# Patient Record
Sex: MALE | Race: WHITE | Employment: OTHER | URBAN - METROPOLITAN AREA
[De-identification: names, ages, dates, MRNs, and addresses within clinical notes are randomized per-mention and may not be internally consistent; named-entity substitution may affect disease eponyms.]

---

## 2017-02-21 ENCOUNTER — ONCOLOGY VISIT (OUTPATIENT)
Dept: ONCOLOGY | Facility: CLINIC | Age: 72
End: 2017-02-21
Attending: INTERNAL MEDICINE
Payer: COMMERCIAL

## 2017-02-21 VITALS
TEMPERATURE: 98.6 F | HEART RATE: 79 BPM | SYSTOLIC BLOOD PRESSURE: 128 MMHG | BODY MASS INDEX: 24.75 KG/M2 | DIASTOLIC BLOOD PRESSURE: 67 MMHG | WEIGHT: 167.6 LBS

## 2017-02-21 DIAGNOSIS — R63.5 ABNORMAL WEIGHT GAIN: ICD-10-CM

## 2017-02-21 DIAGNOSIS — C34.11 MALIGNANT NEOPLASM OF UPPER LOBE OF RIGHT LUNG (H): Primary | ICD-10-CM

## 2017-02-21 DIAGNOSIS — D61.818 PANCYTOPENIA (H): ICD-10-CM

## 2017-02-21 DIAGNOSIS — C34.11 MALIGNANT NEOPLASM OF UPPER LOBE OF RIGHT LUNG (H): ICD-10-CM

## 2017-02-21 DIAGNOSIS — C34.90 MALIGNANT NEOPLASM OF LUNG, UNSPECIFIED LATERALITY, UNSPECIFIED PART OF LUNG (H): ICD-10-CM

## 2017-02-21 LAB
ALBUMIN SERPL-MCNC: 3.6 G/DL (ref 3.4–5)
ALP SERPL-CCNC: 57 U/L (ref 40–150)
ALT SERPL W P-5'-P-CCNC: 15 U/L (ref 0–70)
ANION GAP SERPL CALCULATED.3IONS-SCNC: 8 MMOL/L (ref 3–14)
AST SERPL W P-5'-P-CCNC: 8 U/L (ref 0–45)
BASOPHILS # BLD AUTO: 0 10E9/L (ref 0–0.2)
BASOPHILS NFR BLD AUTO: 0.5 %
BILIRUB SERPL-MCNC: 0.5 MG/DL (ref 0.2–1.3)
BUN SERPL-MCNC: 22 MG/DL (ref 7–30)
CALCIUM SERPL-MCNC: 8.6 MG/DL (ref 8.5–10.1)
CHLORIDE SERPL-SCNC: 108 MMOL/L (ref 94–109)
CO2 SERPL-SCNC: 25 MMOL/L (ref 20–32)
CREAT SERPL-MCNC: 1.16 MG/DL (ref 0.66–1.25)
DIFFERENTIAL METHOD BLD: ABNORMAL
EOSINOPHIL # BLD AUTO: 0.1 10E9/L (ref 0–0.7)
EOSINOPHIL NFR BLD AUTO: 2.4 %
ERYTHROCYTE [DISTWIDTH] IN BLOOD BY AUTOMATED COUNT: 16.1 % (ref 10–15)
GFR SERPL CREATININE-BSD FRML MDRD: 62 ML/MIN/1.7M2
GLUCOSE SERPL-MCNC: 102 MG/DL (ref 70–99)
HCT VFR BLD AUTO: 32.9 % (ref 40–53)
HGB BLD-MCNC: 10.8 G/DL (ref 13.3–17.7)
IMM GRANULOCYTES # BLD: 0.1 10E9/L (ref 0–0.4)
IMM GRANULOCYTES NFR BLD: 1.9 %
LYMPHOCYTES # BLD AUTO: 1.5 10E9/L (ref 0.8–5.3)
LYMPHOCYTES NFR BLD AUTO: 35.5 %
MCH RBC QN AUTO: 34.4 PG (ref 26.5–33)
MCHC RBC AUTO-ENTMCNC: 32.8 G/DL (ref 31.5–36.5)
MCV RBC AUTO: 105 FL (ref 78–100)
MONOCYTES # BLD AUTO: 0.4 10E9/L (ref 0–1.3)
MONOCYTES NFR BLD AUTO: 8.7 %
NEUTROPHILS # BLD AUTO: 2.2 10E9/L (ref 1.6–8.3)
NEUTROPHILS NFR BLD AUTO: 51 %
NRBC # BLD AUTO: 0 10*3/UL
NRBC BLD AUTO-RTO: 0 /100
PLATELET # BLD AUTO: 125 10E9/L (ref 150–450)
POTASSIUM SERPL-SCNC: 4.4 MMOL/L (ref 3.4–5.3)
PROT SERPL-MCNC: 6.7 G/DL (ref 6.8–8.8)
RBC # BLD AUTO: 3.14 10E12/L (ref 4.4–5.9)
SODIUM SERPL-SCNC: 141 MMOL/L (ref 133–144)
TSH SERPL DL<=0.005 MIU/L-ACNC: 3.54 MU/L (ref 0.4–4)
WBC # BLD AUTO: 4.3 10E9/L (ref 4–11)

## 2017-02-21 PROCEDURE — 80053 COMPREHEN METABOLIC PANEL: CPT | Performed by: INTERNAL MEDICINE

## 2017-02-21 PROCEDURE — 85025 COMPLETE CBC W/AUTO DIFF WBC: CPT | Performed by: INTERNAL MEDICINE

## 2017-02-21 PROCEDURE — 99214 OFFICE O/P EST MOD 30 MIN: CPT | Mod: ZP | Performed by: INTERNAL MEDICINE

## 2017-02-21 PROCEDURE — 99212 OFFICE O/P EST SF 10 MIN: CPT | Mod: ZF

## 2017-02-21 PROCEDURE — 84443 ASSAY THYROID STIM HORMONE: CPT | Performed by: INTERNAL MEDICINE

## 2017-02-21 PROCEDURE — 36415 COLL VENOUS BLD VENIPUNCTURE: CPT | Performed by: INTERNAL MEDICINE

## 2017-02-21 RX ORDER — ALBUTEROL SULFATE 90 UG/1
AEROSOL, METERED RESPIRATORY (INHALATION)
Refills: 3 | COMMUNITY
Start: 2017-01-24

## 2017-02-21 RX ORDER — MOMETASONE FUROATE AND FORMOTEROL FUMARATE DIHYDRATE 100; 5 UG/1; UG/1
AEROSOL RESPIRATORY (INHALATION)
Refills: 3 | COMMUNITY
Start: 2017-01-24

## 2017-02-21 ASSESSMENT — PAIN SCALES - GENERAL: PAINLEVEL: MILD PAIN (2)

## 2017-02-21 NOTE — MR AVS SNAPSHOT
After Visit Summary   2/21/2017    Stew Crooks    MRN: [id number]           Patient Information     Date Of Birth          1945        Visit Information        Provider Department      2/21/2017 3:15 PM Henry Graham DO; MULTILINGUAL WORD [facility]        Today's Diagnoses     Malignant neoplasm of upper lobe of right lung (H)    -  1    Pancytopenia (H)        Abnormal weight gain           Follow-ups after your visit        Follow-up notes from your care team     Return in about 6 months (around 8/21/2017).      Your next 10 appointments already scheduled     Aug 22, 2017  3:30 PM CDT   [facility] Lab Draw with  [facility] LAB DRAW   [facility] Lab Draw ([facility])    [address] [place] [id number] [phone]            Aug 22, 2017  4:00 PM CDT   (Arrive by 3:45 PM)   Return Visit with Henry Graham DO   [facility] ([facility])    [address] [place] [id number] [phone]              Who to contact     If you have questions or need follow up information about today's clinic visit or your schedule please contact [facility] directly at [phone].  Normal or non-critical lab and imaging results will be communicated to you by MyChart, letter or phone within 4 business days after the clinic has received the results. If you do not hear from us within 7 days, please contact the clinic through MyChart or phone. If you have a critical or abnormal lab result, we will notify you by phone as soon as possible.  Submit refill requests through [employer] or call your pharmacy and they will forward the refill request to us. Please allow 3 business days for your refill to be completed.          Additional Information About Your Visit        [employer] Information     [employer] gives you secure access to your electronic health  record. If you see a primary care provider, you can also send messages to your care team and make appointments. If you have questions, please call your primary care clinic.  If you do not have a primary care provider, please call [phone] and they will assist you.        Care EveryWhere ID     This is your Care EveryWhere ID. This could be used by other organizations to access your [facility] medical records  [id number]        Your Vitals Were     Pulse Temperature BMI (Body Mass Index)             79 98.6  F (37  C) (Oral) 24.75 kg/m2          Blood Pressure from Last 3 Encounters:   02/21/17 128/67   08/23/16 120/70   05/31/16 138/74    Weight from Last 3 Encounters:   02/21/17 76 kg (167 lb 9.6 oz)   08/23/16 72.9 kg (160 lb 11.2 oz)   05/31/16 71.1 kg (156 lb 11.2 oz)               Primary Care Provider Office Phone # Fax #    Chavezcel Mckay Flores [phone]       [address] [facility] [address]  [facility] [address]        Thank you!     Thank you for choosing [facility]  for your care. Our goal is always to provide you with excellent care. Hearing back from our patients is one way we can continue to improve our services. Please take a few minutes to complete the written survey that you may receive in the mail after your visit with us. Thank you!             Your Updated Medication List - Protect others around you: Learn how to safely use, store and throw away your medicines at www.disposemymeds.org.          This list is accurate as of: 2/21/17 11:59 PM.  Always use your most recent med list.                   Brand Name Dispense Instructions for use    albuterol 108 (90 BASE) MCG/ACT Inhaler   Generic drug:  albuterol      INHALE 2 PUFFS BY MOUTH EVERY 4 HOURS AS NEEDED       DULERA 100-5 MCG/ACT oral inhaler   Generic drug:  mometasone-formoterol      INHALE 2 PUFFS BY MOUTH TWICE DAILY       TYLENOL PO      Take 500 mg by mouth

## 2017-02-21 NOTE — LETTER
2/21/2017       RE: Stew Crooks  [address]  [facility] [address]     Dear Colleague,    Thank you for referring your patient, Stew Crooks, to the [facility]. Please see a copy of my visit note below.    REASON FOR VISIT: f/u Lung ca    CANCER STAGE: Stage IIIB      HISTORY OF PRESENT ILLNESS:  71-year-old non-English speaking [place]ian Male  -In [facility] was diagnosed with herpes zoster in 10/2013. Ever since that point he has continued to have a burning sensation and pain in his R arm which never improved. In [facility] he underwent MRI for further evaluation of a thoracic origin and was diagnosed with a R pancoast tumor. He then underwent a CT CAP which found a 5-cm mass in the right apex invading the T2-T3 vertebral bodies and probably ribs 2 and 3. This mass is abutting subclavian artery with no david invasion. He was told not much can be done for him so the patient and his wife traveled to [place] for further care.   - 3/31/2014 EUS, EBUS with FNA revealed adeno-squamous carcinoma. The 4R node was positive but not 4L or 7.   - EGFR mutations were negative  -MRI brain was negative  - 04/07/14 PET showed in addition to main mass, a hypermetabolic precarinal nodes which likely describes stations 4L and 4R, both enlarged with hypermetabolism with the addition of a nodular thickening along the anterior pleura measuring 1.3 x 0.8 cm with a max SUV of 7.1. There is no evidence of disease outside the chest.   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  - 6/2014 completed chemoradiation with cisplatin/Etoposide (6000 cGy in 30 fractions).  - 6/24/14 RIJ DVT associated with his port.   - Restaging scans showed a good response to treatment    - In Dec 2015 was noted to have new pancytopenia. Lab workup negative (hep B, C, HIV, CMV, TSH, Iron studies, ferritin, folic acid, Vitamin B12, retic, and blood smear). He had a bone marrow biopsy in Jan  2016. 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 Given 5 days of IVIG 2/8 to 2/12.        Interim history: Mr. Crooks is here with his daughter and professional [place]  for six month f/u. He denies any changes in his health over that period. No f/c, cough, or cp/sob.     His daughter notes a 10-lb weight gain and is concerned that he is eating too much sugar. He is not very active though reports he can walk over a mile without difficulty.     This morning he noted some lower back pain. A few years ago he maybe had some pain in that area after bending over to lift a heavy box but this pain is new. No LE symptoms or urinary/bowel incontinence.     He passed the citizenship test and will take the oath of citizenship in the next 1-2 months. Has not been back to [facility] recently, his wife is there so he looks forward to visiting her soon. He is waiting until he becomes a citizen.     Review Of Systems  10-point review of systems were negative except as noted in HPI.    EXAM:  Blood pressure 128/67, pulse 79, temperature 98.6  F (37  C), temperature source Oral, weight 76 kg (167 lb 9.6 oz).  GEN: alert and oriented, nad  HEENT: perrla, sclera anicteric, oral mucosa moist without thrush  NECK: supple, no palpable LAD  HT: reg rate and rhythm, no murmurs  LUNGS: clear to auscultation bilaterally  ABD: soft, nt, nd, +bs   EXT: no clubbing, cyanosis, or edema  NEURO: CN 2-12 grossly intact, MS 5/5 b/l  SKIN: no rashes or lesions    Labs:  CBC RESULTS:   Recent Labs   Lab Test  02/21/17   1725   WBC  4.3   RBC  3.14*   HGB  10.8*   HCT  32.9*   MCV  105*   MCH  34.4*   MCHC  32.8   RDW  16.1*   PLT  125*     Cr 1.4    CT CAP:  2/21/17  IMPRESSION:   In this patient with history of lung cancer, status post  chemoradiation therapy:  1. No significant change in size of right apical lung mass,  associated  erosive changes of the adjacent vertebral bodies, compared to  8/19/2016, although this appears significantly smaller than 2014 and  2015, representing treatment response.   2. Fibrotic changes in the upper right lung, suggestive of  radiation-induced changes. Stable circumferential soft tissue  thickening surrounding low trachea.  3. 5 mm subpleural right apical nodule, unchanged since at least 2014.  4. Patchy hyperdensities involving T5 and T7, slightly less dense  compared to prior exam. Attention on follow-up. No new osseous lesion  to suggest metastasis.     Assessment/Plan  Stage III-B (T4N3M0) pancoast adeno-squamous carcinoma of the R lung.  Scan today shows stable right apical primary lesion and a small RLL nodule (5mm), also stable. He has no signs or symptoms of progressive disease. He is now over 2.5 years out from treatment with chemorads. He has stable residual disease and no indication for treatment. Continue with q6 month clinic visits, move to yearly surveillance imaging.   He is not sure if [place] will be in [place] in [place] (may be in [facility]), but if he is here encouraged him to follow-up with us. His daughter asked for today's scan on a disc and they will go down to radiology to get this, in case he sees a physician in [facility].     Pancytopenia - counts today are improved compared to six months ago. The BM bx in Jan 2016 was suspicious for MDS, especially given his h/o chemoradiation two years earlier. He also had a positive parvovirus PCR and was given IVIG in Feb 2016. He remains mildly macrocytic with previously normal RBC folate, B12, and copper levels. Repeat CBC in six months.       RTC in 6 months or when he is back from [facility] to see Dr. Graham with labs      Seen and discussed with staff Dr. Santos Shaikh MD  Heme/Onc Fellow  Pager: [phone]    I saw and examined the patient with Dr. Shaikh and agree with his note.  Stew is doing well overall.  He has no evidence of  recurrent lung cancer. We discussed several issues that are not likely related to lung cancer and though we did discuss checking a TSH for weight gain, I did refer him to primary care for the other complaints.  We will see him back in approx. 6 months.     Henry Graham   of Medicine  Division of Hematology, Oncology, and Transplantation

## 2017-02-21 NOTE — PROGRESS NOTES
REASON FOR VISIT: f/u Lung ca    CANCER STAGE: Stage IIIB      HISTORY OF PRESENT ILLNESS:  71-year-old non-English speaking Thai Male  -In [facility] was diagnosed with herpes zoster in 10/2013. Ever since that point he has continued to have a burning sensation and pain in his R arm which never improved. In [facility] he underwent MRI for further evaluation of a thoracic origin and was diagnosed with a R pancoast tumor. He then underwent a CT CAP which found a 5-cm mass in the right apex invading the T2-T3 vertebral bodies and probably ribs 2 and 3. This mass is abutting subclavian artery with no david invasion. He was told not much can be done for him so the patient and his wife traveled to [place] for further care.   - 3/31/2014 EUS, EBUS with FNA revealed adeno-squamous carcinoma. The 4R node was positive but not 4L or 7.   - EGFR mutations were negative  -MRI brain was negative  - 04/07/14 PET showed in addition to main mass, a hypermetabolic precarinal nodes which likely describes stations 4L and 4R, both enlarged with hypermetabolism with the addition of a nodular thickening along the anterior pleura measuring 1.3 x 0.8 cm with a max SUV of 7.1. There is no evidence of disease outside the chest.   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  - 6/2014 completed chemoradiation with cisplatin/Etoposide (6000 cGy in 30 fractions).  - 6/24/14 RIJ DVT associated with his port.   - Restaging scans showed a good response to treatment    - In Dec 2015 was noted to have new pancytopenia. Lab workup negative (hep B, C, HIV, CMV, TSH, Iron studies, ferritin, folic acid, Vitamin B12, retic, and blood smear). He had a bone marrow biopsy in Jan 2016. 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 Given 5 days of IVIG 2/8 to 2/12.        Interim history: Mr. Crooks is here  with his daughter and professional [facility]  for six month f/u. He denies any changes in his health over that period. No f/c, cough, or cp/sob.     His daughter notes a 10-lb weight gain and is concerned that he is eating too much sugar. He is not very active though reports he can walk over a mile without difficulty.     This morning he noted some lower back pain. A few years ago he maybe had some pain in that area after bending over to lift a heavy box but this pain is new. No LE symptoms or urinary/bowel incontinence.     He passed the citizenship test and will take the oath of citizenship in the next 1-2 months. Has not been back to [facility] recently, his wife is there so he looks forward to visiting her soon. He is waiting until he becomes a citizen.     Review Of Systems  10-point review of systems were negative except as noted in HPI.    EXAM:  Blood pressure 128/67, pulse 79, temperature 98.6  F (37  C), temperature source Oral, weight 76 kg (167 lb 9.6 oz).  GEN: alert and oriented, nad  HEENT: perrla, sclera anicteric, oral mucosa moist without thrush  NECK: supple, no palpable LAD  HT: reg rate and rhythm, no murmurs  LUNGS: clear to auscultation bilaterally  ABD: soft, nt, nd, +bs   EXT: no clubbing, cyanosis, or edema  NEURO: CN 2-12 grossly intact, MS 5/5 b/l  SKIN: no rashes or lesions    Labs:  CBC RESULTS:   Recent Labs   Lab Test  02/21/17   1725   WBC  4.3   RBC  3.14*   HGB  10.8*   HCT  32.9*   MCV  105*   MCH  34.4*   MCHC  32.8   RDW  16.1*   PLT  125*     Cr 1.4    CT CAP:  2/21/17  IMPRESSION:   In this patient with history of lung cancer, status post  chemoradiation therapy:  1. No significant change in size of right apical lung mass, associated  erosive changes of the adjacent vertebral bodies, compared to  8/19/2016, although this appears significantly smaller than 2014 and  2015, representing treatment response.   2. Fibrotic changes in the upper right lung, suggestive  of  radiation-induced changes. Stable circumferential soft tissue  thickening surrounding low trachea.  3. 5 mm subpleural right apical nodule, unchanged since at least 2014.  4. Patchy hyperdensities involving T5 and T7, slightly less dense  compared to prior exam. Attention on follow-up. No new osseous lesion  to suggest metastasis.     Assessment/Plan  Stage III-B (T4N3M0) pancoast adeno-squamous carcinoma of the R lung.  Scan today shows stable right apical primary lesion and a small RLL nodule (5mm), also stable. He has no signs or symptoms of progressive disease. He is now over 2.5 years out from treatment with chemorads. He has stable residual disease and no indication for treatment. Continue with q6 month clinic visits, move to yearly surveillance imaging.   He is not sure if [place] will be in [place] in [place] (may be in [facility]), but if he is here encouraged him to follow-up with us. His daughter asked for today's scan on a disc and they will go down to radiology to get this, in case he sees a physician in [facility].     Pancytopenia - counts today are improved compared to six months ago. The BM bx in Jan 2016 was suspicious for MDS, especially given his h/o chemoradiation two years earlier. He also had a positive parvovirus PCR and was given IVIG in Feb 2016. He remains mildly macrocytic with previously normal RBC folate, B12, and copper levels. Repeat CBC in six months.       RTC in 6 months or when he is back from [facility] to see Dr. Graham with labs      Seen and discussed with staff Dr. Santos Shaikh MD  Heme/Onc Fellow  Pager: [phone]    I saw and examined the patient with Dr. Shaikh and agree with his note.  Stew is doing well overall.  He has no evidence of recurrent lung cancer. We discussed several issues that are not likely related to lung cancer and though we did discuss checking a TSH for weight gain, I did refer him to primary care for the other complaints.  We will see him back in approx.  6 months.     Henry Graham   of Medicine  Division of Hematology, Oncology, and Transplantation

## 2017-02-21 NOTE — NURSING NOTE
"Stew Crooks is a 71 year old male who presents for:  Chief Complaint   Patient presents with     Oncology Clinic Visit     Lung CA        Initial Vitals:  /67 (BP Location: Right arm, Patient Position: Chair, Cuff Size: Adult Regular)  Pulse 79  Temp 98.6  F (37  C) (Oral)  Wt 76 kg (167 lb 9.6 oz)  BMI 24.75 kg/m2 Estimated body mass index is 24.75 kg/(m^2) as calculated from the following:    Height as of 8/23/16: 1.753 m (5' 9\").    Weight as of this encounter: 76 kg (167 lb 9.6 oz).. Body surface area is 1.92 meters squared. BP completed using cuff size: regular  Mild Pain (2) No LMP for male patient. Allergies and medications reviewed.     Medications: Medication refills not needed today.  Pharmacy name entered into [employer]:    [facility] [address] [place], [place] - [address] AT [facility]  [id number] [address]    Comments:     6 minutes for nursing intake (face to face time)   Saige Eugene CMA        "

## 2018-01-16 ENCOUNTER — RADIANT APPOINTMENT (OUTPATIENT)
Dept: CT IMAGING | Facility: CLINIC | Age: 73
End: 2018-01-16
Attending: INTERNAL MEDICINE
Payer: COMMERCIAL

## 2018-01-16 ENCOUNTER — ONCOLOGY VISIT (OUTPATIENT)
Dept: ONCOLOGY | Facility: CLINIC | Age: 73
End: 2018-01-16
Attending: INTERNAL MEDICINE
Payer: COMMERCIAL

## 2018-01-16 VITALS
WEIGHT: 161.4 LBS | DIASTOLIC BLOOD PRESSURE: 87 MMHG | HEIGHT: 69 IN | BODY MASS INDEX: 23.91 KG/M2 | SYSTOLIC BLOOD PRESSURE: 150 MMHG | RESPIRATION RATE: 16 BRPM | OXYGEN SATURATION: 93 % | HEART RATE: 85 BPM | TEMPERATURE: 98.6 F

## 2018-01-16 DIAGNOSIS — C34.11 MALIGNANT NEOPLASM OF UPPER LOBE OF RIGHT LUNG (H): ICD-10-CM

## 2018-01-16 DIAGNOSIS — C34.90 MALIGNANT NEOPLASM OF LUNG, UNSPECIFIED LATERALITY, UNSPECIFIED PART OF LUNG (H): ICD-10-CM

## 2018-01-16 DIAGNOSIS — C34.11 MALIGNANT NEOPLASM OF UPPER LOBE OF RIGHT LUNG (H): Primary | ICD-10-CM

## 2018-01-16 LAB
ALBUMIN SERPL-MCNC: 3.7 G/DL (ref 3.4–5)
ALP SERPL-CCNC: 58 U/L (ref 40–150)
ALT SERPL W P-5'-P-CCNC: 15 U/L (ref 0–70)
ANION GAP SERPL CALCULATED.3IONS-SCNC: 7 MMOL/L (ref 3–14)
AST SERPL W P-5'-P-CCNC: 12 U/L (ref 0–45)
BASOPHILS # BLD AUTO: 0.1 10E9/L (ref 0–0.2)
BASOPHILS NFR BLD AUTO: 1.7 %
BILIRUB SERPL-MCNC: 0.7 MG/DL (ref 0.2–1.3)
BUN SERPL-MCNC: 30 MG/DL (ref 7–30)
CALCIUM SERPL-MCNC: 8.5 MG/DL (ref 8.5–10.1)
CHLORIDE SERPL-SCNC: 107 MMOL/L (ref 94–109)
CO2 SERPL-SCNC: 25 MMOL/L (ref 20–32)
CREAT SERPL-MCNC: 1.23 MG/DL (ref 0.66–1.25)
DIFFERENTIAL METHOD BLD: ABNORMAL
EOSINOPHIL # BLD AUTO: 0.2 10E9/L (ref 0–0.7)
EOSINOPHIL NFR BLD AUTO: 5.7 %
ERYTHROCYTE [DISTWIDTH] IN BLOOD BY AUTOMATED COUNT: 17.2 % (ref 10–15)
GFR SERPL CREATININE-BSD FRML MDRD: 58 ML/MIN/1.7M2
GLUCOSE SERPL-MCNC: 101 MG/DL (ref 70–99)
HCT VFR BLD AUTO: 32.8 % (ref 40–53)
HGB BLD-MCNC: 10.6 G/DL (ref 13.3–17.7)
IMM GRANULOCYTES # BLD: 0 10E9/L (ref 0–0.4)
IMM GRANULOCYTES NFR BLD: 0 %
LYMPHOCYTES # BLD AUTO: 1.3 10E9/L (ref 0.8–5.3)
LYMPHOCYTES NFR BLD AUTO: 30 %
MCH RBC QN AUTO: 35.5 PG (ref 26.5–33)
MCHC RBC AUTO-ENTMCNC: 32.3 G/DL (ref 31.5–36.5)
MCV RBC AUTO: 110 FL (ref 78–100)
MONOCYTES # BLD AUTO: 0.3 10E9/L (ref 0–1.3)
MONOCYTES NFR BLD AUTO: 6.1 %
NEUTROPHILS # BLD AUTO: 2.4 10E9/L (ref 1.6–8.3)
NEUTROPHILS NFR BLD AUTO: 56.5 %
NRBC # BLD AUTO: 0 10*3/UL
NRBC BLD AUTO-RTO: 0 /100
PLATELET # BLD AUTO: 141 10E9/L (ref 150–450)
POTASSIUM SERPL-SCNC: 4.1 MMOL/L (ref 3.4–5.3)
PROT SERPL-MCNC: 6.8 G/DL (ref 6.8–8.8)
RBC # BLD AUTO: 2.99 10E12/L (ref 4.4–5.9)
SODIUM SERPL-SCNC: 139 MMOL/L (ref 133–144)
WBC # BLD AUTO: 4.2 10E9/L (ref 4–11)

## 2018-01-16 PROCEDURE — 99214 OFFICE O/P EST MOD 30 MIN: CPT | Mod: ZP | Performed by: INTERNAL MEDICINE

## 2018-01-16 PROCEDURE — 80053 COMPREHEN METABOLIC PANEL: CPT | Performed by: INTERNAL MEDICINE

## 2018-01-16 PROCEDURE — G0463 HOSPITAL OUTPT CLINIC VISIT: HCPCS | Mod: ZF

## 2018-01-16 PROCEDURE — 85025 COMPLETE CBC W/AUTO DIFF WBC: CPT | Performed by: INTERNAL MEDICINE

## 2018-01-16 ASSESSMENT — PAIN SCALES - GENERAL: PAINLEVEL: NO PAIN (0)

## 2018-01-16 NOTE — LETTER
1/16/2018       RE: Stew Crooks  [address]  [facility] [address]     Dear Colleague,    Thank you for referring your patient, Stew Crooks, to the [facility]. Please see a copy of my visit note below.    REASON FOR VISIT:    CANCER STAGE: No matching staging information was found for the patient.      HISTORY OF PRESENT ILLNESS:  71-year-old non-English speaking Hungarian Male  -In [facility] was diagnosed with herpes zoster in 10/2013. Ever since that point he has continued to have a burning sensation and pain in his R arm which never improved. In [facility] he underwent MRI for further evaluation of a thoracic origin and was diagnosed with a R pancoast tumor. He then underwent a CT CAP which found a 5-cm mass in the right apex invading the T2-T3 vertebral bodies and probably ribs 2 and 3. This mass is abutting subclavian artery with no david invasion. He was told not much can be done for him so the patient and his wife traveled to [place] for further care.   - 3/31/2014 EUS, EBUS with FNA revealed adeno-squamous carcinoma. The 4R node was positive but not 4L or 7.   - EGFR mutations were negative  -MRI brain was negative  - 04/07/14 PET showed in addition to main mass, a hypermetabolic precarinal nodes which likely describes stations 4L and 4R, both enlarged with hypermetabolism with the addition of a nodular thickening along the anterior pleura measuring 1.3 x 0.8 cm with a max SUV of 7.1. There is no evidence of disease outside the chest.   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  - 6/2014 completed chemoradiation with cisplatin/Etoposide (6000 cGy in 30 fractions).  - 6/24/14 RIJ DVT associated with his port.   - Restaging scans showed a good response to treatment     - In Dec 2015 was noted to have new pancytopenia. Lab workup negative (hep B, C, HIV, CMV, TSH, Iron studies, ferritin, folic acid, Vitamin B12, retic, and blood smear).  "He had a bone marrow biopsy in Jan 2016. 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 Given 5 days of IVIG 2/8 to 2/12.      Stew returns in follow up after one year.  He is now 3.5 years out from completion of chemorads.  He is doing well. He was in [facility] last summer and had a nice trip there.  He is really without significant complaints right now.  He is not having any shortness of breath. He eats well and is not losing weight.  His back pain is pretty stable and not really an issue right now.  His daughter brought up that he has had some issues with his prostate but refuses to go get it rechecked.  He did take some \"Prostamol Ed\" in [facility] for his prostate, but it did not help.  He gets up a couple times a night to go to the bathroom and is not particularly bothered by this.  He otherwise has no other complaints.     Review Of Systems  10-point review of systems were negative except as noted in HPI.        EXAM:  B/P: 150/87, T: 98.6, P: 85, R: 16  GEN: alert and oriented x 3, nad  HEENT: perrla, eomi, sclera anicteric, oral mucosa moist without thrush  NECK: supple, no palpable LAD  HT: reg rate and rhythm, no murmurs  LUNGS: clear to auscultation bilaterally  ABD: soft, nt, nd, +bs x 4  EXT: no clubbing, cyanosis, or edema  NEURO: CN 2-12 intact, MS 5/5 b/l    Current Outpatient Prescriptions   Medication Sig Dispense Refill     DULERA 100-5 MCG/ACT oral inhaler INHALE 2 PUFFS BY MOUTH TWICE DAILY  3     ALBUTEROL 108 (90 BASE) MCG/ACT inhaler INHALE 2 PUFFS BY MOUTH EVERY 4 HOURS AS NEEDED  3     Acetaminophen (TYLENOL PO) Take 500 mg by mouth             Recent Labs   Lab Test  02/21/17   1725   WBC  4.3   HGB  10.8*   PLT  125*     @labrcent[na,potassium,chloride,co2,bun,cr@  Recent Labs   Lab Test  02/21/17   1725   PROTTOTAL  6.7*   ALBUMIN  3.6   BILITOTAL  0.5   AST  8   ALT  15   ALKPHOS  " 57         Results for orders placed or performed in visit on 02/21/17   CT Chest w Contrast    Narrative    EXAMINATION: CT CHEST W CONTRAST, 2/21/2017 1:28 PM    TECHNIQUE:  Helical CT images from the lung apices through the upper  abdomen were obtained with IV contrast.  Contrast: 79 mL Isovue-370    COMPARISON: CT 8/19/2016, CT 5/22/2015    HISTORY: Malignant neoplasm of upper lobe, right bronchus or lung,  history of adenosquamous lung cancer, Pancoast tumor, status post  chemoradiation therapy completed in 2014.    FINDINGS:    Chest: Partially visualized thyroid is unremarkable. Postsurgical  changes or mediastinal lymphadenectomy. Stable circumferential soft  tissue thickening and obscuration of fat plane of the distal trachea.  7 mm aortopulmonary prominent lymph node, unchanged. No  lymphadenopathy in the chest by size criteria. Normal heart size. Tiny  anterior pericardial effusion, decreased compared to prior exam.  Normal caliber of the thoracic aorta and pulmonary vasculature.    No pleural effusion or pneumothorax. No central pulmonary emboli.    Lungs: Trachea and central airways are patent. Mild centrilobular  emphysematous changes, unchanged. Mosaic attenuation of the right  middle lobe with large central bulla formation on series 4, image 157,  unchanged.    No significant change in right apical lung mass measuring 2.6 x 1.8 cm  in axial dimension, with no new adjacent nodule or soft tissue  thickening. Interstitial fibrotic changes in the upper right lung is  unchanged, representing radiation-induced fibrotic changes.    Unchanged 5 mm subpleural right apical nodule, on series 4, image 57,  unchanged since at least 6/24/2014. No new pulmonary nodule. No focal  pulmonary consolidation.    Upper abdomen: Partially visualized kidneys demonstrate homogeneous  enhancement. No hydronephrosis. Bladder is contracted. Tiny  hypodensity in the left lobe of the liver, subcapsular region, too  small to  characterize, unchanged. Limited evaluation of the upper  abdomen is otherwise unremarkable.    Bones and soft tissues: No significant change in erosive changes of T1  and T2 vertebral bodies adjacent to the right apical lung mass. There  is slightly decreased attenuation of sclerotic lesions involving the  T5 and T7 vertebral body compared to prior exam, although not  visualized on prior exam 5/22/2015. Minimal anterior compression  fracture deformity of L1, unchanged. No new suspicious osseous lesion.  No abnormal soft tissue density.      Impression    IMPRESSION:   In this patient with history of lung cancer, status post  chemoradiation therapy:  1. No significant change in size of right apical lung mass, associated  erosive changes of the adjacent vertebral bodies, compared to  8/19/2016, although this appears significantly smaller than 2014 and  2015, representing treatment response.   2. Fibrotic changes in the upper right lung, suggestive of  radiation-induced changes. Stable circumferential soft tissue  thickening surrounding low trachea.  3. 5 mm subpleural right apical nodule, unchanged since at least 2014.  4. Patchy hyperdensities involving T5 and T7, slightly less dense  compared to prior exam. Attention on follow-up. No new osseous lesion  to suggest metastasis.    I have personally reviewed the examination and initial interpretation  and I agree with the findings.    JONN OJEDA MD           Assessment/Plan  Stage IIIa NSCLC - He is now several years out from completion of his definitive therapy.  His scan today looks good and there are no concerning lesions.  He plans to go back to [facility] this summer so we will see him back in one year with repeat CT chest.      HTN - his BP is a little on the high side today.  I suggested that he get his BP checked again while at home or in the pharmacy periodically.  I counseled them that I would not treat based on one high BP and he should follow up with his  PCP.     Cr of 1.2 - We discussed that this is a little higher than we would expect.  I asked him to push some fluids and get it rechecked in a little while.  If it is still elevated, he may need some additional w/u, but if it normalizes, he just needs to focus on getting good fluid intake.     I spent 30 minutes with the patient.  >50% of the time was spent in counseling and coordination of care.    Henry Graham   of Medicine  Division of Hematology, Oncology, and Transplantation

## 2018-01-16 NOTE — MR AVS SNAPSHOT
After Visit Summary   1/16/2018    Stew Crooks    MRN: [id number]           Patient Information     Date Of Birth          1945        Visit Information        Provider Department      1/16/2018 5:45 PM Henry Graham DO; MULTILINGUAL WORD [facility]        Today's Diagnoses     Malignant neoplasm of upper lobe of right lung (H)    -  1       Follow-ups after your visit        Your next 10 appointments already scheduled     Jan 11, 2019 10:45 AM CST   LAB with  LAB   [facility] ([facility])    [address] [place] [id number] [phone]           Please do not eat 10-12 hours before your appointment if you are coming in fasting for labs on lipids, cholesterol, or glucose (sugar). This does not apply to pregnant women. Water, hot tea and black coffee (with nothing added) are okay. Do not drink other fluids, diet soda or chew gum.            Jan 11, 2019 11:20 AM CST   (Arrive by 11:05 AM)   CT CHEST W/O CONTRAST with UCCT1   [facility] CT ([facility])    [address] [place] [id number] [phone]           Please bring any scans or X-rays taken at other hospitals, if similar tests were done. Also bring a list of your medicines, including vitamins, minerals and over-the-counter drugs. It is safest to leave personal items at home.  Be sure to tell your doctor:   If you have any allergies.   If there s any chance you are pregnant.   If you are breastfeeding.   If you have any special needs.  You do not need to do anything special to prepare.  Please wear loose clothing, such as a sweat suit or jogging clothes. Avoid snaps, zippers and other metal. We may ask you to undress and put on a hospital gown.            Mamadou 15, 2019  3:00 PM CST   (Arrive by 2:45 PM)   Return Visit with Henry Graham DO   [facility] ([facility]  "Clinics and Surgery Center)    [address]  [address]  [facility] [address] [phone]              Future tests that were ordered for you today     Open Future Orders        Priority Expected Expires Ordered    CT Chest w/o Contrast Routine 1/16/2019 1/16/2019 1/16/2018    Comprehensive metabolic panel Routine 1/16/2019 1/16/2019 1/16/2018    CBC with platelets differential Routine 1/16/2019 1/16/2019 1/16/2018            Who to contact     If you have questions or need follow up information about today's clinic visit or your schedule please contact [facility] directly at [phone].  Normal or non-critical lab and imaging results will be communicated to you by NetVisionhart, letter or phone within 4 business days after the clinic has received the results. If you do not hear from us within 7 days, please contact the clinic through [employer] or phone. If you have a critical or abnormal lab result, we will notify you by phone as soon as possible.  Submit refill requests through [employer] or call your pharmacy and they will forward the refill request to us. Please allow 3 business days for your refill to be completed.          Additional Information About Your Visit        Net[employer]ision[employer]Class[employer] Information     [employer] gives you secure access to your electronic health record. If you see a primary care provider, you can also send messages to your care team and make appointments. If you have questions, please call your primary care clinic.  If you do not have a primary care provider, please call [phone] and they will assist you.        Care EveryWhere ID     This is your Care EveryWhere ID. This could be used by other organizations to access your [facility] medical records  [id number]        Your Vitals Were     Pulse Temperature Respirations Height Pulse Oximetry BMI (Body Mass Index)    85 98.6  F (37  C) (Oral) 16 1.753 m (5' 9\") 93% 23.83 kg/m2       Blood Pressure from Last 3 Encounters: "   01/16/18 150/87   02/21/17 128/67   08/23/16 120/70    Weight from Last 3 Encounters:   01/16/18 73.2 kg (161 lb 6.4 oz)   02/21/17 76 kg (167 lb 9.6 oz)   08/23/16 72.9 kg (160 lb 11.2 oz)               Primary Care Provider Office Phone # Fax #    Rani Mckay Flores [phone]       [facility] [address]  [facility] [address]        Equal Access to Services     [facility]: Hadii aad ku hadasho Soomaali, waaxda luqadaha, qaybta kaalmada adeegyada, florida garcia . So [facility] [phone].    ATENCIÓN: Si habla español, tiene a huynh disposición servicios gratuitos de asistencia lingüística. [facility] [phone].    We comply with applicable federal civil rights laws and [place] laws. We do not discriminate on the basis of race, color, national origin, age, disability, sex, sexual orientation, or gender identity.            Thank you!     Thank you for choosing [facility]  for your care. Our goal is always to provide you with excellent care. Hearing back from our patients is one way we can continue to improve our services. Please take a few minutes to complete the written survey that you may receive in the mail after your visit with us. Thank you!             Your Updated Medication List - Protect others around you: Learn how to safely use, store and throw away your medicines at www.disposemymeds.org.          This list is accurate as of: 1/16/18 11:59 PM.  Always use your most recent med list.                   Brand Name Dispense Instructions for use Diagnosis    DULERA 100-5 MCG/ACT oral inhaler   Generic drug:  mometasone-formoterol      INHALE 2 PUFFS BY MOUTH TWICE DAILY        PROAIR  (90 BASE) MCG/ACT Inhaler   Generic drug:  albuterol      INHALE 2 PUFFS BY MOUTH EVERY 4 HOURS AS NEEDED        TYLENOL PO      Take 500 mg by mouth

## 2018-01-16 NOTE — NURSING NOTE
"Oncology Rooming Note    January 16, 2018 5:40 PM   Stew Crooks is a 72 year old male who presents for:    Chief Complaint   Patient presents with     Oncology Clinic Visit     LUNG CA     Initial Vitals: There were no vitals taken for this visit. Estimated body mass index is 24.75 kg/(m^2) as calculated from the following:    Height as of 8/23/16: 1.753 m (5' 9\").    Weight as of 2/21/17: 76 kg (167 lb 9.6 oz). There is no height or weight on file to calculate BSA.  Data Unavailable Comment: Data Unavailable   No LMP for male patient.  Allergies reviewed: Yes  Medications reviewed: Yes    Medications: Medication refills not needed today.  Pharmacy name entered into [employer]:    [facility] [id number] [facility] [address] AT [id number] [address] AT [address]    Preventive Care:    Colorectal Cancer Screening: During our visit today, we discussed that it is recommended you receive colorectal cancer screening. Please call or make an appointment with your primary care provider to discuss this. You may also call the  [employer] scheduling line ([phone]) to set up a colonoscopy appointment.    5 minutes for nursing intake (face to face time)     MAX GAN CMA              "

## 2018-01-16 NOTE — PROGRESS NOTES
REASON FOR VISIT:    CANCER STAGE: No matching staging information was found for the patient.      HISTORY OF PRESENT ILLNESS:  71-year-old non-English speaking Luxembourgish Male  -In [facility] was diagnosed with herpes zoster in 10/2013. Ever since that point he has continued to have a burning sensation and pain in his R arm which never improved. In [facility] he underwent MRI for further evaluation of a thoracic origin and was diagnosed with a R pancoast tumor. He then underwent a CT CAP which found a 5-cm mass in the right apex invading the T2-T3 vertebral bodies and probably ribs 2 and 3. This mass is abutting subclavian artery with no david invasion. He was told not much can be done for him so the patient and his wife traveled to [place] for further care.   - 3/31/2014 EUS, EBUS with FNA revealed adeno-squamous carcinoma. The 4R node was positive but not 4L or 7.   - EGFR mutations were negative  -MRI brain was negative  - 04/07/14 PET showed in addition to main mass, a hypermetabolic precarinal nodes which likely describes stations 4L and 4R, both enlarged with hypermetabolism with the addition of a nodular thickening along the anterior pleura measuring 1.3 x 0.8 cm with a max SUV of 7.1. There is no evidence of disease outside the chest.   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  - 6/2014 completed chemoradiation with cisplatin/Etoposide (6000 cGy in 30 fractions).  - 6/24/14 RIJ DVT associated with his port.   - Restaging scans showed a good response to treatment     - In Dec 2015 was noted to have new pancytopenia. Lab workup negative (hep B, C, HIV, CMV, TSH, Iron studies, ferritin, folic acid, Vitamin B12, retic, and blood smear). He had a bone marrow biopsy in Jan 2016. 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 Given 5 days of IVIG 2/8 to 2/12.   "    Stew returns in follow up after one year.  He is now 3.5 years out from completion of chemorads.  He is doing well. He was in [place] last summer and had a nice trip there.  He is really without significant complaints right now.  He is not having any shortness of breath. He eats well and is not losing weight.  His back pain is pretty stable and not really an issue right now.  His daughter brought up that he has had some issues with his prostate but refuses to go get it rechecked.  He did take some \"Prostamol Ed\" in [facility] for his prostate, but it did not help.  He gets up a couple times a night to go to the bathroom and is not particularly bothered by this.  He otherwise has no other complaints.     Review Of Systems  10-point review of systems were negative except as noted in HPI.        EXAM:  B/P: 150/87, T: 98.6, P: 85, R: 16  GEN: alert and oriented x 3, nad  HEENT: perrla, eomi, sclera anicteric, oral mucosa moist without thrush  NECK: supple, no palpable LAD  HT: reg rate and rhythm, no murmurs  LUNGS: clear to auscultation bilaterally  ABD: soft, nt, nd, +bs x 4  EXT: no clubbing, cyanosis, or edema  NEURO: CN 2-12 intact, MS 5/5 b/l    Current Outpatient Prescriptions   Medication Sig Dispense Refill     DULERA 100-5 MCG/ACT oral inhaler INHALE 2 PUFFS BY MOUTH TWICE DAILY  3     ALBUTEROL 108 (90 BASE) MCG/ACT inhaler INHALE 2 PUFFS BY MOUTH EVERY 4 HOURS AS NEEDED  3     Acetaminophen (TYLENOL PO) Take 500 mg by mouth             Recent Labs   Lab Test  02/21/17   1725   WBC  4.3   HGB  10.8*   PLT  125*     @labrcent[na,potassium,chloride,co2,bun,cr@  Recent Labs   Lab Test  02/21/17   1725   PROTTOTAL  6.7*   ALBUMIN  3.6   BILITOTAL  0.5   AST  8   ALT  15   ALKPHOS  57         Results for orders placed or performed in visit on 02/21/17   CT Chest w Contrast    Narrative    EXAMINATION: CT CHEST W CONTRAST, 2/21/2017 1:28 PM    TECHNIQUE:  Helical CT images from the lung apices through the " upper  abdomen were obtained with IV contrast.  Contrast: 79 mL Isovue-370    COMPARISON: CT 8/19/2016, CT 5/22/2015    HISTORY: Malignant neoplasm of upper lobe, right bronchus or lung,  history of adenosquamous lung cancer, Pancoast tumor, status post  chemoradiation therapy completed in 2014.    FINDINGS:    Chest: Partially visualized thyroid is unremarkable. Postsurgical  changes or mediastinal lymphadenectomy. Stable circumferential soft  tissue thickening and obscuration of fat plane of the distal trachea.  7 mm aortopulmonary prominent lymph node, unchanged. No  lymphadenopathy in the chest by size criteria. Normal heart size. Tiny  anterior pericardial effusion, decreased compared to prior exam.  Normal caliber of the thoracic aorta and pulmonary vasculature.    No pleural effusion or pneumothorax. No central pulmonary emboli.    Lungs: Trachea and central airways are patent. Mild centrilobular  emphysematous changes, unchanged. Mosaic attenuation of the right  middle lobe with large central bulla formation on series 4, image 157,  unchanged.    No significant change in right apical lung mass measuring 2.6 x 1.8 cm  in axial dimension, with no new adjacent nodule or soft tissue  thickening. Interstitial fibrotic changes in the upper right lung is  unchanged, representing radiation-induced fibrotic changes.    Unchanged 5 mm subpleural right apical nodule, on series 4, image 57,  unchanged since at least 6/24/2014. No new pulmonary nodule. No focal  pulmonary consolidation.    Upper abdomen: Partially visualized kidneys demonstrate homogeneous  enhancement. No hydronephrosis. Bladder is contracted. Tiny  hypodensity in the left lobe of the liver, subcapsular region, too  small to characterize, unchanged. Limited evaluation of the upper  abdomen is otherwise unremarkable.    Bones and soft tissues: No significant change in erosive changes of T1  and T2 vertebral bodies adjacent to the right apical lung mass.  There  is slightly decreased attenuation of sclerotic lesions involving the  T5 and T7 vertebral body compared to prior exam, although not  visualized on prior exam 5/22/2015. Minimal anterior compression  fracture deformity of L1, unchanged. No new suspicious osseous lesion.  No abnormal soft tissue density.      Impression    IMPRESSION:   In this patient with history of lung cancer, status post  chemoradiation therapy:  1. No significant change in size of right apical lung mass, associated  erosive changes of the adjacent vertebral bodies, compared to  8/19/2016, although this appears significantly smaller than 2014 and  2015, representing treatment response.   2. Fibrotic changes in the upper right lung, suggestive of  radiation-induced changes. Stable circumferential soft tissue  thickening surrounding low trachea.  3. 5 mm subpleural right apical nodule, unchanged since at least 2014.  4. Patchy hyperdensities involving T5 and T7, slightly less dense  compared to prior exam. Attention on follow-up. No new osseous lesion  to suggest metastasis.    I have personally reviewed the examination and initial interpretation  and I agree with the findings.    JONN OJEDA MD           Assessment/Plan  Stage IIIa NSCLC - He is now several years out from completion of his definitive therapy.  His scan today looks good and there are no concerning lesions.  He plans to go back to [facility] this summer so we will see him back in one year with repeat CT chest.      HTN - his BP is a little on the high side today.  I suggested that he get his BP checked again while at home or in the pharmacy periodically.  I counseled them that I would not treat based on one high BP and he should follow up with his PCP.     Cr of 1.2 - We discussed that this is a little higher than we would expect.  I asked him to push some fluids and get it rechecked in a little while.  If it is still elevated, he may need some additional w/u, but if it  normalizes, he just needs to focus on getting good fluid intake.     I spent 30 minutes with the patient.  >50% of the time was spent in counseling and coordination of care.    Henry rGaham   of Medicine  Division of Hematology, Oncology, and Transplantation

## 2019-01-11 ENCOUNTER — ANCILLARY PROCEDURE (OUTPATIENT)
Dept: CT IMAGING | Facility: CLINIC | Age: 74
End: 2019-01-11
Attending: INTERNAL MEDICINE
Payer: COMMERCIAL

## 2019-01-11 DIAGNOSIS — C34.11 MALIGNANT NEOPLASM OF UPPER LOBE OF RIGHT LUNG (H): ICD-10-CM

## 2019-01-11 LAB
ACANTHOCYTES BLD QL SMEAR: SLIGHT
ALBUMIN SERPL-MCNC: 3.8 G/DL (ref 3.4–5)
ALP SERPL-CCNC: 62 U/L (ref 40–150)
ALT SERPL W P-5'-P-CCNC: 15 U/L (ref 0–70)
ANION GAP SERPL CALCULATED.3IONS-SCNC: 9 MMOL/L (ref 3–14)
ANISOCYTOSIS BLD QL SMEAR: ABNORMAL
AST SERPL W P-5'-P-CCNC: 10 U/L (ref 0–45)
BASOPHILS # BLD AUTO: 0.1 10E9/L (ref 0–0.2)
BASOPHILS NFR BLD AUTO: 1.9 %
BILIRUB SERPL-MCNC: 1 MG/DL (ref 0.2–1.3)
BITE CELLS BLD QL SMEAR: SLIGHT
BUN SERPL-MCNC: 18 MG/DL (ref 7–30)
CALCIUM SERPL-MCNC: 8.5 MG/DL (ref 8.5–10.1)
CHLORIDE SERPL-SCNC: 106 MMOL/L (ref 94–109)
CO2 SERPL-SCNC: 25 MMOL/L (ref 20–32)
CREAT SERPL-MCNC: 1.06 MG/DL (ref 0.66–1.25)
DIFFERENTIAL METHOD BLD: ABNORMAL
EOSINOPHIL # BLD AUTO: 0.1 10E9/L (ref 0–0.7)
EOSINOPHIL NFR BLD AUTO: 1.9 %
ERYTHROCYTE [DISTWIDTH] IN BLOOD BY AUTOMATED COUNT: 18.2 % (ref 10–15)
GFR SERPL CREATININE-BSD FRML MDRD: 69 ML/MIN/{1.73_M2}
GLUCOSE SERPL-MCNC: 92 MG/DL (ref 70–99)
HCT VFR BLD AUTO: 33.3 % (ref 40–53)
HGB BLD-MCNC: 10.8 G/DL (ref 13.3–17.7)
LYMPHOCYTES # BLD AUTO: 0.8 10E9/L (ref 0.8–5.3)
LYMPHOCYTES NFR BLD AUTO: 18.7 %
MACROCYTES BLD QL SMEAR: PRESENT
MCH RBC QN AUTO: 34.6 PG (ref 26.5–33)
MCHC RBC AUTO-ENTMCNC: 32.4 G/DL (ref 31.5–36.5)
MCV RBC AUTO: 107 FL (ref 78–100)
MONOCYTES # BLD AUTO: 0 10E9/L (ref 0–1.3)
MONOCYTES NFR BLD AUTO: 0.9 %
NEUTROPHILS # BLD AUTO: 3.2 10E9/L (ref 1.6–8.3)
NEUTROPHILS NFR BLD AUTO: 76.6 %
OVALOCYTES BLD QL SMEAR: ABNORMAL
PLATELET # BLD AUTO: 192 10E9/L (ref 150–450)
PLATELET # BLD EST: ABNORMAL 10*3/UL
POIKILOCYTOSIS BLD QL SMEAR: ABNORMAL
POLYCHROMASIA BLD QL SMEAR: SLIGHT
POTASSIUM SERPL-SCNC: 4.3 MMOL/L (ref 3.4–5.3)
PROT SERPL-MCNC: 7.2 G/DL (ref 6.8–8.8)
RBC # BLD AUTO: 3.12 10E12/L (ref 4.4–5.9)
RBC INCLUSIONS BLD: SLIGHT
SODIUM SERPL-SCNC: 139 MMOL/L (ref 133–144)
WBC # BLD AUTO: 4.2 10E9/L (ref 4–11)

## 2019-01-15 ENCOUNTER — ONCOLOGY VISIT (OUTPATIENT)
Dept: ONCOLOGY | Facility: CLINIC | Age: 74
End: 2019-01-15
Attending: INTERNAL MEDICINE
Payer: COMMERCIAL

## 2019-01-15 VITALS
DIASTOLIC BLOOD PRESSURE: 73 MMHG | HEART RATE: 76 BPM | HEIGHT: 69 IN | SYSTOLIC BLOOD PRESSURE: 150 MMHG | BODY MASS INDEX: 23.36 KG/M2 | RESPIRATION RATE: 18 BRPM | OXYGEN SATURATION: 95 % | WEIGHT: 157.7 LBS | TEMPERATURE: 99 F

## 2019-01-15 DIAGNOSIS — C34.11 MALIGNANT NEOPLASM OF UPPER LOBE OF RIGHT LUNG (H): Primary | ICD-10-CM

## 2019-01-15 PROCEDURE — 99214 OFFICE O/P EST MOD 30 MIN: CPT | Mod: GC | Performed by: INTERNAL MEDICINE

## 2019-01-15 PROCEDURE — G0463 HOSPITAL OUTPT CLINIC VISIT: HCPCS | Mod: ZF

## 2019-01-15 RX ORDER — SELENIUM SULFIDE 2.5 MG/100ML
LOTION TOPICAL
Refills: 2 | COMMUNITY
Start: 2018-02-13 | End: 2020-01-01

## 2019-01-15 ASSESSMENT — PAIN SCALES - GENERAL: PAINLEVEL: NO PAIN (0)

## 2019-01-15 ASSESSMENT — MIFFLIN-ST. JEOR: SCORE: 1450.95

## 2019-01-15 NOTE — LETTER
1/15/2019       RE: Stew Crooks  [address]  [facility] [id number]     Dear Colleague,    Thank you for referring your patient, Stew Crooks, to the [facility]. Please see a copy of my visit note below.    REASON FOR VISIT:    CANCER STAGE: Cancer Staging  No matching staging information was found for the patient.      HISTORY OF PRESENT ILLNESS:  71-year-old non-English speaking Malay Male  -In [facility] was diagnosed with herpes zoster in 10/2013. Ever since that point he has continued to have a burning sensation and pain in his R arm which never improved. In [facility] he underwent MRI for further evaluation of a thoracic origin and was diagnosed with a R pancoast tumor. He then underwent a CT CAP which found a 5-cm mass in the right apex invading the T2-T3 vertebral bodies and probably ribs 2 and 3. This mass is abutting subclavian artery with no david invasion. He was told not much can be done for him so the patient and his wife traveled to [place] for further care.   - 3/31/2014 EUS, EBUS with FNA revealed adeno-squamous carcinoma. The 4R node was positive but not 4L or 7.   - EGFR mutations were negative  -MRI brain was negative  - 04/07/14 PET showed in addition to main mass, a hypermetabolic precarinal nodes which likely describes stations 4L and 4R, both enlarged with hypermetabolism with the addition of a nodular thickening along the anterior pleura measuring 1.3 x 0.8 cm with a max SUV of 7.1. There is no evidence of disease outside the chest.   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  - 6/2014 completed chemoradiation with cisplatin/Etoposide (6000 cGy in 30 fractions).  - 6/24/14 RIJ DVT associated with his port.   - Restaging scans showed a good response to treatment     - In Dec 2015 was noted to have new pancytopenia. Lab workup negative (hep B, C, HIV, CMV, TSH, Iron studies, ferritin, folic acid, Vitamin B12, retic,  "and blood smear). He had a bone marrow biopsy in Jan 2016. 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 Given 5 days of IVIG 2/8 to 2/12.      Stew returns in follow up after one year.  He is now 4.5 years out from completion of chemorads.  He is doing well. He was in [facility] over the summer and had a nice trip there.  He is really without significant complaints right now.  He is not having any shortness of breath. He eats well and is not losing weight.  His back pain is pretty stable and not really an issue right now. We also discussed his urinary symptoms which were brought up at his last visit. He has nocturia once nightly and some intermittency. Overall no significant change over the past 6 months. He otherwise has no other complaints.     Review Of Systems  10-point review of systems were negative except as noted in HPI.        EXAM:  /73 (BP Location: Left arm, Patient Position: Sitting, Cuff Size: Adult Regular)   Pulse 76   Temp 99  F (37.2  C) (Oral)   Resp 18   Ht 1.753 m (5' 9.02\")   Wt 71.5 kg (157 lb 11.2 oz)   SpO2 95%   BMI 23.28 kg/m     GEN: alert and oriented x 3, nad  HEENT: perrla, eomi, sclera anicteric, oral mucosa moist without thrush  NECK: supple, no palpable LAD  HT: reg rate and rhythm, no murmurs  LUNGS: clear to auscultation bilaterally  ABD: soft, nt, nd  EXT: no clubbing, cyanosis, or edema  NEURO: CN 2-12 intact, normal gait    Current Outpatient Medications   Medication Sig Dispense Refill     Acetaminophen (TYLENOL PO) Take 500 mg by mouth       ALBUTEROL 108 (90 BASE) MCG/ACT inhaler INHALE 2 PUFFS BY MOUTH EVERY 4 HOURS AS NEEDED  3     DULERA 100-5 MCG/ACT oral inhaler INHALE 2 PUFFS BY MOUTH TWICE DAILY  3     selenium sulfide (SELSUN) 2.5 % external lotion 1 APPLICATION TO AFFECTED AREA EXTERNALLY  2           Orders Only on 01/11/2019   Component Date " Value Ref Range Status     WBC 01/11/2019 4.2  4.0 - 11.0 10e9/L Final     RBC Count 01/11/2019 3.12* 4.4 - 5.9 10e12/L Final     Hemoglobin 01/11/2019 10.8* 13.3 - 17.7 g/dL Final     Hematocrit 01/11/2019 33.3* 40.0 - 53.0 % Final     MCV 01/11/2019 107* 78 - 100 fl Final     MCH 01/11/2019 34.6* 26.5 - 33.0 pg Final     MCHC 01/11/2019 32.4  31.5 - 36.5 g/dL Final     RDW 01/11/2019 18.2* 10.0 - 15.0 % Final     Platelet Count 01/11/2019 192  150 - 450 10e9/L Final     Diff Method 01/11/2019 Manual Differential   Final     % Neutrophils 01/11/2019 76.6  % Final     % Lymphocytes 01/11/2019 18.7  % Final     % Monocytes 01/11/2019 0.9  % Final     % Eosinophils 01/11/2019 1.9  % Final     % Basophils 01/11/2019 1.9  % Final     Absolute Neutrophil 01/11/2019 3.2  1.6 - 8.3 10e9/L Final     Absolute Lymphocytes 01/11/2019 0.8  0.8 - 5.3 10e9/L Final     Absolute Monocytes 01/11/2019 0.0  0.0 - 1.3 10e9/L Final     Absolute Eosinophils 01/11/2019 0.1  0.0 - 0.7 10e9/L Final     Absolute Basophils 01/11/2019 0.1  0.0 - 0.2 10e9/L Final     Anisocytosis 01/11/2019 Moderate   Final     Poikilocytosis 01/11/2019 Moderate   Final     Polychromasia 01/11/2019 Slight   Final     RBC Fragments 01/11/2019 Slight   Final     Bite Cells 01/11/2019 Slight   Final     Acanthocytes 01/11/2019 Slight   Final     Ovalocytes 01/11/2019 Moderate   Final     Macrocytes 01/11/2019 Present   Final     Platelet Estimate 01/11/2019 Confirming automated cell count   Final     Sodium 01/11/2019 139  133 - 144 mmol/L Final     Potassium 01/11/2019 4.3  3.4 - 5.3 mmol/L Final     Chloride 01/11/2019 106  94 - 109 mmol/L Final     Carbon Dioxide 01/11/2019 25  20 - 32 mmol/L Final     Anion Gap 01/11/2019 9  3 - 14 mmol/L Final     Glucose 01/11/2019 92  70 - 99 mg/dL Final     Urea Nitrogen 01/11/2019 18  7 - 30 mg/dL Final     Creatinine 01/11/2019 1.06  0.66 - 1.25 mg/dL Final     GFR Estimate 01/11/2019 69  >60 mL/min/[1.73_m2] Final     Comment: Non  GFR Calc  Starting 12/18/2018, serum creatinine based estimated GFR (eGFR) will be   calculated using the Chronic Kidney Disease Epidemiology Collaboration   (CKD-EPI) equation.       GFR Estimate If Black 01/11/2019 80  >60 mL/min/[1.73_m2] Final    Comment:  GFR Calc  Starting 12/18/2018, serum creatinine based estimated GFR (eGFR) will be   calculated using the Chronic Kidney Disease Epidemiology Collaboration   (CKD-EPI) equation.       Calcium 01/11/2019 8.5  8.5 - 10.1 mg/dL Final     Bilirubin Total 01/11/2019 1.0  0.2 - 1.3 mg/dL Final     Albumin 01/11/2019 3.8  3.4 - 5.0 g/dL Final     Protein Total 01/11/2019 7.2  6.8 - 8.8 g/dL Final     Alkaline Phosphatase 01/11/2019 62  40 - 150 U/L Final     ALT 01/11/2019 15  0 - 70 U/L Final     AST 01/11/2019 10  0 - 45 U/L Final       EXAMINATION: CT CHEST W/O CONTRAST  1/11/2019 10:20 AM       CLINICAL HISTORY: Lung cancer.     COMPARISON: 1/16/2018, 12/8/2015.     TECHNIQUE: CT imaging obtained through the chest without intravenous  contrast. Coronal and axial MIP reformatted images obtained.     FINDINGS:  Heart size is normal. Unchanged pericardial thickening.  Normal  thoracic vasculature. No thoracic lymphadenopathy. Small calcified  lymph nodes in the right hilum and mediastinum. Central  tracheobronchial tree is patent. No pleural effusion or pneumothorax.     Unchanged area of lung opacity anteriorly in the right upper lobe  associated with fibrosis, presumably related to radiation therapy, as  well as the approximately 2.6 x 1.8 cm area of consolidation  posteriorly in the right lung apex (series 2 image 8) adjacent to the  T1-T2 vertebral bodies since 1/16/2018.     Unchanged soft tissue thickening along the anterior and lateral  aspects of the trachea, is presumably radiation therapy related.     4 mm nodule in the medial right upper lobe on series 4 image 89 is  unchanged. No other new suspicious  pulmonary nodules. Moderate  emphysematous changes. Scattered areas of mild bronchial wall  thickening.     Bones and soft tissues: No suspicious bone findings. Mild anterior  wedge deformity of the L1 vertebral body. Heterogeneous appearance of  the vertebra related to demineralization.     Partially imaged upper abdomen: Limited.                                                                      IMPRESSION:   1. Unchanged appearance of both anterior and posterior right apical  lung opacities since prior examination, and has not significantly  changed since at least 12/8/2015, may represent treated, inactive  disease.  2. Unchanged 4 mm nodule in the right upper lobe.     I have personally reviewed the examination and initial interpretation  and I agree with the findings.     JONN OJEDA MD        Assessment/Plan  Stage IIIa NSCLC - He is now several years out from completion of his definitive therapy.  His scan today looks good and there are no concerning lesions.  We will see him back in one year with repeat CT chest.     HTN - his BP is a little on the high side today.  I suggested that he get his BP checked again while at home or in the pharmacy periodically.  I counseled them that I would not treat based on one high BP and he should follow up with his PCP.     Obstructive urinary symptoms - relatively mild, stable. Discussed bringing this up with his PCP.    The patient was seen and discussed with staff, Dr. Graham.    Jeff Ortiz MD  Resident, PGY-4  Department of Radiation Oncology  [facility]  [phone]    I saw and examined the patient with Dr. Ortiz and agree with his note above.  Remus is feeling well.  He has no new complaints.  His scan is ok with no evidence of recurrent disease.  He is now 4.5 years out and is likely cured.  We will have another follow up scan in one year.  He is agreeable to the plan.     Henry Graham   of Medicine  Division of  Hematology, Oncology, and Transplantation    I spent 25 minutes with the patient >50% of that time was spent in counseling and coordination of care.       Again, thank you for allowing me to participate in the care of your patient.      Sincerely,    Henry Graham, DO

## 2019-01-15 NOTE — NURSING NOTE
"Oncology Rooming Note    January 15, 2019 2:44 PM   Stew Crooks is a 73 year old male who presents for:    Chief Complaint   Patient presents with     Oncology Clinic Visit     1 Year F/U Lung Ca     Initial Vitals: /73 (BP Location: Left arm, Patient Position: Sitting, Cuff Size: Adult Regular)   Pulse 76   Temp 99  F (37.2  C) (Oral)   Resp 18   Ht 1.753 m (5' 9.02\")   Wt 71.5 kg (157 lb 11.2 oz)   SpO2 95%   BMI 23.28 kg/m   Estimated body mass index is 23.28 kg/m  as calculated from the following:    Height as of this encounter: 1.753 m (5' 9.02\").    Weight as of this encounter: 71.5 kg (157 lb 11.2 oz). Body surface area is 1.87 meters squared.  No Pain (0) Comment: Data Unavailable   No LMP for male patient.  Allergies reviewed: Yes  Medications reviewed: Yes    Medications: Medication refills not needed today.  Pharmacy name entered into [facility]:    [facility] [id number] [place] [address] [id number] AT [facility]  [id number] [place] [id number]    Clinical concerns: None, Dr Graham was NOT notified.    10 minutes for nursing intake (face to face time)     ALEXSANDRA JOE LPN            "

## 2019-01-15 NOTE — PROGRESS NOTES
REASON FOR VISIT:    CANCER STAGE: Cancer Staging  No matching staging information was found for the patient.      HISTORY OF PRESENT ILLNESS:  71-year-old non-English speaking Mongolian Male  -In [facility] was diagnosed with herpes zoster in 10/2013. Ever since that point he has continued to have a burning sensation and pain in his R arm which never improved. In [facility] he underwent MRI for further evaluation of a thoracic origin and was diagnosed with a R pancoast tumor. He then underwent a CT CAP which found a 5-cm mass in the right apex invading the T2-T3 vertebral bodies and probably ribs 2 and 3. This mass is abutting subclavian artery with no david invasion. He was told not much can be done for him so the patient and his wife traveled to [place] for further care.   - 3/31/2014 EUS, EBUS with FNA revealed adeno-squamous carcinoma. The 4R node was positive but not 4L or 7.   - EGFR mutations were negative  -MRI brain was negative  - 04/07/14 PET showed in addition to main mass, a hypermetabolic precarinal nodes which likely describes stations 4L and 4R, both enlarged with hypermetabolism with the addition of a nodular thickening along the anterior pleura measuring 1.3 x 0.8 cm with a max SUV of 7.1. There is no evidence of disease outside the chest.   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  - 6/2014 completed chemoradiation with cisplatin/Etoposide (6000 cGy in 30 fractions).  - 6/24/14 RIJ DVT associated with his port.   - Restaging scans showed a good response to treatment     - In Dec 2015 was noted to have new pancytopenia. Lab workup negative (hep B, C, HIV, CMV, TSH, Iron studies, ferritin, folic acid, Vitamin B12, retic, and blood smear). He had a bone marrow biopsy in Jan 2016. 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 Given 5 days of IVIG 2/8  "to 2/12.      Stew returns in follow up after one year.  He is now 4.5 years out from completion of chemorads.  He is doing well. He was in [place] over the summer and had a nice trip there.  He is really without significant complaints right now.  He is not having any shortness of breath. He eats well and is not losing weight.  His back pain is pretty stable and not really an issue right now. We also discussed his urinary symptoms which were brought up at his last visit. He has nocturia once nightly and some intermittency. Overall no significant change over the past 6 months. He otherwise has no other complaints.     Review Of Systems  10-point review of systems were negative except as noted in HPI.        EXAM:  /73 (BP Location: Left arm, Patient Position: Sitting, Cuff Size: Adult Regular)   Pulse 76   Temp 99  F (37.2  C) (Oral)   Resp 18   Ht 1.753 m (5' 9.02\")   Wt 71.5 kg (157 lb 11.2 oz)   SpO2 95%   BMI 23.28 kg/m    GEN: alert and oriented x 3, nad  HEENT: perrla, eomi, sclera anicteric, oral mucosa moist without thrush  NECK: supple, no palpable LAD  HT: reg rate and rhythm, no murmurs  LUNGS: clear to auscultation bilaterally  ABD: soft, nt, nd  EXT: no clubbing, cyanosis, or edema  NEURO: CN 2-12 intact, normal gait    Current Outpatient Medications   Medication Sig Dispense Refill     Acetaminophen (TYLENOL PO) Take 500 mg by mouth       ALBUTEROL 108 (90 BASE) MCG/ACT inhaler INHALE 2 PUFFS BY MOUTH EVERY 4 HOURS AS NEEDED  3     DULERA 100-5 MCG/ACT oral inhaler INHALE 2 PUFFS BY MOUTH TWICE DAILY  3     selenium sulfide (SELSUN) 2.5 % external lotion 1 APPLICATION TO AFFECTED AREA EXTERNALLY  2           Orders Only on 01/11/2019   Component Date Value Ref Range Status     WBC 01/11/2019 4.2  4.0 - 11.0 10e9/L Final     RBC Count 01/11/2019 3.12* 4.4 - 5.9 10e12/L Final     Hemoglobin 01/11/2019 10.8* 13.3 - 17.7 g/dL Final     Hematocrit 01/11/2019 33.3* 40.0 - 53.0 % Final     MCV " 01/11/2019 107* 78 - 100 fl Final     MCH 01/11/2019 34.6* 26.5 - 33.0 pg Final     MCHC 01/11/2019 32.4  31.5 - 36.5 g/dL Final     RDW 01/11/2019 18.2* 10.0 - 15.0 % Final     Platelet Count 01/11/2019 192  150 - 450 10e9/L Final     Diff Method 01/11/2019 Manual Differential   Final     % Neutrophils 01/11/2019 76.6  % Final     % Lymphocytes 01/11/2019 18.7  % Final     % Monocytes 01/11/2019 0.9  % Final     % Eosinophils 01/11/2019 1.9  % Final     % Basophils 01/11/2019 1.9  % Final     Absolute Neutrophil 01/11/2019 3.2  1.6 - 8.3 10e9/L Final     Absolute Lymphocytes 01/11/2019 0.8  0.8 - 5.3 10e9/L Final     Absolute Monocytes 01/11/2019 0.0  0.0 - 1.3 10e9/L Final     Absolute Eosinophils 01/11/2019 0.1  0.0 - 0.7 10e9/L Final     Absolute Basophils 01/11/2019 0.1  0.0 - 0.2 10e9/L Final     Anisocytosis 01/11/2019 Moderate   Final     Poikilocytosis 01/11/2019 Moderate   Final     Polychromasia 01/11/2019 Slight   Final     RBC Fragments 01/11/2019 Slight   Final     Bite Cells 01/11/2019 Slight   Final     Acanthocytes 01/11/2019 Slight   Final     Ovalocytes 01/11/2019 Moderate   Final     Macrocytes 01/11/2019 Present   Final     Platelet Estimate 01/11/2019 Confirming automated cell count   Final     Sodium 01/11/2019 139  133 - 144 mmol/L Final     Potassium 01/11/2019 4.3  3.4 - 5.3 mmol/L Final     Chloride 01/11/2019 106  94 - 109 mmol/L Final     Carbon Dioxide 01/11/2019 25  20 - 32 mmol/L Final     Anion Gap 01/11/2019 9  3 - 14 mmol/L Final     Glucose 01/11/2019 92  70 - 99 mg/dL Final     Urea Nitrogen 01/11/2019 18  7 - 30 mg/dL Final     Creatinine 01/11/2019 1.06  0.66 - 1.25 mg/dL Final     GFR Estimate 01/11/2019 69  >60 mL/min/[1.73_m2] Final    Comment: Non  GFR Calc  Starting 12/18/2018, serum creatinine based estimated GFR (eGFR) will be   calculated using the Chronic Kidney Disease Epidemiology Collaboration   (CKD-EPI) equation.       GFR Estimate If Black  01/11/2019 80  >60 mL/min/[1.73_m2] Final    Comment:  GFR Calc  Starting 12/18/2018, serum creatinine based estimated GFR (eGFR) will be   calculated using the Chronic Kidney Disease Epidemiology Collaboration   (CKD-EPI) equation.       Calcium 01/11/2019 8.5  8.5 - 10.1 mg/dL Final     Bilirubin Total 01/11/2019 1.0  0.2 - 1.3 mg/dL Final     Albumin 01/11/2019 3.8  3.4 - 5.0 g/dL Final     Protein Total 01/11/2019 7.2  6.8 - 8.8 g/dL Final     Alkaline Phosphatase 01/11/2019 62  40 - 150 U/L Final     ALT 01/11/2019 15  0 - 70 U/L Final     AST 01/11/2019 10  0 - 45 U/L Final       EXAMINATION: CT CHEST W/O CONTRAST  1/11/2019 10:20 AM       CLINICAL HISTORY: Lung cancer.     COMPARISON: 1/16/2018, 12/8/2015.     TECHNIQUE: CT imaging obtained through the chest without intravenous  contrast. Coronal and axial MIP reformatted images obtained.     FINDINGS:  Heart size is normal. Unchanged pericardial thickening.  Normal  thoracic vasculature. No thoracic lymphadenopathy. Small calcified  lymph nodes in the right hilum and mediastinum. Central  tracheobronchial tree is patent. No pleural effusion or pneumothorax.     Unchanged area of lung opacity anteriorly in the right upper lobe  associated with fibrosis, presumably related to radiation therapy, as  well as the approximately 2.6 x 1.8 cm area of consolidation  posteriorly in the right lung apex (series 2 image 8) adjacent to the  T1-T2 vertebral bodies since 1/16/2018.     Unchanged soft tissue thickening along the anterior and lateral  aspects of the trachea, is presumably radiation therapy related.     4 mm nodule in the medial right upper lobe on series 4 image 89 is  unchanged. No other new suspicious pulmonary nodules. Moderate  emphysematous changes. Scattered areas of mild bronchial wall  thickening.     Bones and soft tissues: No suspicious bone findings. Mild anterior  wedge deformity of the L1 vertebral body. Heterogeneous appearance  of  the vertebra related to demineralization.     Partially imaged upper abdomen: Limited.                                                                      IMPRESSION:   1. Unchanged appearance of both anterior and posterior right apical  lung opacities since prior examination, and has not significantly  changed since at least 12/8/2015, may represent treated, inactive  disease.  2. Unchanged 4 mm nodule in the right upper lobe.     I have personally reviewed the examination and initial interpretation  and I agree with the findings.     JONN OJEDA MD        Assessment/Plan  Stage IIIa NSCLC - He is now several years out from completion of his definitive therapy.  His scan today looks good and there are no concerning lesions.  We will see him back in one year with repeat CT chest.     HTN - his BP is a little on the high side today.  I suggested that he get his BP checked again while at home or in the pharmacy periodically.  I counseled them that I would not treat based on one high BP and he should follow up with his PCP.     Obstructive urinary symptoms - relatively mild, stable. Discussed bringing this up with his PCP.    The patient was seen and discussed with staff, Dr. Graham.    Jeff Ortiz MD  Resident, PGY-4  Department of Radiation Oncology  [facility]  [phone]    I saw and examined the patient with Dr. Ortiz and agree with his note above.  Remus is feeling well.  He has no new complaints.  His scan is ok with no evidence of recurrent disease.  He is now 4.5 years out and is likely cured.  We will have another follow up scan in one year.  He is agreeable to the plan.     Henry Graham   of Medicine  Division of Hematology, Oncology, and Transplantation    I spent 25 minutes with the patient >50% of that time was spent in counseling and coordination of care.

## 2019-01-15 NOTE — PATIENT INSTRUCTIONS
Preventive Care:    Colorectal Cancer Screening: During our visit today, we discussed that it is recommended you receive colorectal cancer screening. Please call or make an appointment with your primary care provider to discuss this. You may also call the [facility] scheduling line ([phone]) to set up a colonoscopy appointment.

## 2020-01-01 ENCOUNTER — ONCOLOGY VISIT (OUTPATIENT)
Dept: ONCOLOGY | Facility: CLINIC | Age: 75
End: 2020-01-01
Attending: PHYSICIAN ASSISTANT
Payer: COMMERCIAL

## 2020-01-01 ENCOUNTER — INFUSION THERAPY VISIT (OUTPATIENT)
Dept: ONCOLOGY | Facility: CLINIC | Age: 75
End: 2020-01-01
Attending: INTERNAL MEDICINE
Payer: COMMERCIAL

## 2020-01-01 ENCOUNTER — MYC MEDICAL ADVICE (OUTPATIENT)
Dept: ONCOLOGY | Facility: CLINIC | Age: 75
End: 2020-01-01

## 2020-01-01 ENCOUNTER — HEALTH MAINTENANCE LETTER (OUTPATIENT)
Age: 75
End: 2020-01-01

## 2020-01-01 ENCOUNTER — TRANSFERRED RECORDS (OUTPATIENT)
Dept: HEALTH INFORMATION MANAGEMENT | Facility: CLINIC | Age: 75
End: 2020-01-01

## 2020-01-01 ENCOUNTER — RESULTS ONLY (OUTPATIENT)
Dept: LAB | Age: 75
End: 2020-01-01

## 2020-01-01 ENCOUNTER — APPOINTMENT (OUTPATIENT)
Dept: GENERAL RADIOLOGY | Facility: CLINIC | Age: 75
End: 2020-01-01
Attending: INTERNAL MEDICINE
Payer: COMMERCIAL

## 2020-01-01 ENCOUNTER — NURSE TRIAGE (OUTPATIENT)
Dept: NURSING | Facility: CLINIC | Age: 75
End: 2020-01-01

## 2020-01-01 ENCOUNTER — APPOINTMENT (OUTPATIENT)
Dept: LAB | Facility: CLINIC | Age: 75
End: 2020-01-01
Attending: INTERNAL MEDICINE
Payer: COMMERCIAL

## 2020-01-01 ENCOUNTER — PATIENT OUTREACH (OUTPATIENT)
Dept: ONCOLOGY | Facility: CLINIC | Age: 75
End: 2020-01-01

## 2020-01-01 ENCOUNTER — TELEPHONE (OUTPATIENT)
Dept: ONCOLOGY | Facility: CLINIC | Age: 75
End: 2020-01-01

## 2020-01-01 ENCOUNTER — APPOINTMENT (OUTPATIENT)
Dept: GENERAL RADIOLOGY | Facility: CLINIC | Age: 75
End: 2020-01-01
Attending: EMERGENCY MEDICINE
Payer: COMMERCIAL

## 2020-01-01 ENCOUNTER — APPOINTMENT (OUTPATIENT)
Dept: LAB | Facility: CLINIC | Age: 75
End: 2020-01-01
Attending: FAMILY MEDICINE
Payer: COMMERCIAL

## 2020-01-01 ENCOUNTER — APPOINTMENT (OUTPATIENT)
Dept: OCCUPATIONAL THERAPY | Facility: CLINIC | Age: 75
End: 2020-01-01
Attending: PHYSICIAN ASSISTANT
Payer: COMMERCIAL

## 2020-01-01 ENCOUNTER — ONCOLOGY VISIT (OUTPATIENT)
Dept: ONCOLOGY | Facility: CLINIC | Age: 75
End: 2020-01-01
Attending: NURSE PRACTITIONER
Payer: COMMERCIAL

## 2020-01-01 ENCOUNTER — APPOINTMENT (OUTPATIENT)
Dept: SPEECH THERAPY | Facility: CLINIC | Age: 75
End: 2020-01-01
Payer: COMMERCIAL

## 2020-01-01 ENCOUNTER — ANCILLARY PROCEDURE (OUTPATIENT)
Dept: GENERAL RADIOLOGY | Facility: CLINIC | Age: 75
End: 2020-01-01
Attending: PHYSICIAN ASSISTANT
Payer: COMMERCIAL

## 2020-01-01 ENCOUNTER — TELEPHONE (OUTPATIENT)
Dept: GASTROENTEROLOGY | Facility: CLINIC | Age: 75
End: 2020-01-01

## 2020-01-01 ENCOUNTER — HOSPITAL ENCOUNTER (OUTPATIENT)
Facility: CLINIC | Age: 75
Setting detail: SPECIMEN
Discharge: HOME | End: 2020-04-07
Admitting: PHYSICIAN ASSISTANT
Payer: COMMERCIAL

## 2020-01-01 ENCOUNTER — MYC REFILL (OUTPATIENT)
Dept: ONCOLOGY | Facility: CLINIC | Age: 75
End: 2020-01-01

## 2020-01-01 ENCOUNTER — PATIENT OUTREACH (OUTPATIENT)
Dept: CARE COORDINATION | Facility: CLINIC | Age: 75
End: 2020-01-01

## 2020-01-01 ENCOUNTER — APPOINTMENT (OUTPATIENT)
Dept: LAB | Facility: CLINIC | Age: 75
End: 2020-01-01
Attending: NURSE PRACTITIONER
Payer: COMMERCIAL

## 2020-01-01 ENCOUNTER — OFFICE VISIT (OUTPATIENT)
Dept: URGENT CARE | Facility: URGENT CARE | Age: 75
End: 2020-01-01
Payer: COMMERCIAL

## 2020-01-01 ENCOUNTER — ANCILLARY PROCEDURE (OUTPATIENT)
Dept: CT IMAGING | Facility: CLINIC | Age: 75
End: 2020-01-01
Attending: INTERNAL MEDICINE
Payer: COMMERCIAL

## 2020-01-01 ENCOUNTER — TELEPHONE (OUTPATIENT)
Dept: SCHEDULING | Facility: CLINIC | Age: 75
End: 2020-01-01

## 2020-01-01 ENCOUNTER — TELEPHONE (OUTPATIENT)
Dept: CARE COORDINATION | Facility: CLINIC | Age: 75
End: 2020-01-01

## 2020-01-01 ENCOUNTER — INFUSION THERAPY VISIT (OUTPATIENT)
Dept: INFUSION THERAPY | Facility: CLINIC | Age: 75
End: 2020-01-01
Attending: INTERNAL MEDICINE
Payer: COMMERCIAL

## 2020-01-01 ENCOUNTER — PRE VISIT (OUTPATIENT)
Dept: ONCOLOGY | Facility: CLINIC | Age: 75
End: 2020-01-01

## 2020-01-01 ENCOUNTER — HOSPITAL ENCOUNTER (INPATIENT)
Facility: CLINIC | Age: 75
LOS: 3 days | Discharge: HOME | End: 2020-06-01
Attending: EMERGENCY MEDICINE | Admitting: INTERNAL MEDICINE
Payer: COMMERCIAL

## 2020-01-01 ENCOUNTER — APPOINTMENT (OUTPATIENT)
Dept: OCCUPATIONAL THERAPY | Facility: CLINIC | Age: 75
End: 2020-01-01
Payer: COMMERCIAL

## 2020-01-01 VITALS
RESPIRATION RATE: 16 BRPM | HEART RATE: 88 BPM | TEMPERATURE: 98.1 F | DIASTOLIC BLOOD PRESSURE: 66 MMHG | SYSTOLIC BLOOD PRESSURE: 125 MMHG | WEIGHT: 152.2 LBS | BODY MASS INDEX: 22.48 KG/M2 | OXYGEN SATURATION: 99 %

## 2020-01-01 VITALS
OXYGEN SATURATION: 97 % | DIASTOLIC BLOOD PRESSURE: 71 MMHG | HEART RATE: 68 BPM | SYSTOLIC BLOOD PRESSURE: 131 MMHG | RESPIRATION RATE: 18 BRPM | TEMPERATURE: 97.9 F

## 2020-01-01 VITALS
WEIGHT: 164.7 LBS | RESPIRATION RATE: 18 BRPM | BODY MASS INDEX: 24.4 KG/M2 | HEIGHT: 69 IN | HEART RATE: 78 BPM | TEMPERATURE: 98.7 F | DIASTOLIC BLOOD PRESSURE: 74 MMHG | OXYGEN SATURATION: 95 % | DIASTOLIC BLOOD PRESSURE: 52 MMHG | SYSTOLIC BLOOD PRESSURE: 154 MMHG | TEMPERATURE: 98.8 F | SYSTOLIC BLOOD PRESSURE: 134 MMHG | OXYGEN SATURATION: 94 % | RESPIRATION RATE: 20 BRPM | HEART RATE: 104 BPM

## 2020-01-01 VITALS
WEIGHT: 158.4 LBS | BODY MASS INDEX: 23.14 KG/M2 | OXYGEN SATURATION: 95 % | SYSTOLIC BLOOD PRESSURE: 138 MMHG | DIASTOLIC BLOOD PRESSURE: 80 MMHG | HEART RATE: 80 BPM | RESPIRATION RATE: 18 BRPM | TEMPERATURE: 98.7 F

## 2020-01-01 VITALS
OXYGEN SATURATION: 97 % | RESPIRATION RATE: 16 BRPM | TEMPERATURE: 98.1 F | DIASTOLIC BLOOD PRESSURE: 70 MMHG | HEART RATE: 71 BPM | SYSTOLIC BLOOD PRESSURE: 157 MMHG

## 2020-01-01 VITALS
TEMPERATURE: 98.4 F | SYSTOLIC BLOOD PRESSURE: 124 MMHG | RESPIRATION RATE: 20 BRPM | OXYGEN SATURATION: 96 % | DIASTOLIC BLOOD PRESSURE: 73 MMHG | HEART RATE: 86 BPM

## 2020-01-01 VITALS
RESPIRATION RATE: 16 BRPM | HEART RATE: 69 BPM | OXYGEN SATURATION: 99 % | SYSTOLIC BLOOD PRESSURE: 121 MMHG | DIASTOLIC BLOOD PRESSURE: 65 MMHG | TEMPERATURE: 98 F

## 2020-01-01 VITALS
DIASTOLIC BLOOD PRESSURE: 60 MMHG | WEIGHT: 152 LBS | BODY MASS INDEX: 22.45 KG/M2 | SYSTOLIC BLOOD PRESSURE: 134 MMHG | RESPIRATION RATE: 18 BRPM | HEART RATE: 58 BPM | TEMPERATURE: 98.8 F

## 2020-01-01 VITALS
TEMPERATURE: 98.5 F | RESPIRATION RATE: 18 BRPM | OXYGEN SATURATION: 98 % | DIASTOLIC BLOOD PRESSURE: 63 MMHG | SYSTOLIC BLOOD PRESSURE: 129 MMHG | HEART RATE: 74 BPM

## 2020-01-01 VITALS
TEMPERATURE: 97.8 F | BODY MASS INDEX: 22.09 KG/M2 | HEART RATE: 89 BPM | DIASTOLIC BLOOD PRESSURE: 64 MMHG | SYSTOLIC BLOOD PRESSURE: 120 MMHG | WEIGHT: 149.6 LBS | OXYGEN SATURATION: 98 % | RESPIRATION RATE: 20 BRPM

## 2020-01-01 VITALS
DIASTOLIC BLOOD PRESSURE: 69 MMHG | SYSTOLIC BLOOD PRESSURE: 151 MMHG | OXYGEN SATURATION: 95 % | RESPIRATION RATE: 16 BRPM | HEART RATE: 80 BPM | TEMPERATURE: 98.5 F

## 2020-01-01 VITALS
BODY MASS INDEX: 22.51 KG/M2 | RESPIRATION RATE: 14 BRPM | HEART RATE: 99 BPM | HEART RATE: 63 BPM | WEIGHT: 147.6 LBS | OXYGEN SATURATION: 96 % | WEIGHT: 152.4 LBS | SYSTOLIC BLOOD PRESSURE: 100 MMHG | SYSTOLIC BLOOD PRESSURE: 133 MMHG | DIASTOLIC BLOOD PRESSURE: 62 MMHG | TEMPERATURE: 98.3 F | DIASTOLIC BLOOD PRESSURE: 60 MMHG | BODY MASS INDEX: 21.8 KG/M2 | TEMPERATURE: 98.1 F | OXYGEN SATURATION: 97 % | RESPIRATION RATE: 16 BRPM

## 2020-01-01 VITALS
BODY MASS INDEX: 22.14 KG/M2 | DIASTOLIC BLOOD PRESSURE: 62 MMHG | WEIGHT: 149.9 LBS | HEART RATE: 88 BPM | SYSTOLIC BLOOD PRESSURE: 129 MMHG | TEMPERATURE: 98.1 F | OXYGEN SATURATION: 97 % | RESPIRATION RATE: 16 BRPM

## 2020-01-01 VITALS
RESPIRATION RATE: 20 BRPM | WEIGHT: 150.8 LBS | OXYGEN SATURATION: 97 % | BODY MASS INDEX: 22.27 KG/M2 | HEART RATE: 86 BPM | TEMPERATURE: 98 F | SYSTOLIC BLOOD PRESSURE: 112 MMHG | DIASTOLIC BLOOD PRESSURE: 57 MMHG

## 2020-01-01 VITALS
RESPIRATION RATE: 24 BRPM | SYSTOLIC BLOOD PRESSURE: 116 MMHG | OXYGEN SATURATION: 98 % | DIASTOLIC BLOOD PRESSURE: 68 MMHG | TEMPERATURE: 97.6 F

## 2020-01-01 VITALS
BODY MASS INDEX: 22.92 KG/M2 | DIASTOLIC BLOOD PRESSURE: 65 MMHG | SYSTOLIC BLOOD PRESSURE: 149 MMHG | OXYGEN SATURATION: 94 % | WEIGHT: 156.9 LBS | HEART RATE: 100 BPM | TEMPERATURE: 98.8 F

## 2020-01-01 VITALS
OXYGEN SATURATION: 96 % | DIASTOLIC BLOOD PRESSURE: 55 MMHG | RESPIRATION RATE: 16 BRPM | BODY MASS INDEX: 22.31 KG/M2 | WEIGHT: 151.1 LBS | SYSTOLIC BLOOD PRESSURE: 120 MMHG | BODY MASS INDEX: 23.41 KG/M2 | HEART RATE: 72 BPM | SYSTOLIC BLOOD PRESSURE: 132 MMHG | HEART RATE: 69 BPM | TEMPERATURE: 98.1 F | DIASTOLIC BLOOD PRESSURE: 72 MMHG | OXYGEN SATURATION: 96 % | TEMPERATURE: 97 F | WEIGHT: 160.2 LBS

## 2020-01-01 VITALS
RESPIRATION RATE: 18 BRPM | SYSTOLIC BLOOD PRESSURE: 115 MMHG | DIASTOLIC BLOOD PRESSURE: 50 MMHG | OXYGEN SATURATION: 97 % | TEMPERATURE: 98.2 F | HEART RATE: 74 BPM

## 2020-01-01 VITALS
RESPIRATION RATE: 18 BRPM | TEMPERATURE: 98.4 F | BODY MASS INDEX: 21.86 KG/M2 | WEIGHT: 148 LBS | HEART RATE: 75 BPM | OXYGEN SATURATION: 97 % | SYSTOLIC BLOOD PRESSURE: 116 MMHG | DIASTOLIC BLOOD PRESSURE: 61 MMHG

## 2020-01-01 VITALS
RESPIRATION RATE: 16 BRPM | HEART RATE: 82 BPM | DIASTOLIC BLOOD PRESSURE: 68 MMHG | TEMPERATURE: 98.3 F | SYSTOLIC BLOOD PRESSURE: 133 MMHG

## 2020-01-01 VITALS
DIASTOLIC BLOOD PRESSURE: 64 MMHG | SYSTOLIC BLOOD PRESSURE: 120 MMHG | HEIGHT: 69 IN | WEIGHT: 149.6 LBS | BODY MASS INDEX: 22.16 KG/M2

## 2020-01-01 VITALS
DIASTOLIC BLOOD PRESSURE: 69 MMHG | TEMPERATURE: 97.6 F | HEART RATE: 63 BPM | WEIGHT: 150 LBS | BODY MASS INDEX: 22.15 KG/M2 | OXYGEN SATURATION: 97 % | SYSTOLIC BLOOD PRESSURE: 138 MMHG | RESPIRATION RATE: 16 BRPM

## 2020-01-01 VITALS
BODY MASS INDEX: 22.38 KG/M2 | RESPIRATION RATE: 20 BRPM | SYSTOLIC BLOOD PRESSURE: 143 MMHG | OXYGEN SATURATION: 92 % | TEMPERATURE: 98.6 F | WEIGHT: 151.1 LBS | HEART RATE: 72 BPM | HEIGHT: 69 IN | DIASTOLIC BLOOD PRESSURE: 63 MMHG

## 2020-01-01 VITALS
RESPIRATION RATE: 18 BRPM | HEART RATE: 83 BPM | OXYGEN SATURATION: 99 % | SYSTOLIC BLOOD PRESSURE: 157 MMHG | DIASTOLIC BLOOD PRESSURE: 80 MMHG | TEMPERATURE: 98.2 F

## 2020-01-01 VITALS
DIASTOLIC BLOOD PRESSURE: 71 MMHG | HEART RATE: 67 BPM | TEMPERATURE: 97.8 F | RESPIRATION RATE: 18 BRPM | SYSTOLIC BLOOD PRESSURE: 155 MMHG | OXYGEN SATURATION: 96 %

## 2020-01-01 VITALS
HEART RATE: 67 BPM | SYSTOLIC BLOOD PRESSURE: 133 MMHG | DIASTOLIC BLOOD PRESSURE: 47 MMHG | OXYGEN SATURATION: 98 % | TEMPERATURE: 97.6 F | RESPIRATION RATE: 18 BRPM

## 2020-01-01 VITALS
BODY MASS INDEX: 22.46 KG/M2 | WEIGHT: 152.1 LBS | HEART RATE: 88 BPM | OXYGEN SATURATION: 98 % | TEMPERATURE: 98.2 F | DIASTOLIC BLOOD PRESSURE: 68 MMHG | SYSTOLIC BLOOD PRESSURE: 124 MMHG

## 2020-01-01 VITALS
RESPIRATION RATE: 18 BRPM | TEMPERATURE: 98.3 F | BODY MASS INDEX: 24.19 KG/M2 | HEART RATE: 94 BPM | WEIGHT: 163.3 LBS | HEIGHT: 69 IN | SYSTOLIC BLOOD PRESSURE: 146 MMHG | DIASTOLIC BLOOD PRESSURE: 71 MMHG | OXYGEN SATURATION: 96 %

## 2020-01-01 VITALS
RESPIRATION RATE: 16 BRPM | HEART RATE: 95 BPM | SYSTOLIC BLOOD PRESSURE: 132 MMHG | TEMPERATURE: 98.7 F | BODY MASS INDEX: 21.68 KG/M2 | OXYGEN SATURATION: 97 % | WEIGHT: 146.8 LBS | DIASTOLIC BLOOD PRESSURE: 75 MMHG

## 2020-01-01 VITALS
RESPIRATION RATE: 16 BRPM | WEIGHT: 151.6 LBS | TEMPERATURE: 98.1 F | DIASTOLIC BLOOD PRESSURE: 66 MMHG | SYSTOLIC BLOOD PRESSURE: 131 MMHG | OXYGEN SATURATION: 98 % | HEART RATE: 81 BPM | BODY MASS INDEX: 22.39 KG/M2

## 2020-01-01 VITALS — SYSTOLIC BLOOD PRESSURE: 140 MMHG | DIASTOLIC BLOOD PRESSURE: 65 MMHG | RESPIRATION RATE: 18 BRPM | TEMPERATURE: 98 F

## 2020-01-01 VITALS
BODY MASS INDEX: 23.08 KG/M2 | TEMPERATURE: 97.8 F | OXYGEN SATURATION: 98 % | HEART RATE: 96 BPM | DIASTOLIC BLOOD PRESSURE: 59 MMHG | RESPIRATION RATE: 16 BRPM | SYSTOLIC BLOOD PRESSURE: 129 MMHG | WEIGHT: 158 LBS

## 2020-01-01 VITALS
HEART RATE: 92 BPM | TEMPERATURE: 98.4 F | OXYGEN SATURATION: 94 % | HEIGHT: 69 IN | RESPIRATION RATE: 16 BRPM | SYSTOLIC BLOOD PRESSURE: 138 MMHG | WEIGHT: 161.3 LBS | BODY MASS INDEX: 23.89 KG/M2 | DIASTOLIC BLOOD PRESSURE: 68 MMHG

## 2020-01-01 VITALS
SYSTOLIC BLOOD PRESSURE: 139 MMHG | RESPIRATION RATE: 16 BRPM | OXYGEN SATURATION: 95 % | TEMPERATURE: 97.9 F | DIASTOLIC BLOOD PRESSURE: 63 MMHG | HEART RATE: 79 BPM

## 2020-01-01 VITALS
TEMPERATURE: 97.4 F | WEIGHT: 156.7 LBS | HEART RATE: 92 BPM | SYSTOLIC BLOOD PRESSURE: 152 MMHG | BODY MASS INDEX: 22.89 KG/M2 | DIASTOLIC BLOOD PRESSURE: 59 MMHG | OXYGEN SATURATION: 94 %

## 2020-01-01 VITALS
HEART RATE: 92 BPM | RESPIRATION RATE: 14 BRPM | BODY MASS INDEX: 23.6 KG/M2 | OXYGEN SATURATION: 94 % | TEMPERATURE: 98.1 F | SYSTOLIC BLOOD PRESSURE: 155 MMHG | DIASTOLIC BLOOD PRESSURE: 66 MMHG | WEIGHT: 161.5 LBS

## 2020-01-01 VITALS
TEMPERATURE: 98.4 F | DIASTOLIC BLOOD PRESSURE: 68 MMHG | RESPIRATION RATE: 16 BRPM | SYSTOLIC BLOOD PRESSURE: 116 MMHG | OXYGEN SATURATION: 94 % | HEART RATE: 68 BPM

## 2020-01-01 VITALS
RESPIRATION RATE: 16 BRPM | TEMPERATURE: 98.4 F | OXYGEN SATURATION: 95 % | HEART RATE: 72 BPM | SYSTOLIC BLOOD PRESSURE: 116 MMHG | DIASTOLIC BLOOD PRESSURE: 70 MMHG

## 2020-01-01 VITALS
BODY MASS INDEX: 22.75 KG/M2 | WEIGHT: 153.6 LBS | HEIGHT: 69 IN | TEMPERATURE: 98.3 F | OXYGEN SATURATION: 97 % | SYSTOLIC BLOOD PRESSURE: 130 MMHG | DIASTOLIC BLOOD PRESSURE: 61 MMHG | RESPIRATION RATE: 16 BRPM | HEART RATE: 78 BPM

## 2020-01-01 VITALS — BODY MASS INDEX: 22.65 KG/M2 | WEIGHT: 155 LBS

## 2020-01-01 VITALS
TEMPERATURE: 98.2 F | OXYGEN SATURATION: 98 % | HEART RATE: 56 BPM | SYSTOLIC BLOOD PRESSURE: 132 MMHG | DIASTOLIC BLOOD PRESSURE: 62 MMHG | RESPIRATION RATE: 18 BRPM

## 2020-01-01 DIAGNOSIS — D46.9 MDS (MYELODYSPLASTIC SYNDROME) (H): Primary | ICD-10-CM

## 2020-01-01 DIAGNOSIS — J18.9 PNEUMONIA OF RIGHT MIDDLE LOBE DUE TO INFECTIOUS ORGANISM: ICD-10-CM

## 2020-01-01 DIAGNOSIS — Z85.118 PERSONAL HISTORY OF MALIGNANT NEOPLASM OF BRONCHUS AND LUNG: ICD-10-CM

## 2020-01-01 DIAGNOSIS — D63.8 ANEMIA IN OTHER CHRONIC DISEASES CLASSIFIED ELSEWHERE: ICD-10-CM

## 2020-01-01 DIAGNOSIS — D64.9 HEMOGLOBIN LOW: ICD-10-CM

## 2020-01-01 DIAGNOSIS — D46.9 MDS (MYELODYSPLASTIC SYNDROME) (H): ICD-10-CM

## 2020-01-01 DIAGNOSIS — D63.8 ANEMIA IN OTHER CHRONIC DISEASES CLASSIFIED ELSEWHERE: Primary | ICD-10-CM

## 2020-01-01 DIAGNOSIS — D64.9 HEMOGLOBIN LOW: Primary | ICD-10-CM

## 2020-01-01 DIAGNOSIS — D69.6 THROMBOCYTOPENIA (H): Primary | ICD-10-CM

## 2020-01-01 DIAGNOSIS — D69.6 THROMBOCYTOPENIA (H): ICD-10-CM

## 2020-01-01 DIAGNOSIS — D70.9 NEUTROPENIC FEVER (H): Primary | ICD-10-CM

## 2020-01-01 DIAGNOSIS — Z87.891 PERSONAL HISTORY OF TOBACCO USE, PRESENTING HAZARDS TO HEALTH: ICD-10-CM

## 2020-01-01 DIAGNOSIS — C34.11 MALIGNANT NEOPLASM OF UPPER LOBE OF RIGHT LUNG (H): Primary | ICD-10-CM

## 2020-01-01 DIAGNOSIS — R50.81 NEUTROPENIC FEVER (H): Primary | ICD-10-CM

## 2020-01-01 DIAGNOSIS — J18.8 OTHER PNEUMONIA, UNSPECIFIED ORGANISM: ICD-10-CM

## 2020-01-01 DIAGNOSIS — D46.9 MDS (MYELODYSPLASTIC SYNDROME) (H): Chronic | ICD-10-CM

## 2020-01-01 DIAGNOSIS — Z23 NEED FOR PROPHYLACTIC VACCINATION AND INOCULATION AGAINST INFLUENZA: ICD-10-CM

## 2020-01-01 DIAGNOSIS — R05.9 COUGH: Primary | ICD-10-CM

## 2020-01-01 DIAGNOSIS — D46.9 MDS (MYELODYSPLASTIC SYNDROME) (H): Primary | Chronic | ICD-10-CM

## 2020-01-01 DIAGNOSIS — R05.9 COUGH: ICD-10-CM

## 2020-01-01 DIAGNOSIS — C34.90 LUNG CANCER (H): Primary | ICD-10-CM

## 2020-01-01 DIAGNOSIS — C34.90 LUNG CANCER (H): ICD-10-CM

## 2020-01-01 DIAGNOSIS — C34.11 MALIGNANT NEOPLASM OF UPPER LOBE OF RIGHT LUNG (H): ICD-10-CM

## 2020-01-01 DIAGNOSIS — R50.81 NEUTROPENIC FEVER (H): ICD-10-CM

## 2020-01-01 DIAGNOSIS — D70.9 NEUTROPENIC FEVER (H): ICD-10-CM

## 2020-01-01 DIAGNOSIS — Z53.9 ERRONEOUS ENCOUNTER--DISREGARD: Primary | ICD-10-CM

## 2020-01-01 DIAGNOSIS — Z20.828 EXPOSURE TO SARS-ASSOCIATED CORONAVIRUS: ICD-10-CM

## 2020-01-01 LAB
ABO + RH BLD: NORMAL
ACANTHOCYTES BLD QL SMEAR: ABNORMAL
ACANTHOCYTES BLD QL SMEAR: SLIGHT
ALBUMIN SERPL-MCNC: 2.8 G/DL (ref 3.4–5)
ALBUMIN SERPL-MCNC: 3 G/DL (ref 3.4–5)
ALBUMIN SERPL-MCNC: 3.1 G/DL (ref 3.4–5)
ALBUMIN SERPL-MCNC: 3.2 G/DL (ref 3.4–5)
ALBUMIN SERPL-MCNC: 3.3 G/DL (ref 3.4–5)
ALBUMIN SERPL-MCNC: 3.5 G/DL (ref 3.4–5)
ALBUMIN SERPL-MCNC: 3.8 G/DL (ref 3.4–5)
ALBUMIN SERPL-MCNC: 3.9 G/DL (ref 3.4–5)
ALBUMIN UR-MCNC: NEGATIVE MG/DL
ALP SERPL-CCNC: 106 U/L (ref 40–150)
ALP SERPL-CCNC: 108 U/L (ref 40–150)
ALP SERPL-CCNC: 49 U/L (ref 40–150)
ALP SERPL-CCNC: 68 U/L (ref 40–150)
ALP SERPL-CCNC: 68 U/L (ref 40–150)
ALP SERPL-CCNC: 71 U/L (ref 40–150)
ALP SERPL-CCNC: 71 U/L (ref 40–150)
ALP SERPL-CCNC: 76 U/L (ref 40–150)
ALP SERPL-CCNC: 76 U/L (ref 40–150)
ALP SERPL-CCNC: 78 U/L (ref 40–150)
ALP SERPL-CCNC: 84 U/L (ref 40–150)
ALP SERPL-CCNC: 98 U/L (ref 40–150)
ALP SERPL-CCNC: 99 U/L (ref 40–150)
ALT SERPL W P-5'-P-CCNC: 17 U/L (ref 0–70)
ALT SERPL W P-5'-P-CCNC: 18 U/L (ref 0–70)
ALT SERPL W P-5'-P-CCNC: 19 U/L (ref 0–70)
ALT SERPL W P-5'-P-CCNC: 20 U/L (ref 0–70)
ALT SERPL W P-5'-P-CCNC: 20 U/L (ref 0–70)
ALT SERPL W P-5'-P-CCNC: 31 U/L (ref 0–70)
ANION GAP SERPL CALCULATED.3IONS-SCNC: 3 MMOL/L (ref 3–14)
ANION GAP SERPL CALCULATED.3IONS-SCNC: 4 MMOL/L (ref 3–14)
ANION GAP SERPL CALCULATED.3IONS-SCNC: 5 MMOL/L (ref 3–14)
ANION GAP SERPL CALCULATED.3IONS-SCNC: 6 MMOL/L (ref 3–14)
ANION GAP SERPL CALCULATED.3IONS-SCNC: 7 MMOL/L (ref 3–14)
ANION GAP SERPL CALCULATED.3IONS-SCNC: 8 MMOL/L (ref 3–14)
ANION GAP SERPL CALCULATED.3IONS-SCNC: 8 MMOL/L (ref 3–14)
ANION GAP SERPL CALCULATED.3IONS-SCNC: 9 MMOL/L (ref 3–14)
ANISOCYTOSIS BLD QL SMEAR: ABNORMAL
ANISOCYTOSIS BLD QL SMEAR: ABNORMAL
ANISOCYTOSIS BLD QL SMEAR: SLIGHT
APPEARANCE UR: CLEAR
APTT PPP: 32 SEC (ref 22–37)
APTT PPP: 33 SEC (ref 22–37)
APTT PPP: 33 SEC (ref 22–37)
AST SERPL W P-5'-P-CCNC: 10 U/L (ref 0–45)
AST SERPL W P-5'-P-CCNC: 11 U/L (ref 0–45)
AST SERPL W P-5'-P-CCNC: 11 U/L (ref 0–45)
AST SERPL W P-5'-P-CCNC: 12 U/L (ref 0–45)
AST SERPL W P-5'-P-CCNC: 15 U/L (ref 0–45)
AST SERPL W P-5'-P-CCNC: 6 U/L (ref 0–45)
AST SERPL W P-5'-P-CCNC: 6 U/L (ref 0–45)
AST SERPL W P-5'-P-CCNC: 7 U/L (ref 0–45)
AST SERPL W P-5'-P-CCNC: 8 U/L (ref 0–45)
AST SERPL W P-5'-P-CCNC: 9 U/L (ref 0–45)
BACTERIA SPEC CULT: ABNORMAL
BACTERIA SPEC CULT: ABNORMAL
BACTERIA SPEC CULT: NO GROWTH
BACTERIA SPEC CULT: NORMAL
BASOPHILS # BLD AUTO: 0 10E9/L (ref 0–0.2)
BASOPHILS # BLD AUTO: 0.1 10E9/L (ref 0–0.2)
BASOPHILS NFR BLD AUTO: 0 %
BASOPHILS NFR BLD AUTO: 0.5 %
BASOPHILS NFR BLD AUTO: 0.6 %
BASOPHILS NFR BLD AUTO: 0.9 %
BASOPHILS NFR BLD AUTO: 1 %
BASOPHILS NFR BLD AUTO: 1.1 %
BASOPHILS NFR BLD AUTO: 1.7 %
BASOPHILS NFR BLD AUTO: 1.8 %
BASOPHILS NFR BLD AUTO: 1.8 %
BASOPHILS NFR BLD AUTO: 2.7 %
BILIRUB SERPL-MCNC: 0.5 MG/DL (ref 0.2–1.3)
BILIRUB SERPL-MCNC: 0.5 MG/DL (ref 0.2–1.3)
BILIRUB SERPL-MCNC: 0.6 MG/DL (ref 0.2–1.3)
BILIRUB SERPL-MCNC: 0.7 MG/DL (ref 0.2–1.3)
BILIRUB SERPL-MCNC: 0.8 MG/DL (ref 0.2–1.3)
BILIRUB SERPL-MCNC: 0.8 MG/DL (ref 0.2–1.3)
BILIRUB SERPL-MCNC: 0.9 MG/DL (ref 0.2–1.3)
BILIRUB SERPL-MCNC: 1 MG/DL (ref 0.2–1.3)
BILIRUB SERPL-MCNC: 1.1 MG/DL (ref 0.2–1.3)
BILIRUB UR QL STRIP: NEGATIVE
BLASTS # BLD: 0 10E9/L
BLASTS # BLD: 0.1 10E9/L
BLASTS # BLD: 0.2 10E9/L
BLASTS # BLD: 0.2 10E9/L
BLASTS BLD QL SMEAR: 0.9 %
BLASTS BLD QL SMEAR: 0.9 %
BLASTS BLD QL SMEAR: 1.7 %
BLASTS BLD QL SMEAR: 1.8 %
BLASTS BLD QL SMEAR: 10.4 %
BLASTS BLD QL SMEAR: 10.8 %
BLASTS BLD QL SMEAR: 14.9 %
BLASTS BLD QL SMEAR: 3.1 %
BLASTS BLD QL SMEAR: 3.4 %
BLASTS BLD QL SMEAR: 3.5 %
BLASTS BLD QL SMEAR: 3.6 %
BLASTS BLD QL SMEAR: 4 %
BLASTS BLD QL SMEAR: 4.3 %
BLASTS BLD QL SMEAR: 4.4 %
BLASTS BLD QL SMEAR: 4.5 %
BLASTS BLD QL SMEAR: 4.6 %
BLASTS BLD QL SMEAR: 5 %
BLASTS BLD QL SMEAR: 5.3 %
BLASTS BLD QL SMEAR: 6 %
BLASTS BLD QL SMEAR: 6 %
BLASTS BLD QL SMEAR: 6.3 %
BLASTS BLD QL SMEAR: 7.8 %
BLASTS BLD QL SMEAR: 7.8 %
BLASTS BLD QL SMEAR: 8.8 %
BLASTS BLD QL SMEAR: 9.6 %
BLD GP AB SCN SERPL QL: NORMAL
BLD PROD TYP BPU: NORMAL
BLD UNIT ID BPU: 0
BLOOD BANK CMNT PATIENT-IMP: NORMAL
BLOOD PRODUCT CODE: NORMAL
BPU ID: NORMAL
BUN SERPL-MCNC: 10 MG/DL (ref 7–30)
BUN SERPL-MCNC: 14 MG/DL (ref 7–30)
BUN SERPL-MCNC: 15 MG/DL (ref 7–30)
BUN SERPL-MCNC: 19 MG/DL (ref 7–30)
BUN SERPL-MCNC: 20 MG/DL (ref 7–30)
BUN SERPL-MCNC: 20 MG/DL (ref 7–30)
BUN SERPL-MCNC: 21 MG/DL (ref 7–30)
BUN SERPL-MCNC: 22 MG/DL (ref 7–30)
BUN SERPL-MCNC: 23 MG/DL (ref 7–30)
BUN SERPL-MCNC: 24 MG/DL (ref 7–30)
BUN SERPL-MCNC: 24 MG/DL (ref 7–30)
BUN SERPL-MCNC: 25 MG/DL (ref 7–30)
BUN SERPL-MCNC: 27 MG/DL (ref 7–30)
BUN SERPL-MCNC: 29 MG/DL (ref 7–30)
BUN SERPL-MCNC: 41 MG/DL (ref 7–30)
BURR CELLS BLD QL SMEAR: ABNORMAL
BURR CELLS BLD QL SMEAR: SLIGHT
C DIFF TOX B STL QL: NEGATIVE
CALCIUM SERPL-MCNC: 8 MG/DL (ref 8.5–10.1)
CALCIUM SERPL-MCNC: 8.1 MG/DL (ref 8.5–10.1)
CALCIUM SERPL-MCNC: 8.2 MG/DL (ref 8.5–10.1)
CALCIUM SERPL-MCNC: 8.2 MG/DL (ref 8.5–10.1)
CALCIUM SERPL-MCNC: 8.4 MG/DL (ref 8.5–10.1)
CALCIUM SERPL-MCNC: 8.5 MG/DL (ref 8.5–10.1)
CALCIUM SERPL-MCNC: 8.6 MG/DL (ref 8.5–10.1)
CALCIUM SERPL-MCNC: 8.7 MG/DL (ref 8.5–10.1)
CALCIUM SERPL-MCNC: 8.7 MG/DL (ref 8.5–10.1)
CALCIUM SERPL-MCNC: 8.8 MG/DL (ref 8.5–10.1)
CALCIUM SERPL-MCNC: 8.9 MG/DL (ref 8.5–10.1)
CALCIUM SERPL-MCNC: 8.9 MG/DL (ref 8.5–10.1)
CALCIUM SERPL-MCNC: 9.1 MG/DL (ref 8.5–10.1)
CALCIUM SERPL-MCNC: 9.1 MG/DL (ref 8.5–10.1)
CHLORIDE SERPL-SCNC: 106 MMOL/L (ref 94–109)
CHLORIDE SERPL-SCNC: 108 MMOL/L (ref 94–109)
CHLORIDE SERPL-SCNC: 109 MMOL/L (ref 94–109)
CHLORIDE SERPL-SCNC: 110 MMOL/L (ref 94–109)
CHLORIDE SERPL-SCNC: 111 MMOL/L (ref 94–109)
CHLORIDE SERPL-SCNC: 112 MMOL/L (ref 94–109)
CHLORIDE SERPL-SCNC: 113 MMOL/L (ref 94–109)
CO2 SERPL-SCNC: 20 MMOL/L (ref 20–32)
CO2 SERPL-SCNC: 22 MMOL/L (ref 20–32)
CO2 SERPL-SCNC: 23 MMOL/L (ref 20–32)
CO2 SERPL-SCNC: 24 MMOL/L (ref 20–32)
CO2 SERPL-SCNC: 25 MMOL/L (ref 20–32)
CO2 SERPL-SCNC: 26 MMOL/L (ref 20–32)
COLOR UR AUTO: YELLOW
COPATH REPORT: NORMAL
CREAT SERPL-MCNC: 0.91 MG/DL (ref 0.66–1.25)
CREAT SERPL-MCNC: 0.94 MG/DL (ref 0.66–1.25)
CREAT SERPL-MCNC: 0.96 MG/DL (ref 0.66–1.25)
CREAT SERPL-MCNC: 0.96 MG/DL (ref 0.66–1.25)
CREAT SERPL-MCNC: 0.97 MG/DL (ref 0.66–1.25)
CREAT SERPL-MCNC: 0.98 MG/DL (ref 0.66–1.25)
CREAT SERPL-MCNC: 0.98 MG/DL (ref 0.66–1.25)
CREAT SERPL-MCNC: 1 MG/DL (ref 0.66–1.25)
CREAT SERPL-MCNC: 1 MG/DL (ref 0.66–1.25)
CREAT SERPL-MCNC: 1.06 MG/DL (ref 0.66–1.25)
CREAT SERPL-MCNC: 1.07 MG/DL (ref 0.66–1.25)
CREAT SERPL-MCNC: 1.08 MG/DL (ref 0.66–1.25)
CREAT SERPL-MCNC: 1.09 MG/DL (ref 0.66–1.25)
CREAT SERPL-MCNC: 1.09 MG/DL (ref 0.66–1.25)
CREAT SERPL-MCNC: 1.1 MG/DL (ref 0.66–1.25)
CREAT SERPL-MCNC: 1.11 MG/DL (ref 0.66–1.25)
CREAT SERPL-MCNC: 1.14 MG/DL (ref 0.66–1.25)
CREAT SERPL-MCNC: 1.2 MG/DL (ref 0.66–1.25)
CREAT SERPL-MCNC: 1.24 MG/DL (ref 0.66–1.25)
CRP SERPL-MCNC: 97.8 MG/L (ref 0–8)
DACRYOCYTES BLD QL SMEAR: ABNORMAL
DACRYOCYTES BLD QL SMEAR: ABNORMAL
DACRYOCYTES BLD QL SMEAR: SLIGHT
DAT POLY-SP REAG RBC QL: NORMAL
DIFFERENTIAL METHOD BLD: ABNORMAL
ELLIPTOCYTES BLD QL SMEAR: SLIGHT
EOSINOPHIL # BLD AUTO: 0 10E9/L (ref 0–0.7)
EOSINOPHIL NFR BLD AUTO: 0 %
EOSINOPHIL NFR BLD AUTO: 0.5 %
EOSINOPHIL NFR BLD AUTO: 0.9 %
EOSINOPHIL NFR BLD AUTO: 0.9 %
EOSINOPHIL NFR BLD AUTO: 1 %
EOSINOPHIL NFR BLD AUTO: 1.6 %
EOSINOPHIL NFR BLD AUTO: 1.7 %
EOSINOPHIL NFR BLD AUTO: 1.8 %
EPO SERPL-ACNC: 1200 MU/ML (ref 4–27)
EPO SERPL-ACNC: 1711 MU/ML (ref 4–27)
ERYTHROCYTE [DISTWIDTH] IN BLOOD BY AUTOMATED COUNT: 13.2 % (ref 10–15)
ERYTHROCYTE [DISTWIDTH] IN BLOOD BY AUTOMATED COUNT: 13.5 % (ref 10–15)
ERYTHROCYTE [DISTWIDTH] IN BLOOD BY AUTOMATED COUNT: 13.6 % (ref 10–15)
ERYTHROCYTE [DISTWIDTH] IN BLOOD BY AUTOMATED COUNT: 13.6 % (ref 10–15)
ERYTHROCYTE [DISTWIDTH] IN BLOOD BY AUTOMATED COUNT: 13.7 % (ref 10–15)
ERYTHROCYTE [DISTWIDTH] IN BLOOD BY AUTOMATED COUNT: 13.8 % (ref 10–15)
ERYTHROCYTE [DISTWIDTH] IN BLOOD BY AUTOMATED COUNT: 13.8 % (ref 10–15)
ERYTHROCYTE [DISTWIDTH] IN BLOOD BY AUTOMATED COUNT: 13.9 % (ref 10–15)
ERYTHROCYTE [DISTWIDTH] IN BLOOD BY AUTOMATED COUNT: 14 % (ref 10–15)
ERYTHROCYTE [DISTWIDTH] IN BLOOD BY AUTOMATED COUNT: 14.1 % (ref 10–15)
ERYTHROCYTE [DISTWIDTH] IN BLOOD BY AUTOMATED COUNT: 14.1 % (ref 10–15)
ERYTHROCYTE [DISTWIDTH] IN BLOOD BY AUTOMATED COUNT: 14.2 % (ref 10–15)
ERYTHROCYTE [DISTWIDTH] IN BLOOD BY AUTOMATED COUNT: 14.3 % (ref 10–15)
ERYTHROCYTE [DISTWIDTH] IN BLOOD BY AUTOMATED COUNT: 14.6 % (ref 10–15)
ERYTHROCYTE [DISTWIDTH] IN BLOOD BY AUTOMATED COUNT: 14.8 % (ref 10–15)
ERYTHROCYTE [DISTWIDTH] IN BLOOD BY AUTOMATED COUNT: 15.1 % (ref 10–15)
ERYTHROCYTE [DISTWIDTH] IN BLOOD BY AUTOMATED COUNT: 15.7 % (ref 10–15)
ERYTHROCYTE [DISTWIDTH] IN BLOOD BY AUTOMATED COUNT: 17.4 % (ref 10–15)
ERYTHROCYTE [DISTWIDTH] IN BLOOD BY AUTOMATED COUNT: 17.7 % (ref 10–15)
ERYTHROCYTE [DISTWIDTH] IN BLOOD BY AUTOMATED COUNT: 18.3 % (ref 10–15)
ERYTHROCYTE [DISTWIDTH] IN BLOOD BY AUTOMATED COUNT: 19.3 % (ref 10–15)
ERYTHROCYTE [DISTWIDTH] IN BLOOD BY AUTOMATED COUNT: 22.5 % (ref 10–15)
ERYTHROCYTE [DISTWIDTH] IN BLOOD BY AUTOMATED COUNT: 23 % (ref 10–15)
FERRITIN SERPL-MCNC: 1254 NG/ML (ref 26–388)
FERRITIN SERPL-MCNC: 1826 NG/ML (ref 26–388)
FERRITIN SERPL-MCNC: 1912 NG/ML (ref 26–388)
FERRITIN SERPL-MCNC: 318 NG/ML (ref 26–388)
FIBRINOGEN PPP-MCNC: 413 MG/DL (ref 200–420)
FIBRINOGEN PPP-MCNC: 563 MG/DL (ref 200–420)
FIBRINOGEN PPP-MCNC: 638 MG/DL (ref 200–420)
FOLATE SERPL-MCNC: 15.3 NG/ML
GFR SERPL CREATININE-BSD FRML MDRD: 56 ML/MIN/{1.73_M2}
GFR SERPL CREATININE-BSD FRML MDRD: 59 ML/MIN/{1.73_M2}
GFR SERPL CREATININE-BSD FRML MDRD: 63 ML/MIN/{1.73_M2}
GFR SERPL CREATININE-BSD FRML MDRD: 65 ML/MIN/{1.73_M2}
GFR SERPL CREATININE-BSD FRML MDRD: 65 ML/MIN/{1.73_M2}
GFR SERPL CREATININE-BSD FRML MDRD: 66 ML/MIN/{1.73_M2}
GFR SERPL CREATININE-BSD FRML MDRD: 66 ML/MIN/{1.73_M2}
GFR SERPL CREATININE-BSD FRML MDRD: 67 ML/MIN/{1.73_M2}
GFR SERPL CREATININE-BSD FRML MDRD: 67 ML/MIN/{1.73_M2}
GFR SERPL CREATININE-BSD FRML MDRD: 68 ML/MIN/{1.73_M2}
GFR SERPL CREATININE-BSD FRML MDRD: 73 ML/MIN/{1.73_M2}
GFR SERPL CREATININE-BSD FRML MDRD: 74 ML/MIN/{1.73_M2}
GFR SERPL CREATININE-BSD FRML MDRD: 75 ML/MIN/{1.73_M2}
GFR SERPL CREATININE-BSD FRML MDRD: 75 ML/MIN/{1.73_M2}
GFR SERPL CREATININE-BSD FRML MDRD: 76 ML/MIN/{1.73_M2}
GFR SERPL CREATININE-BSD FRML MDRD: 77 ML/MIN/{1.73_M2}
GFR SERPL CREATININE-BSD FRML MDRD: 77 ML/MIN/{1.73_M2}
GFR SERPL CREATININE-BSD FRML MDRD: 79 ML/MIN/{1.73_M2}
GFR SERPL CREATININE-BSD FRML MDRD: 82 ML/MIN/{1.73_M2}
GLUCOSE BLDC GLUCOMTR-MCNC: 97 MG/DL (ref 70–99)
GLUCOSE SERPL-MCNC: 100 MG/DL (ref 70–99)
GLUCOSE SERPL-MCNC: 105 MG/DL (ref 70–99)
GLUCOSE SERPL-MCNC: 105 MG/DL (ref 70–99)
GLUCOSE SERPL-MCNC: 109 MG/DL (ref 70–99)
GLUCOSE SERPL-MCNC: 109 MG/DL (ref 70–99)
GLUCOSE SERPL-MCNC: 110 MG/DL (ref 70–99)
GLUCOSE SERPL-MCNC: 110 MG/DL (ref 70–99)
GLUCOSE SERPL-MCNC: 113 MG/DL (ref 70–99)
GLUCOSE SERPL-MCNC: 113 MG/DL (ref 70–99)
GLUCOSE SERPL-MCNC: 114 MG/DL (ref 70–99)
GLUCOSE SERPL-MCNC: 114 MG/DL (ref 70–99)
GLUCOSE SERPL-MCNC: 120 MG/DL (ref 70–99)
GLUCOSE SERPL-MCNC: 124 MG/DL (ref 70–99)
GLUCOSE SERPL-MCNC: 130 MG/DL (ref 70–99)
GLUCOSE SERPL-MCNC: 136 MG/DL (ref 70–99)
GLUCOSE SERPL-MCNC: 143 MG/DL (ref 70–99)
GLUCOSE SERPL-MCNC: 150 MG/DL (ref 70–99)
GLUCOSE SERPL-MCNC: 152 MG/DL (ref 70–99)
GLUCOSE SERPL-MCNC: 158 MG/DL (ref 70–99)
GLUCOSE SERPL-MCNC: 89 MG/DL (ref 70–99)
GLUCOSE SERPL-MCNC: 98 MG/DL (ref 70–99)
GLUCOSE UR STRIP-MCNC: NEGATIVE MG/DL
GRAM STN SPEC: NORMAL
HAPTOGLOB SERPL-MCNC: 290 MG/DL (ref 32–197)
HCT VFR BLD AUTO: 20.8 % (ref 40–53)
HCT VFR BLD AUTO: 20.9 % (ref 40–53)
HCT VFR BLD AUTO: 21.6 % (ref 40–53)
HCT VFR BLD AUTO: 21.8 % (ref 40–53)
HCT VFR BLD AUTO: 22 % (ref 40–53)
HCT VFR BLD AUTO: 22.2 % (ref 40–53)
HCT VFR BLD AUTO: 22.4 % (ref 40–53)
HCT VFR BLD AUTO: 22.6 % (ref 40–53)
HCT VFR BLD AUTO: 22.8 % (ref 40–53)
HCT VFR BLD AUTO: 23.1 % (ref 40–53)
HCT VFR BLD AUTO: 23.3 % (ref 40–53)
HCT VFR BLD AUTO: 23.4 % (ref 40–53)
HCT VFR BLD AUTO: 23.4 % (ref 40–53)
HCT VFR BLD AUTO: 23.6 % (ref 40–53)
HCT VFR BLD AUTO: 23.6 % (ref 40–53)
HCT VFR BLD AUTO: 23.7 % (ref 40–53)
HCT VFR BLD AUTO: 23.7 % (ref 40–53)
HCT VFR BLD AUTO: 23.9 % (ref 40–53)
HCT VFR BLD AUTO: 24 % (ref 40–53)
HCT VFR BLD AUTO: 24.3 % (ref 40–53)
HCT VFR BLD AUTO: 24.7 % (ref 40–53)
HCT VFR BLD AUTO: 26.3 % (ref 40–53)
HCT VFR BLD AUTO: 26.4 % (ref 40–53)
HCT VFR BLD AUTO: 27.1 % (ref 40–53)
HCT VFR BLD AUTO: 27.5 % (ref 40–53)
HCT VFR BLD AUTO: 28 % (ref 40–53)
HCT VFR BLD AUTO: 28.5 % (ref 40–53)
HCT VFR BLD AUTO: 30.3 % (ref 40–53)
HCT VFR BLD AUTO: 31.4 % (ref 40–53)
HGB BLD-MCNC: 10 G/DL (ref 13.3–17.7)
HGB BLD-MCNC: 10.4 G/DL (ref 13.3–17.7)
HGB BLD-MCNC: 6.6 G/DL (ref 13.3–17.7)
HGB BLD-MCNC: 6.9 G/DL (ref 13.3–17.7)
HGB BLD-MCNC: 7 G/DL (ref 13.3–17.7)
HGB BLD-MCNC: 7 G/DL (ref 13.3–17.7)
HGB BLD-MCNC: 7.2 G/DL (ref 13.3–17.7)
HGB BLD-MCNC: 7.3 G/DL (ref 13.3–17.7)
HGB BLD-MCNC: 7.4 G/DL (ref 13.3–17.7)
HGB BLD-MCNC: 7.5 G/DL (ref 13.3–17.7)
HGB BLD-MCNC: 7.5 G/DL (ref 13.3–17.7)
HGB BLD-MCNC: 7.6 G/DL (ref 13.3–17.7)
HGB BLD-MCNC: 7.7 G/DL (ref 13.3–17.7)
HGB BLD-MCNC: 7.7 G/DL (ref 13.3–17.7)
HGB BLD-MCNC: 7.9 G/DL (ref 13.3–17.7)
HGB BLD-MCNC: 8 G/DL (ref 13.3–17.7)
HGB BLD-MCNC: 8.4 G/DL (ref 13.3–17.7)
HGB BLD-MCNC: 8.6 G/DL (ref 13.3–17.7)
HGB BLD-MCNC: 9.1 G/DL (ref 13.3–17.7)
HGB BLD-MCNC: 9.3 G/DL (ref 13.3–17.7)
HGB BLD-MCNC: 9.4 G/DL (ref 13.3–17.7)
HGB BLD-MCNC: 9.8 G/DL (ref 13.3–17.7)
HGB UR QL STRIP: NEGATIVE
INR PPP: 1.09 (ref 0.86–1.14)
INR PPP: 1.17 (ref 0.86–1.14)
INR PPP: 1.19 (ref 0.86–1.14)
INTERPRETATION ECG - MUSE: NORMAL
IRON SATN MFR SERPL: 49 % (ref 15–46)
IRON SATN MFR SERPL: 57 % (ref 15–46)
IRON SERPL-MCNC: 103 UG/DL (ref 35–180)
IRON SERPL-MCNC: 133 UG/DL (ref 35–180)
KETONES UR STRIP-MCNC: NEGATIVE MG/DL
L PNEUMO1 AG UR QL IA: NORMAL
LACTATE BLD-SCNC: 1.3 MMOL/L (ref 0.7–2)
LACTATE BLD-SCNC: 1.5 MMOL/L (ref 0.7–2)
LACTATE BLD-SCNC: 2.1 MMOL/L (ref 0.7–2)
LDH SERPL L TO P-CCNC: 180 U/L (ref 85–227)
LDH SERPL L TO P-CCNC: 211 U/L (ref 85–227)
LDH SERPL L TO P-CCNC: 221 U/L (ref 85–227)
LDH SERPL L TO P-CCNC: 250 U/L (ref 85–227)
LDH SERPL L TO P-CCNC: 256 U/L (ref 85–227)
LDH SERPL L TO P-CCNC: 280 U/L (ref 85–227)
LEUKOCYTE ESTERASE UR QL STRIP: NEGATIVE
LYMPHOCYTES # BLD AUTO: 0.3 10E9/L (ref 0.8–5.3)
LYMPHOCYTES # BLD AUTO: 0.4 10E9/L (ref 0.8–5.3)
LYMPHOCYTES # BLD AUTO: 0.5 10E9/L (ref 0.8–5.3)
LYMPHOCYTES # BLD AUTO: 0.6 10E9/L (ref 0.8–5.3)
LYMPHOCYTES # BLD AUTO: 0.7 10E9/L (ref 0.8–5.3)
LYMPHOCYTES # BLD AUTO: 0.8 10E9/L (ref 0.8–5.3)
LYMPHOCYTES NFR BLD AUTO: 11.3 %
LYMPHOCYTES NFR BLD AUTO: 12.5 %
LYMPHOCYTES NFR BLD AUTO: 18.3 %
LYMPHOCYTES NFR BLD AUTO: 25 %
LYMPHOCYTES NFR BLD AUTO: 27.4 %
LYMPHOCYTES NFR BLD AUTO: 28.7 %
LYMPHOCYTES NFR BLD AUTO: 30.7 %
LYMPHOCYTES NFR BLD AUTO: 31.3 %
LYMPHOCYTES NFR BLD AUTO: 31.6 %
LYMPHOCYTES NFR BLD AUTO: 33 %
LYMPHOCYTES NFR BLD AUTO: 34.9 %
LYMPHOCYTES NFR BLD AUTO: 36.5 %
LYMPHOCYTES NFR BLD AUTO: 38.8 %
LYMPHOCYTES NFR BLD AUTO: 39 %
LYMPHOCYTES NFR BLD AUTO: 39.3 %
LYMPHOCYTES NFR BLD AUTO: 42.7 %
LYMPHOCYTES NFR BLD AUTO: 43 %
LYMPHOCYTES NFR BLD AUTO: 43 %
LYMPHOCYTES NFR BLD AUTO: 43.9 %
LYMPHOCYTES NFR BLD AUTO: 46.2 %
LYMPHOCYTES NFR BLD AUTO: 46.5 %
LYMPHOCYTES NFR BLD AUTO: 47 %
LYMPHOCYTES NFR BLD AUTO: 49.6 %
LYMPHOCYTES NFR BLD AUTO: 52 %
LYMPHOCYTES NFR BLD AUTO: 52.6 %
LYMPHOCYTES NFR BLD AUTO: 54.9 %
LYMPHOCYTES NFR BLD AUTO: 55.7 %
LYMPHOCYTES NFR BLD AUTO: 59.6 %
Lab: NORMAL
MACROCYTES BLD QL SMEAR: PRESENT
MAGNESIUM SERPL-MCNC: 1.9 MG/DL (ref 1.6–2.3)
MCH RBC QN AUTO: 28.6 PG (ref 26.5–33)
MCH RBC QN AUTO: 28.7 PG (ref 26.5–33)
MCH RBC QN AUTO: 28.8 PG (ref 26.5–33)
MCH RBC QN AUTO: 28.9 PG (ref 26.5–33)
MCH RBC QN AUTO: 28.9 PG (ref 26.5–33)
MCH RBC QN AUTO: 29 PG (ref 26.5–33)
MCH RBC QN AUTO: 29 PG (ref 26.5–33)
MCH RBC QN AUTO: 29.1 PG (ref 26.5–33)
MCH RBC QN AUTO: 29.2 PG (ref 26.5–33)
MCH RBC QN AUTO: 29.2 PG (ref 26.5–33)
MCH RBC QN AUTO: 29.4 PG (ref 26.5–33)
MCH RBC QN AUTO: 29.4 PG (ref 26.5–33)
MCH RBC QN AUTO: 29.5 PG (ref 26.5–33)
MCH RBC QN AUTO: 29.6 PG (ref 26.5–33)
MCH RBC QN AUTO: 29.6 PG (ref 26.5–33)
MCH RBC QN AUTO: 29.7 PG (ref 26.5–33)
MCH RBC QN AUTO: 29.8 PG (ref 26.5–33)
MCH RBC QN AUTO: 29.8 PG (ref 26.5–33)
MCH RBC QN AUTO: 30.1 PG (ref 26.5–33)
MCH RBC QN AUTO: 30.1 PG (ref 26.5–33)
MCH RBC QN AUTO: 30.2 PG (ref 26.5–33)
MCH RBC QN AUTO: 30.3 PG (ref 26.5–33)
MCH RBC QN AUTO: 30.9 PG (ref 26.5–33)
MCH RBC QN AUTO: 31 PG (ref 26.5–33)
MCH RBC QN AUTO: 31.8 PG (ref 26.5–33)
MCH RBC QN AUTO: 31.9 PG (ref 26.5–33)
MCH RBC QN AUTO: 32.1 PG (ref 26.5–33)
MCHC RBC AUTO-ENTMCNC: 31.6 G/DL (ref 31.5–36.5)
MCHC RBC AUTO-ENTMCNC: 31.6 G/DL (ref 31.5–36.5)
MCHC RBC AUTO-ENTMCNC: 31.8 G/DL (ref 31.5–36.5)
MCHC RBC AUTO-ENTMCNC: 32 G/DL (ref 31.5–36.5)
MCHC RBC AUTO-ENTMCNC: 32.1 G/DL (ref 31.5–36.5)
MCHC RBC AUTO-ENTMCNC: 32.1 G/DL (ref 31.5–36.5)
MCHC RBC AUTO-ENTMCNC: 32.2 G/DL (ref 31.5–36.5)
MCHC RBC AUTO-ENTMCNC: 32.4 G/DL (ref 31.5–36.5)
MCHC RBC AUTO-ENTMCNC: 32.5 G/DL (ref 31.5–36.5)
MCHC RBC AUTO-ENTMCNC: 32.6 G/DL (ref 31.5–36.5)
MCHC RBC AUTO-ENTMCNC: 32.7 G/DL (ref 31.5–36.5)
MCHC RBC AUTO-ENTMCNC: 32.9 G/DL (ref 31.5–36.5)
MCHC RBC AUTO-ENTMCNC: 33 G/DL (ref 31.5–36.5)
MCHC RBC AUTO-ENTMCNC: 33 G/DL (ref 31.5–36.5)
MCHC RBC AUTO-ENTMCNC: 33.1 G/DL (ref 31.5–36.5)
MCHC RBC AUTO-ENTMCNC: 33.1 G/DL (ref 31.5–36.5)
MCHC RBC AUTO-ENTMCNC: 33.2 G/DL (ref 31.5–36.5)
MCHC RBC AUTO-ENTMCNC: 33.2 G/DL (ref 31.5–36.5)
MCHC RBC AUTO-ENTMCNC: 33.3 G/DL (ref 31.5–36.5)
MCHC RBC AUTO-ENTMCNC: 33.6 G/DL (ref 31.5–36.5)
MCHC RBC AUTO-ENTMCNC: 33.6 G/DL (ref 31.5–36.5)
MCHC RBC AUTO-ENTMCNC: 33.8 G/DL (ref 31.5–36.5)
MCHC RBC AUTO-ENTMCNC: 33.9 G/DL (ref 31.5–36.5)
MCHC RBC AUTO-ENTMCNC: 35 G/DL (ref 31.5–36.5)
MCV RBC AUTO: 101 FL (ref 78–100)
MCV RBC AUTO: 86 FL (ref 78–100)
MCV RBC AUTO: 87 FL (ref 78–100)
MCV RBC AUTO: 87 FL (ref 78–100)
MCV RBC AUTO: 88 FL (ref 78–100)
MCV RBC AUTO: 89 FL (ref 78–100)
MCV RBC AUTO: 90 FL (ref 78–100)
MCV RBC AUTO: 91 FL (ref 78–100)
MCV RBC AUTO: 91 FL (ref 78–100)
MCV RBC AUTO: 92 FL (ref 78–100)
MCV RBC AUTO: 94 FL (ref 78–100)
MCV RBC AUTO: 96 FL (ref 78–100)
MCV RBC AUTO: 98 FL (ref 78–100)
MCV RBC AUTO: 99 FL (ref 78–100)
METAMYELOCYTES # BLD: 0 10E9/L
METAMYELOCYTES # BLD: 0.1 10E9/L
METAMYELOCYTES NFR BLD MANUAL: 0.9 %
METAMYELOCYTES NFR BLD MANUAL: 1.1 %
METAMYELOCYTES NFR BLD MANUAL: 1.7 %
METAMYELOCYTES NFR BLD MANUAL: 1.7 %
METAMYELOCYTES NFR BLD MANUAL: 1.8 %
METAMYELOCYTES NFR BLD MANUAL: 2 %
METAMYELOCYTES NFR BLD MANUAL: 2.6 %
METAMYELOCYTES NFR BLD MANUAL: 2.6 %
METAMYELOCYTES NFR BLD MANUAL: 2.9 %
METAMYELOCYTES NFR BLD MANUAL: 3.5 %
METAMYELOCYTES NFR BLD MANUAL: 3.5 %
METAMYELOCYTES NFR BLD MANUAL: 4 %
METAMYELOCYTES NFR BLD MANUAL: 5 %
METAMYELOCYTES NFR BLD MANUAL: 5.1 %
MICROCYTES BLD QL SMEAR: PRESENT
MONOCYTES # BLD AUTO: 0 10E9/L (ref 0–1.3)
MONOCYTES # BLD AUTO: 0.1 10E9/L (ref 0–1.3)
MONOCYTES # BLD AUTO: 0.2 10E9/L (ref 0–1.3)
MONOCYTES NFR BLD AUTO: 0 %
MONOCYTES NFR BLD AUTO: 0.9 %
MONOCYTES NFR BLD AUTO: 0.9 %
MONOCYTES NFR BLD AUTO: 1.7 %
MONOCYTES NFR BLD AUTO: 1.8 %
MONOCYTES NFR BLD AUTO: 14 %
MONOCYTES NFR BLD AUTO: 2 %
MONOCYTES NFR BLD AUTO: 2.1 %
MONOCYTES NFR BLD AUTO: 2.1 %
MONOCYTES NFR BLD AUTO: 2.6 %
MONOCYTES NFR BLD AUTO: 3 %
MONOCYTES NFR BLD AUTO: 3.4 %
MONOCYTES NFR BLD AUTO: 3.4 %
MONOCYTES NFR BLD AUTO: 3.5 %
MONOCYTES NFR BLD AUTO: 4 %
MONOCYTES NFR BLD AUTO: 4.4 %
MONOCYTES NFR BLD AUTO: 4.8 %
MONOCYTES NFR BLD AUTO: 8 %
MYELOCYTES # BLD: 0 10E9/L
MYELOCYTES # BLD: 0.1 10E9/L
MYELOCYTES # BLD: 0.2 10E9/L
MYELOCYTES NFR BLD MANUAL: 0.9 %
MYELOCYTES NFR BLD MANUAL: 1 %
MYELOCYTES NFR BLD MANUAL: 1 %
MYELOCYTES NFR BLD MANUAL: 1.6 %
MYELOCYTES NFR BLD MANUAL: 2 %
MYELOCYTES NFR BLD MANUAL: 2.6 %
MYELOCYTES NFR BLD MANUAL: 2.6 %
MYELOCYTES NFR BLD MANUAL: 3.4 %
MYELOCYTES NFR BLD MANUAL: 3.5 %
MYELOCYTES NFR BLD MANUAL: 4.6 %
MYELOCYTES NFR BLD MANUAL: 5 %
MYELOCYTES NFR BLD MANUAL: 7 %
MYELOCYTES NFR BLD MANUAL: 8.7 %
NEUTROPHILS # BLD AUTO: 0.2 10E9/L (ref 1.6–8.3)
NEUTROPHILS # BLD AUTO: 0.3 10E9/L (ref 1.6–8.3)
NEUTROPHILS # BLD AUTO: 0.3 10E9/L (ref 1.6–8.3)
NEUTROPHILS # BLD AUTO: 0.4 10E9/L (ref 1.6–8.3)
NEUTROPHILS # BLD AUTO: 0.5 10E9/L (ref 1.6–8.3)
NEUTROPHILS # BLD AUTO: 0.6 10E9/L (ref 1.6–8.3)
NEUTROPHILS # BLD AUTO: 0.7 10E9/L (ref 1.6–8.3)
NEUTROPHILS # BLD AUTO: 0.7 10E9/L (ref 1.6–8.3)
NEUTROPHILS # BLD AUTO: 0.8 10E9/L (ref 1.6–8.3)
NEUTROPHILS # BLD AUTO: 0.9 10E9/L (ref 1.6–8.3)
NEUTROPHILS # BLD AUTO: 1 10E9/L (ref 1.6–8.3)
NEUTROPHILS # BLD AUTO: 1.2 10E9/L (ref 1.6–8.3)
NEUTROPHILS # BLD AUTO: 1.3 10E9/L (ref 1.6–8.3)
NEUTROPHILS # BLD AUTO: 1.6 10E9/L (ref 1.6–8.3)
NEUTROPHILS # BLD AUTO: 2.4 10E9/L (ref 1.6–8.3)
NEUTROPHILS # BLD AUTO: 2.8 10E9/L (ref 1.6–8.3)
NEUTROPHILS NFR BLD AUTO: 26.9 %
NEUTROPHILS NFR BLD AUTO: 27.6 %
NEUTROPHILS NFR BLD AUTO: 28.2 %
NEUTROPHILS NFR BLD AUTO: 29 %
NEUTROPHILS NFR BLD AUTO: 32.7 %
NEUTROPHILS NFR BLD AUTO: 33 %
NEUTROPHILS NFR BLD AUTO: 39.8 %
NEUTROPHILS NFR BLD AUTO: 40 %
NEUTROPHILS NFR BLD AUTO: 40.3 %
NEUTROPHILS NFR BLD AUTO: 41.4 %
NEUTROPHILS NFR BLD AUTO: 43 %
NEUTROPHILS NFR BLD AUTO: 47.4 %
NEUTROPHILS NFR BLD AUTO: 47.8 %
NEUTROPHILS NFR BLD AUTO: 48 %
NEUTROPHILS NFR BLD AUTO: 49 %
NEUTROPHILS NFR BLD AUTO: 50.8 %
NEUTROPHILS NFR BLD AUTO: 52 %
NEUTROPHILS NFR BLD AUTO: 55.2 %
NEUTROPHILS NFR BLD AUTO: 55.3 %
NEUTROPHILS NFR BLD AUTO: 56.6 %
NEUTROPHILS NFR BLD AUTO: 58 %
NEUTROPHILS NFR BLD AUTO: 59.8 %
NEUTROPHILS NFR BLD AUTO: 60.9 %
NEUTROPHILS NFR BLD AUTO: 63.2 %
NEUTROPHILS NFR BLD AUTO: 67.2 %
NEUTROPHILS NFR BLD AUTO: 78.2 %
NEUTROPHILS NFR BLD AUTO: 81.2 %
NEUTROPHILS NFR BLD AUTO: 83.5 %
NITRATE UR QL: NEGATIVE
NRBC # BLD AUTO: 0 10*3/UL
NRBC # BLD AUTO: 0.1 10*3/UL
NRBC BLD AUTO-RTO: 1 /100
NRBC BLD AUTO-RTO: 4 /100
NUM BPU REQUESTED: 1
NUM BPU REQUESTED: 2
OVALOCYTES BLD QL SMEAR: ABNORMAL
OVALOCYTES BLD QL SMEAR: SLIGHT
PH UR STRIP: 5.5 PH (ref 5–7)
PHOSPHATE SERPL-MCNC: 3.7 MG/DL (ref 2.5–4.5)
PLATELET # BLD AUTO: 102 10E9/L (ref 150–450)
PLATELET # BLD AUTO: 13 10E9/L (ref 150–450)
PLATELET # BLD AUTO: 134 10E9/L (ref 150–450)
PLATELET # BLD AUTO: 207 10E9/L (ref 150–450)
PLATELET # BLD AUTO: 250 10E9/L (ref 150–450)
PLATELET # BLD AUTO: 26 10E9/L (ref 150–450)
PLATELET # BLD AUTO: 29 10E9/L (ref 150–450)
PLATELET # BLD AUTO: 29 10E9/L (ref 150–450)
PLATELET # BLD AUTO: 30 10E9/L (ref 150–450)
PLATELET # BLD AUTO: 32 10E9/L (ref 150–450)
PLATELET # BLD AUTO: 33 10E9/L (ref 150–450)
PLATELET # BLD AUTO: 341 10E9/L (ref 150–450)
PLATELET # BLD AUTO: 38 10E9/L (ref 150–450)
PLATELET # BLD AUTO: 39 10E9/L (ref 150–450)
PLATELET # BLD AUTO: 44 10E9/L (ref 150–450)
PLATELET # BLD AUTO: 49 10E9/L (ref 150–450)
PLATELET # BLD AUTO: 51 10E9/L (ref 150–450)
PLATELET # BLD AUTO: 55 10E9/L (ref 150–450)
PLATELET # BLD AUTO: 59 10E9/L (ref 150–450)
PLATELET # BLD AUTO: 60 10E9/L (ref 150–450)
PLATELET # BLD AUTO: 61 10E9/L (ref 150–450)
PLATELET # BLD AUTO: 63 10E9/L (ref 150–450)
PLATELET # BLD AUTO: 64 10E9/L (ref 150–450)
PLATELET # BLD AUTO: 65 10E9/L (ref 150–450)
PLATELET # BLD AUTO: 65 10E9/L (ref 150–450)
PLATELET # BLD AUTO: 75 10E9/L (ref 150–450)
PLATELET # BLD AUTO: 82 10E9/L (ref 150–450)
PLATELET # BLD AUTO: 91 10E9/L (ref 150–450)
PLATELET # BLD AUTO: 97 10E9/L (ref 150–450)
PLATELET # BLD EST: ABNORMAL 10*3/UL
POIKILOCYTOSIS BLD QL SMEAR: ABNORMAL
POIKILOCYTOSIS BLD QL SMEAR: SLIGHT
POLYCHROMASIA BLD QL SMEAR: ABNORMAL
POTASSIUM SERPL-SCNC: 4 MMOL/L (ref 3.4–5.3)
POTASSIUM SERPL-SCNC: 4.1 MMOL/L (ref 3.4–5.3)
POTASSIUM SERPL-SCNC: 4.1 MMOL/L (ref 3.4–5.3)
POTASSIUM SERPL-SCNC: 4.2 MMOL/L (ref 3.4–5.3)
POTASSIUM SERPL-SCNC: 4.3 MMOL/L (ref 3.4–5.3)
POTASSIUM SERPL-SCNC: 4.4 MMOL/L (ref 3.4–5.3)
POTASSIUM SERPL-SCNC: 4.5 MMOL/L (ref 3.4–5.3)
POTASSIUM SERPL-SCNC: 4.7 MMOL/L (ref 3.4–5.3)
PROT SERPL-MCNC: 6.6 G/DL (ref 6.8–8.8)
PROT SERPL-MCNC: 6.8 G/DL (ref 6.8–8.8)
PROT SERPL-MCNC: 6.9 G/DL (ref 6.8–8.8)
PROT SERPL-MCNC: 7 G/DL (ref 6.8–8.8)
PROT SERPL-MCNC: 7 G/DL (ref 6.8–8.8)
PROT SERPL-MCNC: 7.1 G/DL (ref 6.8–8.8)
PROT SERPL-MCNC: 7.2 G/DL (ref 6.8–8.8)
PROT SERPL-MCNC: 7.4 G/DL (ref 6.8–8.8)
RBC # BLD AUTO: 2.07 10E12/L (ref 4.4–5.9)
RBC # BLD AUTO: 2.29 10E12/L (ref 4.4–5.9)
RBC # BLD AUTO: 2.33 10E12/L (ref 4.4–5.9)
RBC # BLD AUTO: 2.39 10E12/L (ref 4.4–5.9)
RBC # BLD AUTO: 2.4 10E12/L (ref 4.4–5.9)
RBC # BLD AUTO: 2.42 10E12/L (ref 4.4–5.9)
RBC # BLD AUTO: 2.44 10E12/L (ref 4.4–5.9)
RBC # BLD AUTO: 2.45 10E12/L (ref 4.4–5.9)
RBC # BLD AUTO: 2.5 10E12/L (ref 4.4–5.9)
RBC # BLD AUTO: 2.55 10E12/L (ref 4.4–5.9)
RBC # BLD AUTO: 2.56 10E12/L (ref 4.4–5.9)
RBC # BLD AUTO: 2.59 10E12/L (ref 4.4–5.9)
RBC # BLD AUTO: 2.61 10E12/L (ref 4.4–5.9)
RBC # BLD AUTO: 2.64 10E12/L (ref 4.4–5.9)
RBC # BLD AUTO: 2.64 10E12/L (ref 4.4–5.9)
RBC # BLD AUTO: 2.66 10E12/L (ref 4.4–5.9)
RBC # BLD AUTO: 2.67 10E12/L (ref 4.4–5.9)
RBC # BLD AUTO: 2.68 10E12/L (ref 4.4–5.9)
RBC # BLD AUTO: 2.7 10E12/L (ref 4.4–5.9)
RBC # BLD AUTO: 2.71 10E12/L (ref 4.4–5.9)
RBC # BLD AUTO: 2.72 10E12/L (ref 4.4–5.9)
RBC # BLD AUTO: 2.86 10E12/L (ref 4.4–5.9)
RBC # BLD AUTO: 2.92 10E12/L (ref 4.4–5.9)
RBC # BLD AUTO: 3.09 10E12/L (ref 4.4–5.9)
RBC # BLD AUTO: 3.1 10E12/L (ref 4.4–5.9)
RBC # BLD AUTO: 3.12 10E12/L (ref 4.4–5.9)
RBC # BLD AUTO: 3.24 10E12/L (ref 4.4–5.9)
RBC # BLD AUTO: 3.4 10E12/L (ref 4.4–5.9)
RBC # BLD AUTO: 3.5 10E12/L (ref 4.4–5.9)
RBC INCLUSIONS BLD: SLIGHT
RBC MORPH BLD: NORMAL
RETICS # AUTO: 53.1 10E9/L (ref 25–95)
RETICS # AUTO: 8.7 10E9/L (ref 25–95)
RETICS # AUTO: 9.7 10E9/L (ref 25–95)
RETICS/RBC NFR AUTO: 0.3 % (ref 0.5–2)
RETICS/RBC NFR AUTO: 0.3 % (ref 0.5–2)
RETICS/RBC NFR AUTO: 2.2 % (ref 0.5–2)
S PNEUM AG SPEC QL: NORMAL
SARS-COV-2 PCR COMMENT: NORMAL
SARS-COV-2 RNA SPEC QL NAA+PROBE: NEGATIVE
SARS-COV-2 RNA SPEC QL NAA+PROBE: NORMAL
SARS-COV-2 RNA SPEC QL NAA+PROBE: NOT DETECTED
SODIUM SERPL-SCNC: 136 MMOL/L (ref 133–144)
SODIUM SERPL-SCNC: 137 MMOL/L (ref 133–144)
SODIUM SERPL-SCNC: 138 MMOL/L (ref 133–144)
SODIUM SERPL-SCNC: 138 MMOL/L (ref 133–144)
SODIUM SERPL-SCNC: 139 MMOL/L (ref 133–144)
SODIUM SERPL-SCNC: 140 MMOL/L (ref 133–144)
SODIUM SERPL-SCNC: 141 MMOL/L (ref 133–144)
SODIUM SERPL-SCNC: 142 MMOL/L (ref 133–144)
SODIUM SERPL-SCNC: 142 MMOL/L (ref 133–144)
SODIUM SERPL-SCNC: 143 MMOL/L (ref 133–144)
SOURCE: NORMAL
SP GR UR STRIP: 1.02 (ref 1–1.03)
SPECIMEN EXP DATE BLD: NORMAL
SPECIMEN SOURCE: ABNORMAL
SPECIMEN SOURCE: NORMAL
TIBC SERPL-MCNC: 182 UG/DL (ref 240–430)
TIBC SERPL-MCNC: 274 UG/DL (ref 240–430)
TRANSF REACT PLASRBC-IMP: NORMAL
TRANSF REACT PLASRBC-IMP: NORMAL
TRANSFUSION RXN BLOOD BANK NOTIFICATION: NORMAL
TRANSFUSION STATUS PATIENT QL: NORMAL
TROPONIN I SERPL-MCNC: <0.015 UG/L (ref 0–0.04)
TSH SERPL DL<=0.005 MIU/L-ACNC: 3.62 MU/L (ref 0.4–4)
URATE SERPL-MCNC: 5 MG/DL (ref 3.5–7.2)
URATE SERPL-MCNC: 5.7 MG/DL (ref 3.5–7.2)
UROBILINOGEN UR STRIP-MCNC: NORMAL MG/DL (ref 0–2)
VANCOMYCIN SERPL-MCNC: 17.1 MG/L
VIT B12 SERPL-MCNC: 680 PG/ML (ref 193–986)
WBC # BLD AUTO: 0.9 10E9/L (ref 4–11)
WBC # BLD AUTO: 0.9 10E9/L (ref 4–11)
WBC # BLD AUTO: 1 10E9/L (ref 4–11)
WBC # BLD AUTO: 1.1 10E9/L (ref 4–11)
WBC # BLD AUTO: 1.2 10E9/L (ref 4–11)
WBC # BLD AUTO: 1.3 10E9/L (ref 4–11)
WBC # BLD AUTO: 1.4 10E9/L (ref 4–11)
WBC # BLD AUTO: 1.5 10E9/L (ref 4–11)
WBC # BLD AUTO: 1.6 10E9/L (ref 4–11)
WBC # BLD AUTO: 1.6 10E9/L (ref 4–11)
WBC # BLD AUTO: 1.7 10E9/L (ref 4–11)
WBC # BLD AUTO: 1.8 10E9/L (ref 4–11)
WBC # BLD AUTO: 2.2 10E9/L (ref 4–11)
WBC # BLD AUTO: 2.4 10E9/L (ref 4–11)
WBC # BLD AUTO: 2.9 10E9/L (ref 4–11)
WBC # BLD AUTO: 3.3 10E9/L (ref 4–11)

## 2020-01-01 PROCEDURE — 86900 BLOOD TYPING SEROLOGIC ABO: CPT | Performed by: PHYSICIAN ASSISTANT

## 2020-01-01 PROCEDURE — 40000141 ZZH STATISTIC PERIPHERAL IV START W/O US GUIDANCE: Mod: ZF

## 2020-01-01 PROCEDURE — 88161 CYTOPATH SMEAR OTHER SOURCE: CPT | Performed by: PHYSICIAN ASSISTANT

## 2020-01-01 PROCEDURE — 80048 BASIC METABOLIC PNL TOTAL CA: CPT | Performed by: PHYSICIAN ASSISTANT

## 2020-01-01 PROCEDURE — 85025 COMPLETE CBC W/AUTO DIFF WBC: CPT | Performed by: INTERNAL MEDICINE

## 2020-01-01 PROCEDURE — 96401 CHEMO ANTI-NEOPL SQ/IM: CPT

## 2020-01-01 PROCEDURE — 86850 RBC ANTIBODY SCREEN: CPT | Performed by: INTERNAL MEDICINE

## 2020-01-01 PROCEDURE — 99495 TRANSJ CARE MGMT MOD F2F 14D: CPT | Mod: 95 | Performed by: NURSE PRACTITIONER

## 2020-01-01 PROCEDURE — 83540 ASSAY OF IRON: CPT | Performed by: INTERNAL MEDICINE

## 2020-01-01 PROCEDURE — 25000132 ZZH RX MED GY IP 250 OP 250 PS 637: Performed by: INTERNAL MEDICINE

## 2020-01-01 PROCEDURE — 92526 ORAL FUNCTION THERAPY: CPT | Mod: GN

## 2020-01-01 PROCEDURE — 82607 VITAMIN B-12: CPT | Performed by: INTERNAL MEDICINE

## 2020-01-01 PROCEDURE — 85610 PROTHROMBIN TIME: CPT | Performed by: INTERNAL MEDICINE

## 2020-01-01 PROCEDURE — 86900 BLOOD TYPING SEROLOGIC ABO: CPT | Performed by: INTERNAL MEDICINE

## 2020-01-01 PROCEDURE — 96375 TX/PRO/DX INJ NEW DRUG ADDON: CPT

## 2020-01-01 PROCEDURE — 85045 AUTOMATED RETICULOCYTE COUNT: CPT | Performed by: INTERNAL MEDICINE

## 2020-01-01 PROCEDURE — G0463 HOSPITAL OUTPT CLINIC VISIT: HCPCS | Mod: ZF

## 2020-01-01 PROCEDURE — 96365 THER/PROPH/DIAG IV INF INIT: CPT

## 2020-01-01 PROCEDURE — 36415 COLL VENOUS BLD VENIPUNCTURE: CPT | Performed by: PHYSICIAN ASSISTANT

## 2020-01-01 PROCEDURE — 36415 COLL VENOUS BLD VENIPUNCTURE: CPT

## 2020-01-01 PROCEDURE — 25000132 ZZH RX MED GY IP 250 OP 250 PS 637: Mod: ZF | Performed by: INTERNAL MEDICINE

## 2020-01-01 PROCEDURE — 40000893 ZZH STATISTIC PT IP EVAL DEFER: Performed by: PHYSICAL THERAPIST

## 2020-01-01 PROCEDURE — P9016 RBC LEUKOCYTES REDUCED: HCPCS | Performed by: INTERNAL MEDICINE

## 2020-01-01 PROCEDURE — 83615 LACTATE (LD) (LDH) ENZYME: CPT | Performed by: INTERNAL MEDICINE

## 2020-01-01 PROCEDURE — 85025 COMPLETE CBC W/AUTO DIFF WBC: CPT | Performed by: EMERGENCY MEDICINE

## 2020-01-01 PROCEDURE — 12000001 ZZH R&B MED SURG/OB UMMC

## 2020-01-01 PROCEDURE — 80048 BASIC METABOLIC PNL TOTAL CA: CPT | Performed by: INTERNAL MEDICINE

## 2020-01-01 PROCEDURE — 99215 OFFICE O/P EST HI 40 MIN: CPT | Mod: ZP | Performed by: INTERNAL MEDICINE

## 2020-01-01 PROCEDURE — 93005 ELECTROCARDIOGRAM TRACING: CPT | Performed by: EMERGENCY MEDICINE

## 2020-01-01 PROCEDURE — 99215 OFFICE O/P EST HI 40 MIN: CPT | Mod: GC | Performed by: INTERNAL MEDICINE

## 2020-01-01 PROCEDURE — 40000556 ZZH STATISTIC PERIPHERAL IV START W US GUIDANCE: Mod: ZF

## 2020-01-01 PROCEDURE — 25000128 H RX IP 250 OP 636: Performed by: STUDENT IN AN ORGANIZED HEALTH CARE EDUCATION/TRAINING PROGRAM

## 2020-01-01 PROCEDURE — 96413 CHEMO IV INFUSION 1 HR: CPT

## 2020-01-01 PROCEDURE — 86923 COMPATIBILITY TEST ELECTRIC: CPT | Performed by: PHYSICIAN ASSISTANT

## 2020-01-01 PROCEDURE — 25000132 ZZH RX MED GY IP 250 OP 250 PS 637: Performed by: PHYSICIAN ASSISTANT

## 2020-01-01 PROCEDURE — 40000611 ZZHCL STATISTIC MORPHOLOGY W/INTERP HEMEPATH TC 85060: Performed by: INTERNAL MEDICINE

## 2020-01-01 PROCEDURE — 80053 COMPREHEN METABOLIC PANEL: CPT | Performed by: INTERNAL MEDICINE

## 2020-01-01 PROCEDURE — 86923 COMPATIBILITY TEST ELECTRIC: CPT | Performed by: INTERNAL MEDICINE

## 2020-01-01 PROCEDURE — 99214 OFFICE O/P EST MOD 30 MIN: CPT | Mod: ZP | Performed by: PHYSICIAN ASSISTANT

## 2020-01-01 PROCEDURE — 25000125 ZZHC RX 250: Mod: ZF | Performed by: INTERNAL MEDICINE

## 2020-01-01 PROCEDURE — 36430 TRANSFUSION BLD/BLD COMPNT: CPT

## 2020-01-01 PROCEDURE — 25000128 H RX IP 250 OP 636: Performed by: INTERNAL MEDICINE

## 2020-01-01 PROCEDURE — 85730 THROMBOPLASTIN TIME PARTIAL: CPT | Performed by: PHYSICIAN ASSISTANT

## 2020-01-01 PROCEDURE — 25000128 H RX IP 250 OP 636: Mod: JW,ZF | Performed by: INTERNAL MEDICINE

## 2020-01-01 PROCEDURE — 25800030 ZZH RX IP 258 OP 636: Mod: ZF | Performed by: INTERNAL MEDICINE

## 2020-01-01 PROCEDURE — 40000341 ZZHCL STATISTIC BB TRANSF RXN INVEST: Performed by: PATHOLOGY

## 2020-01-01 PROCEDURE — 88275 CYTOGENETICS 100-300: CPT | Performed by: INTERNAL MEDICINE

## 2020-01-01 PROCEDURE — 40000556 ZZH STATISTIC PERIPHERAL IV START W US GUIDANCE

## 2020-01-01 PROCEDURE — 88305 TISSUE EXAM BY PATHOLOGIST: CPT | Performed by: PHYSICIAN ASSISTANT

## 2020-01-01 PROCEDURE — 86901 BLOOD TYPING SEROLOGIC RH(D): CPT | Performed by: INTERNAL MEDICINE

## 2020-01-01 PROCEDURE — 71045 X-RAY EXAM CHEST 1 VIEW: CPT

## 2020-01-01 PROCEDURE — 92611 MOTION FLUOROSCOPY/SWALLOW: CPT | Mod: GN

## 2020-01-01 PROCEDURE — 25800030 ZZH RX IP 258 OP 636: Performed by: STUDENT IN AN ORGANIZED HEALTH CARE EDUCATION/TRAINING PROGRAM

## 2020-01-01 PROCEDURE — 25000128 H RX IP 250 OP 636: Mod: ZF | Performed by: INTERNAL MEDICINE

## 2020-01-01 PROCEDURE — 99214 OFFICE O/P EST MOD 30 MIN: CPT | Mod: ZP | Performed by: INTERNAL MEDICINE

## 2020-01-01 PROCEDURE — 96365 THER/PROPH/DIAG IV INF INIT: CPT | Performed by: EMERGENCY MEDICINE

## 2020-01-01 PROCEDURE — 99285 EMERGENCY DEPT VISIT HI MDM: CPT | Mod: Z6 | Performed by: EMERGENCY MEDICINE

## 2020-01-01 PROCEDURE — 82728 ASSAY OF FERRITIN: CPT | Performed by: INTERNAL MEDICINE

## 2020-01-01 PROCEDURE — U0003 INFECTIOUS AGENT DETECTION BY NUCLEIC ACID (DNA OR RNA); SEVERE ACUTE RESPIRATORY SYNDROME CORONAVIRUS 2 (SARS-COV-2) (CORONAVIRUS DISEASE [COVID-19]), AMPLIFIED PROBE TECHNIQUE, MAKING USE OF HIGH THROUGHPUT TECHNOLOGIES AS DESCRIBED BY CMS-2020-01-R: HCPCS | Mod: 90 | Performed by: PHYSICIAN ASSISTANT

## 2020-01-01 PROCEDURE — 87899 AGENT NOS ASSAY W/OPTIC: CPT | Performed by: INTERNAL MEDICINE

## 2020-01-01 PROCEDURE — 80202 ASSAY OF VANCOMYCIN: CPT | Performed by: INTERNAL MEDICINE

## 2020-01-01 PROCEDURE — 88184 FLOWCYTOMETRY/ TC 1 MARKER: CPT | Performed by: INTERNAL MEDICINE

## 2020-01-01 PROCEDURE — 96374 THER/PROPH/DIAG INJ IV PUSH: CPT

## 2020-01-01 PROCEDURE — 36415 COLL VENOUS BLD VENIPUNCTURE: CPT | Performed by: INTERNAL MEDICINE

## 2020-01-01 PROCEDURE — 83550 IRON BINDING TEST: CPT | Performed by: INTERNAL MEDICINE

## 2020-01-01 PROCEDURE — 87077 CULTURE AEROBIC IDENTIFY: CPT | Performed by: INTERNAL MEDICINE

## 2020-01-01 PROCEDURE — 40000341 ZZHCL STATISTIC BB TRANSF RXN INVEST: Performed by: INTERNAL MEDICINE

## 2020-01-01 PROCEDURE — 84443 ASSAY THYROID STIM HORMONE: CPT | Performed by: INTERNAL MEDICINE

## 2020-01-01 PROCEDURE — 85025 COMPLETE CBC W/AUTO DIFF WBC: CPT | Performed by: PHYSICIAN ASSISTANT

## 2020-01-01 PROCEDURE — U0003 INFECTIOUS AGENT DETECTION BY NUCLEIC ACID (DNA OR RNA); SEVERE ACUTE RESPIRATORY SYNDROME CORONAVIRUS 2 (SARS-COV-2) (CORONAVIRUS DISEASE [COVID-19]), AMPLIFIED PROBE TECHNIQUE, MAKING USE OF HIGH THROUGHPUT TECHNOLOGIES AS DESCRIBED BY CMS-2020-01-R: HCPCS | Performed by: EMERGENCY MEDICINE

## 2020-01-01 PROCEDURE — 25800030 ZZH RX IP 258 OP 636: Mod: ZF | Performed by: PHYSICIAN ASSISTANT

## 2020-01-01 PROCEDURE — G0463 HOSPITAL OUTPT CLINIC VISIT: HCPCS

## 2020-01-01 PROCEDURE — 96367 TX/PROPH/DG ADDL SEQ IV INF: CPT

## 2020-01-01 PROCEDURE — 36592 COLLECT BLOOD FROM PICC: CPT

## 2020-01-01 PROCEDURE — 96409 CHEMO IV PUSH SNGL DRUG: CPT

## 2020-01-01 PROCEDURE — 25000128 H RX IP 250 OP 636: Mod: JW,ZF | Performed by: PHYSICIAN ASSISTANT

## 2020-01-01 PROCEDURE — 96367 TX/PROPH/DG ADDL SEQ IV INF: CPT | Performed by: EMERGENCY MEDICINE

## 2020-01-01 PROCEDURE — 99204 OFFICE O/P NEW MOD 45 MIN: CPT | Mod: GC | Performed by: INTERNAL MEDICINE

## 2020-01-01 PROCEDURE — 99285 EMERGENCY DEPT VISIT HI MDM: CPT | Mod: 25 | Performed by: EMERGENCY MEDICINE

## 2020-01-01 PROCEDURE — 25000125 ZZHC RX 250: Performed by: PHYSICIAN ASSISTANT

## 2020-01-01 PROCEDURE — 85384 FIBRINOGEN ACTIVITY: CPT | Performed by: PHYSICIAN ASSISTANT

## 2020-01-01 PROCEDURE — 25000132 ZZH RX MED GY IP 250 OP 250 PS 637: Performed by: EMERGENCY MEDICINE

## 2020-01-01 PROCEDURE — 25000128 H RX IP 250 OP 636: Performed by: EMERGENCY MEDICINE

## 2020-01-01 PROCEDURE — 81003 URINALYSIS AUTO W/O SCOPE: CPT | Performed by: EMERGENCY MEDICINE

## 2020-01-01 PROCEDURE — 87040 BLOOD CULTURE FOR BACTERIA: CPT | Performed by: PHYSICIAN ASSISTANT

## 2020-01-01 PROCEDURE — 99000 SPECIMEN HANDLING OFFICE-LAB: CPT | Performed by: PHYSICIAN ASSISTANT

## 2020-01-01 PROCEDURE — 83735 ASSAY OF MAGNESIUM: CPT | Performed by: INTERNAL MEDICINE

## 2020-01-01 PROCEDURE — 87040 BLOOD CULTURE FOR BACTERIA: CPT | Performed by: EMERGENCY MEDICINE

## 2020-01-01 PROCEDURE — 88185 FLOWCYTOMETRY/TC ADD-ON: CPT | Performed by: INTERNAL MEDICINE

## 2020-01-01 PROCEDURE — 88264 CHROMOSOME ANALYSIS 20-25: CPT | Performed by: INTERNAL MEDICINE

## 2020-01-01 PROCEDURE — 84100 ASSAY OF PHOSPHORUS: CPT | Performed by: INTERNAL MEDICINE

## 2020-01-01 PROCEDURE — 84550 ASSAY OF BLOOD/URIC ACID: CPT | Performed by: INTERNAL MEDICINE

## 2020-01-01 PROCEDURE — 83010 ASSAY OF HAPTOGLOBIN QUANT: CPT | Performed by: INTERNAL MEDICINE

## 2020-01-01 PROCEDURE — 88311 DECALCIFY TISSUE: CPT | Performed by: PHYSICIAN ASSISTANT

## 2020-01-01 PROCEDURE — 87493 C DIFF AMPLIFIED PROBE: CPT | Performed by: INTERNAL MEDICINE

## 2020-01-01 PROCEDURE — 88313 SPECIAL STAINS GROUP 2: CPT | Performed by: PHYSICIAN ASSISTANT

## 2020-01-01 PROCEDURE — 85384 FIBRINOGEN ACTIVITY: CPT | Performed by: INTERNAL MEDICINE

## 2020-01-01 PROCEDURE — 25800030 ZZH RX IP 258 OP 636: Performed by: EMERGENCY MEDICINE

## 2020-01-01 PROCEDURE — 40000893 ZZH STATISTIC PT IP EVAL DEFER

## 2020-01-01 PROCEDURE — 99214 OFFICE O/P EST MOD 30 MIN: CPT | Mod: 95 | Performed by: PHYSICIAN ASSISTANT

## 2020-01-01 PROCEDURE — 40000951 ZZHCL STATISTIC BONE MARROW INTERP TC 85097: Performed by: PHYSICIAN ASSISTANT

## 2020-01-01 PROCEDURE — 80053 COMPREHEN METABOLIC PANEL: CPT | Performed by: EMERGENCY MEDICINE

## 2020-01-01 PROCEDURE — 25000128 H RX IP 250 OP 636: Mod: ZF | Performed by: PHYSICIAN ASSISTANT

## 2020-01-01 PROCEDURE — 82668 ASSAY OF ERYTHROPOIETIN: CPT | Performed by: INTERNAL MEDICINE

## 2020-01-01 PROCEDURE — 88237 TISSUE CULTURE BONE MARROW: CPT | Performed by: INTERNAL MEDICINE

## 2020-01-01 PROCEDURE — 83605 ASSAY OF LACTIC ACID: CPT | Performed by: INTERNAL MEDICINE

## 2020-01-01 PROCEDURE — 86901 BLOOD TYPING SEROLOGIC RH(D): CPT | Performed by: PHYSICIAN ASSISTANT

## 2020-01-01 PROCEDURE — 90662 IIV NO PRSV INCREASED AG IM: CPT | Mod: ZF | Performed by: INTERNAL MEDICINE

## 2020-01-01 PROCEDURE — 25000131 ZZH RX MED GY IP 250 OP 636 PS 637: Performed by: STUDENT IN AN ORGANIZED HEALTH CARE EDUCATION/TRAINING PROGRAM

## 2020-01-01 PROCEDURE — 40000611 ZZHCL STATISTIC MORPHOLOGY W/INTERP HEMEPATH TC 85060: Performed by: PHYSICIAN ASSISTANT

## 2020-01-01 PROCEDURE — 86140 C-REACTIVE PROTEIN: CPT | Performed by: INTERNAL MEDICINE

## 2020-01-01 PROCEDURE — G0463 HOSPITAL OUTPT CLINIC VISIT: HCPCS | Mod: 25

## 2020-01-01 PROCEDURE — 87186 SC STD MICRODIL/AGAR DIL: CPT | Performed by: INTERNAL MEDICINE

## 2020-01-01 PROCEDURE — 82746 ASSAY OF FOLIC ACID SERUM: CPT | Performed by: INTERNAL MEDICINE

## 2020-01-01 PROCEDURE — 36415 COLL VENOUS BLD VENIPUNCTURE: CPT | Mod: ZF

## 2020-01-01 PROCEDURE — 88341 IMHCHEM/IMCYTCHM EA ADD ANTB: CPT | Performed by: PHYSICIAN ASSISTANT

## 2020-01-01 PROCEDURE — 36416 COLLJ CAPILLARY BLOOD SPEC: CPT | Performed by: PHYSICIAN ASSISTANT

## 2020-01-01 PROCEDURE — 97165 OT EVAL LOW COMPLEX 30 MIN: CPT | Mod: GO

## 2020-01-01 PROCEDURE — P9016 RBC LEUKOCYTES REDUCED: HCPCS | Performed by: PHYSICIAN ASSISTANT

## 2020-01-01 PROCEDURE — 88342 IMHCHEM/IMCYTCHM 1ST ANTB: CPT | Mod: XU | Performed by: PHYSICIAN ASSISTANT

## 2020-01-01 PROCEDURE — 38222 DX BONE MARROW BX & ASPIR: CPT | Mod: ZF | Performed by: PHYSICIAN ASSISTANT

## 2020-01-01 PROCEDURE — 87081 CULTURE SCREEN ONLY: CPT | Performed by: INTERNAL MEDICINE

## 2020-01-01 PROCEDURE — 85730 THROMBOPLASTIN TIME PARTIAL: CPT | Performed by: INTERNAL MEDICINE

## 2020-01-01 PROCEDURE — P9037 PLATE PHERES LEUKOREDU IRRAD: HCPCS | Performed by: INTERNAL MEDICINE

## 2020-01-01 PROCEDURE — 99215 OFFICE O/P EST HI 40 MIN: CPT | Performed by: INTERNAL MEDICINE

## 2020-01-01 PROCEDURE — 88280 CHROMOSOME KARYOTYPE STUDY: CPT | Performed by: INTERNAL MEDICINE

## 2020-01-01 PROCEDURE — 81450 HL NEO GSAP 5-50DNA/DNA&RNA: CPT | Performed by: INTERNAL MEDICINE

## 2020-01-01 PROCEDURE — 97530 THERAPEUTIC ACTIVITIES: CPT | Mod: GO

## 2020-01-01 PROCEDURE — 83605 ASSAY OF LACTIC ACID: CPT | Performed by: EMERGENCY MEDICINE

## 2020-01-01 PROCEDURE — 96366 THER/PROPH/DIAG IV INF ADDON: CPT | Performed by: EMERGENCY MEDICINE

## 2020-01-01 PROCEDURE — 86850 RBC ANTIBODY SCREEN: CPT | Performed by: PHYSICIAN ASSISTANT

## 2020-01-01 PROCEDURE — 40001009 ZZH VIDEO/TELEPHONE VISIT; NO CHARGE

## 2020-01-01 PROCEDURE — 00000161 ZZHCL STATISTIC H-SPHEME PROCESS B/S: Performed by: PHYSICIAN ASSISTANT

## 2020-01-01 PROCEDURE — 40001005 ZZHCL STATISTIC FLOW >15 ABY TC 88189: Performed by: INTERNAL MEDICINE

## 2020-01-01 PROCEDURE — 25000132 ZZH RX MED GY IP 250 OP 250 PS 637: Performed by: STUDENT IN AN ORGANIZED HEALTH CARE EDUCATION/TRAINING PROGRAM

## 2020-01-01 PROCEDURE — 88271 CYTOGENETICS DNA PROBE: CPT | Performed by: INTERNAL MEDICINE

## 2020-01-01 PROCEDURE — 74230 X-RAY XM SWLNG FUNCJ C+: CPT

## 2020-01-01 PROCEDURE — 82962 GLUCOSE BLOOD TEST: CPT

## 2020-01-01 PROCEDURE — 25800030 ZZH RX IP 258 OP 636: Performed by: INTERNAL MEDICINE

## 2020-01-01 PROCEDURE — 87205 SMEAR GRAM STAIN: CPT | Performed by: INTERNAL MEDICINE

## 2020-01-01 PROCEDURE — 85610 PROTHROMBIN TIME: CPT | Performed by: PHYSICIAN ASSISTANT

## 2020-01-01 PROCEDURE — 97535 SELF CARE MNGMENT TRAINING: CPT | Mod: GO

## 2020-01-01 PROCEDURE — 86880 COOMBS TEST DIRECT: CPT | Performed by: INTERNAL MEDICINE

## 2020-01-01 PROCEDURE — G0008 ADMIN INFLUENZA VIRUS VAC: HCPCS

## 2020-01-01 PROCEDURE — 87070 CULTURE OTHR SPECIMN AEROBIC: CPT | Performed by: INTERNAL MEDICINE

## 2020-01-01 PROCEDURE — 99207 ZZC NO CHARGE LOS: CPT

## 2020-01-01 PROCEDURE — 84484 ASSAY OF TROPONIN QUANT: CPT | Performed by: EMERGENCY MEDICINE

## 2020-01-01 RX ORDER — HEPARIN SODIUM (PORCINE) LOCK FLUSH IV SOLN 100 UNIT/ML 100 UNIT/ML
5 SOLUTION INTRAVENOUS
Status: CANCELLED | OUTPATIENT
Start: 2020-01-01

## 2020-01-01 RX ORDER — ALBUTEROL SULFATE 0.83 MG/ML
2.5 SOLUTION RESPIRATORY (INHALATION)
Status: CANCELLED | OUTPATIENT
Start: 2020-01-01

## 2020-01-01 RX ORDER — SODIUM CHLORIDE 9 MG/ML
1000 INJECTION, SOLUTION INTRAVENOUS CONTINUOUS PRN
Status: CANCELLED
Start: 2020-01-01

## 2020-01-01 RX ORDER — DIPHENHYDRAMINE HYDROCHLORIDE 50 MG/ML
50 INJECTION INTRAMUSCULAR; INTRAVENOUS
Status: CANCELLED
Start: 2020-01-01

## 2020-01-01 RX ORDER — DIPHENHYDRAMINE HCL 25 MG
25 CAPSULE ORAL EVERY 6 HOURS PRN
Status: CANCELLED
Start: 2020-01-01

## 2020-01-01 RX ORDER — MEPERIDINE HYDROCHLORIDE 25 MG/ML
25 INJECTION INTRAMUSCULAR; INTRAVENOUS; SUBCUTANEOUS EVERY 30 MIN PRN
Status: CANCELLED | OUTPATIENT
Start: 2020-01-01

## 2020-01-01 RX ORDER — EPINEPHRINE 1 MG/ML
0.3 INJECTION, SOLUTION INTRAMUSCULAR; SUBCUTANEOUS EVERY 5 MIN PRN
Status: CANCELLED | OUTPATIENT
Start: 2020-01-01

## 2020-01-01 RX ORDER — HEPARIN SODIUM,PORCINE 10 UNIT/ML
5 VIAL (ML) INTRAVENOUS
Status: CANCELLED | OUTPATIENT
Start: 2020-01-01

## 2020-01-01 RX ORDER — METHYLPREDNISOLONE SODIUM SUCCINATE 125 MG/2ML
125 INJECTION, POWDER, LYOPHILIZED, FOR SOLUTION INTRAMUSCULAR; INTRAVENOUS
Status: CANCELLED
Start: 2020-01-01

## 2020-01-01 RX ORDER — ACETAMINOPHEN 325 MG/1
650 TABLET ORAL EVERY 4 HOURS PRN
Status: DISCONTINUED | OUTPATIENT
Start: 2020-01-01 | End: 2020-01-01 | Stop reason: HOSPADM

## 2020-01-01 RX ORDER — DIPHENHYDRAMINE HCL 25 MG
25 CAPSULE ORAL ONCE
Status: COMPLETED | OUTPATIENT
Start: 2020-01-01 | End: 2020-01-01

## 2020-01-01 RX ORDER — ALBUTEROL SULFATE 90 UG/1
1-2 AEROSOL, METERED RESPIRATORY (INHALATION)
Status: CANCELLED
Start: 2020-01-01

## 2020-01-01 RX ORDER — ACETAMINOPHEN 325 MG/1
650 TABLET ORAL ONCE
Status: CANCELLED
Start: 2020-01-01

## 2020-01-01 RX ORDER — HYDROXYZINE HYDROCHLORIDE 25 MG/1
25 TABLET, FILM COATED ORAL 3 TIMES DAILY PRN
Status: DISCONTINUED | OUTPATIENT
Start: 2020-01-01 | End: 2020-01-01 | Stop reason: HOSPADM

## 2020-01-01 RX ORDER — ONDANSETRON 8 MG/1
8 TABLET, FILM COATED ORAL EVERY 8 HOURS PRN
Qty: 10 TABLET | Refills: 5 | Status: SHIPPED | OUTPATIENT
Start: 2020-01-01 | End: 2020-01-01

## 2020-01-01 RX ORDER — LORAZEPAM 2 MG/ML
0.5 INJECTION INTRAMUSCULAR EVERY 4 HOURS PRN
Status: CANCELLED
Start: 2020-01-01

## 2020-01-01 RX ORDER — EPINEPHRINE 0.3 MG/.3ML
0.3 INJECTION SUBCUTANEOUS EVERY 5 MIN PRN
Status: CANCELLED | OUTPATIENT
Start: 2020-01-01

## 2020-01-01 RX ORDER — ACETAMINOPHEN 325 MG/1
650 TABLET ORAL ONCE
Status: COMPLETED | OUTPATIENT
Start: 2020-01-01 | End: 2020-01-01

## 2020-01-01 RX ORDER — LOPERAMIDE HCL 2 MG
2 CAPSULE ORAL 4 TIMES DAILY PRN
Qty: 30 CAPSULE | Refills: 0 | Status: SHIPPED | OUTPATIENT
Start: 2020-01-01

## 2020-01-01 RX ORDER — ACETAMINOPHEN 500 MG
1000 TABLET ORAL ONCE
Status: COMPLETED | OUTPATIENT
Start: 2020-01-01 | End: 2020-01-01

## 2020-01-01 RX ORDER — POTASSIUM CHLORIDE 750 MG/1
20-40 TABLET, EXTENDED RELEASE ORAL
Status: DISCONTINUED | OUTPATIENT
Start: 2020-01-01 | End: 2020-01-01 | Stop reason: HOSPADM

## 2020-01-01 RX ORDER — OXYMETAZOLINE HYDROCHLORIDE 0.05 G/100ML
2 SPRAY NASAL 2 TIMES DAILY
Status: DISCONTINUED | OUTPATIENT
Start: 2020-01-01 | End: 2020-01-01 | Stop reason: HOSPADM

## 2020-01-01 RX ORDER — POTASSIUM CHLORIDE 29.8 MG/ML
20 INJECTION INTRAVENOUS
Status: DISCONTINUED | OUTPATIENT
Start: 2020-01-01 | End: 2020-01-01 | Stop reason: HOSPADM

## 2020-01-01 RX ORDER — SODIUM CHLORIDE 9 MG/ML
INJECTION, SOLUTION INTRAVENOUS CONTINUOUS
Status: ACTIVE | OUTPATIENT
Start: 2020-01-01 | End: 2020-01-01

## 2020-01-01 RX ORDER — POTASSIUM CL/LIDO/0.9 % NACL 10MEQ/0.1L
10 INTRAVENOUS SOLUTION, PIGGYBACK (ML) INTRAVENOUS
Status: DISCONTINUED | OUTPATIENT
Start: 2020-01-01 | End: 2020-01-01 | Stop reason: HOSPADM

## 2020-01-01 RX ORDER — NALOXONE HYDROCHLORIDE 0.4 MG/ML
.1-.4 INJECTION, SOLUTION INTRAMUSCULAR; INTRAVENOUS; SUBCUTANEOUS
Status: CANCELLED | OUTPATIENT
Start: 2020-01-01

## 2020-01-01 RX ORDER — DIPHENHYDRAMINE HCL 25 MG
25 CAPSULE ORAL EVERY 6 HOURS PRN
Status: DISCONTINUED | OUTPATIENT
Start: 2020-01-01 | End: 2020-01-01 | Stop reason: HOSPADM

## 2020-01-01 RX ORDER — DIPHENHYDRAMINE HCL 25 MG
25 CAPSULE ORAL ONCE
Status: CANCELLED
Start: 2020-01-01

## 2020-01-01 RX ORDER — SULFAMETHOXAZOLE/TRIMETHOPRIM 800-160 MG
1 TABLET ORAL DAILY
Status: DISCONTINUED | OUTPATIENT
Start: 2020-01-01 | End: 2020-01-01

## 2020-01-01 RX ORDER — ACETAMINOPHEN 325 MG/1
325 TABLET ORAL ONCE
Status: CANCELLED
Start: 2020-01-01

## 2020-01-01 RX ORDER — PROCHLORPERAZINE MALEATE 10 MG
5 TABLET ORAL EVERY 6 HOURS PRN
Qty: 30 TABLET | Refills: 5 | Status: SHIPPED | OUTPATIENT
Start: 2020-01-01 | End: 2020-01-01

## 2020-01-01 RX ORDER — DIPHENHYDRAMINE HYDROCHLORIDE 50 MG/ML
25 INJECTION INTRAMUSCULAR; INTRAVENOUS EVERY 6 HOURS PRN
Status: DISCONTINUED | OUTPATIENT
Start: 2020-01-01 | End: 2020-01-01 | Stop reason: HOSPADM

## 2020-01-01 RX ORDER — ONDANSETRON 8 MG/1
8 TABLET, FILM COATED ORAL EVERY 8 HOURS PRN
Qty: 10 TABLET | Refills: 5 | Status: SHIPPED | OUTPATIENT
Start: 2020-01-01

## 2020-01-01 RX ORDER — PROCHLORPERAZINE MALEATE 10 MG
5 TABLET ORAL EVERY 6 HOURS PRN
Qty: 30 TABLET | Refills: 5 | Status: SHIPPED | OUTPATIENT
Start: 2020-01-01

## 2020-01-01 RX ORDER — POTASSIUM CHLORIDE 7.45 MG/ML
10 INJECTION INTRAVENOUS
Status: DISCONTINUED | OUTPATIENT
Start: 2020-01-01 | End: 2020-01-01 | Stop reason: HOSPADM

## 2020-01-01 RX ORDER — DIPHENHYDRAMINE HYDROCHLORIDE 50 MG/ML
50 INJECTION INTRAMUSCULAR; INTRAVENOUS EVERY 6 HOURS PRN
Status: DISCONTINUED | OUTPATIENT
Start: 2020-01-01 | End: 2020-01-01 | Stop reason: HOSPADM

## 2020-01-01 RX ORDER — METHYLPREDNISOLONE SODIUM SUCCINATE 125 MG/2ML
125 INJECTION, POWDER, LYOPHILIZED, FOR SOLUTION INTRAMUSCULAR; INTRAVENOUS ONCE
Status: COMPLETED | OUTPATIENT
Start: 2020-01-01 | End: 2020-01-01

## 2020-01-01 RX ORDER — POTASSIUM CHLORIDE 1.5 G/1.58G
20-40 POWDER, FOR SOLUTION ORAL
Status: DISCONTINUED | OUTPATIENT
Start: 2020-01-01 | End: 2020-01-01 | Stop reason: HOSPADM

## 2020-01-01 RX ORDER — ACETAMINOPHEN 325 MG/1
325 TABLET ORAL ONCE
Status: COMPLETED | OUTPATIENT
Start: 2020-01-01 | End: 2020-01-01

## 2020-01-01 RX ORDER — FLUCONAZOLE 200 MG/1
200 TABLET ORAL DAILY
Qty: 30 TABLET | Refills: 1 | Status: SHIPPED | OUTPATIENT
Start: 2020-01-01 | End: 2020-01-01

## 2020-01-01 RX ORDER — LORAZEPAM 0.5 MG/1
0.5 TABLET ORAL EVERY 4 HOURS PRN
Qty: 30 TABLET | Refills: 5 | Status: SHIPPED | OUTPATIENT
Start: 2020-01-01

## 2020-01-01 RX ORDER — LEVOFLOXACIN 750 MG/1
750 TABLET, FILM COATED ORAL DAILY
Qty: 4 TABLET | Refills: 0 | Status: SHIPPED | OUTPATIENT
Start: 2020-01-01 | End: 2020-01-01

## 2020-01-01 RX ORDER — DIPHENHYDRAMINE HYDROCHLORIDE 50 MG/ML
50 INJECTION INTRAMUSCULAR; INTRAVENOUS ONCE
Status: COMPLETED | OUTPATIENT
Start: 2020-01-01 | End: 2020-01-01

## 2020-01-01 RX ORDER — LORAZEPAM 0.5 MG/1
0.5 TABLET ORAL EVERY 4 HOURS PRN
Qty: 30 TABLET | Refills: 5 | Status: SHIPPED | OUTPATIENT
Start: 2020-01-01 | End: 2020-01-01

## 2020-01-01 RX ORDER — FLUCONAZOLE 200 MG/1
200 TABLET ORAL DAILY
Status: DISCONTINUED | OUTPATIENT
Start: 2020-01-01 | End: 2020-01-01 | Stop reason: HOSPADM

## 2020-01-01 RX ORDER — SODIUM CHLORIDE 9 MG/ML
INJECTION, SOLUTION INTRAVENOUS CONTINUOUS
Status: DISCONTINUED | OUTPATIENT
Start: 2020-01-01 | End: 2020-01-01

## 2020-01-01 RX ORDER — LEVOFLOXACIN 250 MG/1
250 TABLET, FILM COATED ORAL DAILY
Qty: 30 TABLET | Refills: 1 | Status: SHIPPED | OUTPATIENT
Start: 2020-01-01 | End: 2020-01-01

## 2020-01-01 RX ORDER — ALBUTEROL SULFATE 90 UG/1
2 AEROSOL, METERED RESPIRATORY (INHALATION) EVERY 4 HOURS PRN
Status: DISCONTINUED | OUTPATIENT
Start: 2020-01-01 | End: 2020-01-01 | Stop reason: HOSPADM

## 2020-01-01 RX ORDER — LORAZEPAM 0.5 MG/1
0.5 TABLET ORAL EVERY 4 HOURS PRN
Status: DISCONTINUED | OUTPATIENT
Start: 2020-01-01 | End: 2020-01-01 | Stop reason: HOSPADM

## 2020-01-01 RX ORDER — SULFAMETHOXAZOLE/TRIMETHOPRIM 800-160 MG
2 TABLET ORAL 2 TIMES DAILY
Status: DISCONTINUED | OUTPATIENT
Start: 2020-01-01 | End: 2020-01-01

## 2020-01-01 RX ORDER — LEVOFLOXACIN 250 MG/1
250 TABLET, FILM COATED ORAL DAILY
Qty: 90 TABLET | Refills: 1 | Status: SHIPPED | OUTPATIENT
Start: 2020-01-01

## 2020-01-01 RX ORDER — SULFAMETHOXAZOLE/TRIMETHOPRIM 800-160 MG
TABLET ORAL
Qty: 35 TABLET | Refills: 0 | Status: SHIPPED | OUTPATIENT
Start: 2020-01-01 | End: 2020-01-01

## 2020-01-01 RX ORDER — DIPHENHYDRAMINE HYDROCHLORIDE 50 MG/ML
25 INJECTION INTRAMUSCULAR; INTRAVENOUS EVERY 6 HOURS PRN
Status: DISCONTINUED | OUTPATIENT
Start: 2020-01-01 | End: 2020-01-01

## 2020-01-01 RX ORDER — PIPERACILLIN SODIUM, TAZOBACTAM SODIUM 3; .375 G/15ML; G/15ML
3.38 INJECTION, POWDER, LYOPHILIZED, FOR SOLUTION INTRAVENOUS EVERY 6 HOURS
Status: DISCONTINUED | OUTPATIENT
Start: 2020-01-01 | End: 2020-01-01

## 2020-01-01 RX ORDER — OXYMETAZOLINE HYDROCHLORIDE 0.05 G/100ML
2 SPRAY NASAL 2 TIMES DAILY PRN
Qty: 1 BOTTLE | Refills: 0 | Status: SHIPPED | OUTPATIENT
Start: 2020-01-01

## 2020-01-01 RX ORDER — DOCUSATE SODIUM 100 MG/1
100 CAPSULE, LIQUID FILLED ORAL 2 TIMES DAILY
Status: DISCONTINUED | OUTPATIENT
Start: 2020-01-01 | End: 2020-01-01 | Stop reason: HOSPADM

## 2020-01-01 RX ORDER — ONDANSETRON 8 MG/1
8 TABLET, FILM COATED ORAL EVERY 8 HOURS PRN
Status: DISCONTINUED | OUTPATIENT
Start: 2020-01-01 | End: 2020-01-01 | Stop reason: HOSPADM

## 2020-01-01 RX ORDER — PROCHLORPERAZINE MALEATE 5 MG
5 TABLET ORAL EVERY 6 HOURS PRN
Status: DISCONTINUED | OUTPATIENT
Start: 2020-01-01 | End: 2020-01-01 | Stop reason: HOSPADM

## 2020-01-01 RX ORDER — FAMOTIDINE 20 MG/1
20 TABLET, FILM COATED ORAL DAILY
Status: DISCONTINUED | OUTPATIENT
Start: 2020-01-01 | End: 2020-01-01 | Stop reason: HOSPADM

## 2020-01-01 RX ORDER — LOPERAMIDE HCL 2 MG
2 CAPSULE ORAL 4 TIMES DAILY PRN
Status: DISCONTINUED | OUTPATIENT
Start: 2020-01-01 | End: 2020-01-01 | Stop reason: HOSPADM

## 2020-01-01 RX ORDER — LEVOFLOXACIN 750 MG/1
750 TABLET, FILM COATED ORAL EVERY 24 HOURS
Qty: 6 TABLET | Refills: 0 | Status: SHIPPED | OUTPATIENT
Start: 2020-01-01 | End: 2020-01-01

## 2020-01-01 RX ORDER — VANCOMYCIN HYDROCHLORIDE 1 G/200ML
1000 INJECTION, SOLUTION INTRAVENOUS EVERY 12 HOURS
Status: DISCONTINUED | OUTPATIENT
Start: 2020-01-01 | End: 2020-01-01

## 2020-01-01 RX ORDER — METHYLPREDNISOLONE SODIUM SUCCINATE 125 MG/2ML
125 INJECTION, POWDER, LYOPHILIZED, FOR SOLUTION INTRAMUSCULAR; INTRAVENOUS ONCE
Status: DISCONTINUED | OUTPATIENT
Start: 2020-01-01 | End: 2020-01-01 | Stop reason: HOSPADM

## 2020-01-01 RX ORDER — FAMOTIDINE 20 MG/1
TABLET, FILM COATED ORAL
COMMUNITY
Start: 2020-01-01

## 2020-01-01 RX ORDER — LEVOFLOXACIN 750 MG/1
750 TABLET, FILM COATED ORAL EVERY 24 HOURS
Status: DISCONTINUED | OUTPATIENT
Start: 2020-01-01 | End: 2020-01-01 | Stop reason: HOSPADM

## 2020-01-01 RX ORDER — MAGNESIUM SULFATE HEPTAHYDRATE 40 MG/ML
4 INJECTION, SOLUTION INTRAVENOUS EVERY 4 HOURS PRN
Status: DISCONTINUED | OUTPATIENT
Start: 2020-01-01 | End: 2020-01-01 | Stop reason: HOSPADM

## 2020-01-01 RX ORDER — FLUCONAZOLE 200 MG/1
200 TABLET ORAL DAILY
Qty: 90 TABLET | Refills: 1 | Status: SHIPPED | OUTPATIENT
Start: 2020-01-01

## 2020-01-01 RX ORDER — PREDNISONE 10 MG/1
10 TABLET ORAL EVERY 8 HOURS PRN
Status: DISCONTINUED | OUTPATIENT
Start: 2020-01-01 | End: 2020-01-01 | Stop reason: HOSPADM

## 2020-01-01 RX ADMIN — DIPHENHYDRAMINE HYDROCHLORIDE 25 MG: 25 CAPSULE ORAL at 08:07

## 2020-01-01 RX ADMIN — PIPERACILLIN AND TAZOBACTAM 3.38 G: 3; .375 INJECTION, POWDER, FOR SOLUTION INTRAVENOUS at 08:20

## 2020-01-01 RX ADMIN — ACETAMINOPHEN 325 MG: 325 TABLET ORAL at 13:42

## 2020-01-01 RX ADMIN — DEXAMETHASONE SODIUM PHOSPHATE: 10 INJECTION, SOLUTION INTRAMUSCULAR; INTRAVENOUS at 15:18

## 2020-01-01 RX ADMIN — PIPERACILLIN AND TAZOBACTAM 3.38 G: 3; .375 INJECTION, POWDER, FOR SOLUTION INTRAVENOUS at 19:47

## 2020-01-01 RX ADMIN — DIPHENHYDRAMINE HYDROCHLORIDE 25 MG: 25 CAPSULE ORAL at 13:01

## 2020-01-01 RX ADMIN — CEFEPIME 2 G: 2 INJECTION, POWDER, FOR SOLUTION INTRAVENOUS at 21:17

## 2020-01-01 RX ADMIN — ACETAMINOPHEN 650 MG: 325 TABLET ORAL at 13:01

## 2020-01-01 RX ADMIN — DIPHENHYDRAMINE HYDROCHLORIDE 25 MG: 50 INJECTION, SOLUTION INTRAMUSCULAR; INTRAVENOUS at 13:03

## 2020-01-01 RX ADMIN — DOCUSATE SODIUM 100 MG: 100 CAPSULE, LIQUID FILLED ORAL at 08:15

## 2020-01-01 RX ADMIN — AZACITIDINE 71 MG: 100 INJECTION, POWDER, LYOPHILIZED, FOR SOLUTION INTRAVENOUS; SUBCUTANEOUS at 15:07

## 2020-01-01 RX ADMIN — VANCOMYCIN HYDROCHLORIDE 1000 MG: 1 INJECTION, SOLUTION INTRAVENOUS at 10:19

## 2020-01-01 RX ADMIN — FLUCONAZOLE 200 MG: 200 TABLET ORAL at 08:15

## 2020-01-01 RX ADMIN — SODIUM CHLORIDE: 9 INJECTION, SOLUTION INTRAVENOUS at 19:47

## 2020-01-01 RX ADMIN — AZACITIDINE 94 MG: 100 INJECTION, POWDER, LYOPHILIZED, FOR SOLUTION INTRAVENOUS; SUBCUTANEOUS at 14:30

## 2020-01-01 RX ADMIN — AZACITIDINE 71 MG: 100 INJECTION, POWDER, LYOPHILIZED, FOR SOLUTION INTRAVENOUS; SUBCUTANEOUS at 15:10

## 2020-01-01 RX ADMIN — SODIUM CHLORIDE 250 ML: 9 INJECTION, SOLUTION INTRAVENOUS at 14:42

## 2020-01-01 RX ADMIN — AZACITIDINE 94 MG: 100 INJECTION, POWDER, LYOPHILIZED, FOR SOLUTION INTRAVENOUS; SUBCUTANEOUS at 10:21

## 2020-01-01 RX ADMIN — DIPHENHYDRAMINE HYDROCHLORIDE 25 MG: 25 CAPSULE ORAL at 09:48

## 2020-01-01 RX ADMIN — VANCOMYCIN HYDROCHLORIDE 1000 MG: 1 INJECTION, SOLUTION INTRAVENOUS at 09:55

## 2020-01-01 RX ADMIN — FLUTICASONE FUROATE AND VILANTEROL TRIFENATATE 1 PUFF: 100; 25 POWDER RESPIRATORY (INHALATION) at 08:51

## 2020-01-01 RX ADMIN — ACETAMINOPHEN 650 MG: 325 TABLET ORAL at 07:23

## 2020-01-01 RX ADMIN — AZACITIDINE 141 MG: 100 INJECTION, POWDER, LYOPHILIZED, FOR SOLUTION INTRAVENOUS; SUBCUTANEOUS at 13:10

## 2020-01-01 RX ADMIN — DEXAMETHASONE SODIUM PHOSPHATE: 10 INJECTION, SOLUTION INTRAMUSCULAR; INTRAVENOUS at 14:43

## 2020-01-01 RX ADMIN — ACETAMINOPHEN 650 MG: 325 TABLET ORAL at 13:09

## 2020-01-01 RX ADMIN — SODIUM CHLORIDE 250 ML: 9 INJECTION, SOLUTION INTRAVENOUS at 14:10

## 2020-01-01 RX ADMIN — LEVOFLOXACIN 750 MG: 750 TABLET, FILM COATED ORAL at 11:27

## 2020-01-01 RX ADMIN — DEXAMETHASONE SODIUM PHOSPHATE: 10 INJECTION, SOLUTION INTRAMUSCULAR; INTRAVENOUS at 13:58

## 2020-01-01 RX ADMIN — SODIUM CHLORIDE 500 ML: 9 INJECTION, SOLUTION INTRAVENOUS at 19:12

## 2020-01-01 RX ADMIN — ACETAMINOPHEN 650 MG: 325 TABLET ORAL at 10:14

## 2020-01-01 RX ADMIN — SODIUM CHLORIDE 250 ML: 9 INJECTION, SOLUTION INTRAVENOUS at 14:35

## 2020-01-01 RX ADMIN — DIPHENHYDRAMINE HYDROCHLORIDE 25 MG: 25 CAPSULE ORAL at 13:13

## 2020-01-01 RX ADMIN — ACETAMINOPHEN 1000 MG: 500 TABLET, FILM COATED ORAL at 19:12

## 2020-01-01 RX ADMIN — DIPHENHYDRAMINE HYDROCHLORIDE 50 MG: 50 INJECTION, SOLUTION INTRAMUSCULAR; INTRAVENOUS at 13:03

## 2020-01-01 RX ADMIN — DIPHENHYDRAMINE HYDROCHLORIDE 25 MG: 25 CAPSULE ORAL at 02:26

## 2020-01-01 RX ADMIN — SULFAMETHOXAZOLE AND TRIMETHOPRIM 1 TABLET: 800; 160 TABLET ORAL at 13:45

## 2020-01-01 RX ADMIN — FAMOTIDINE 20 MG: 10 INJECTION INTRAVENOUS at 13:28

## 2020-01-01 RX ADMIN — ACETAMINOPHEN 650 MG: 325 TABLET ORAL at 08:58

## 2020-01-01 RX ADMIN — SODIUM CHLORIDE 250 ML: 9 INJECTION, SOLUTION INTRAVENOUS at 13:58

## 2020-01-01 RX ADMIN — SODIUM CHLORIDE 250 ML: 9 INJECTION, SOLUTION INTRAVENOUS at 14:17

## 2020-01-01 RX ADMIN — AZACITIDINE 71 MG: 100 INJECTION, POWDER, LYOPHILIZED, FOR SOLUTION INTRAVENOUS; SUBCUTANEOUS at 15:06

## 2020-01-01 RX ADMIN — PIPERACILLIN AND TAZOBACTAM 3.38 G: 3; .375 INJECTION, POWDER, FOR SOLUTION INTRAVENOUS at 02:15

## 2020-01-01 RX ADMIN — DEXAMETHASONE SODIUM PHOSPHATE: 10 INJECTION, SOLUTION INTRAMUSCULAR; INTRAVENOUS at 14:11

## 2020-01-01 RX ADMIN — AZACITIDINE 94 MG: 100 INJECTION, POWDER, LYOPHILIZED, FOR SOLUTION INTRAVENOUS; SUBCUTANEOUS at 14:04

## 2020-01-01 RX ADMIN — PIPERACILLIN AND TAZOBACTAM 3.38 G: 3; .375 INJECTION, POWDER, FOR SOLUTION INTRAVENOUS at 14:10

## 2020-01-01 RX ADMIN — DIPHENHYDRAMINE HYDROCHLORIDE 50 MG: 50 INJECTION, SOLUTION INTRAMUSCULAR; INTRAVENOUS at 15:00

## 2020-01-01 RX ADMIN — SODIUM CHLORIDE 250 ML: 9 INJECTION, SOLUTION INTRAVENOUS at 11:08

## 2020-01-01 RX ADMIN — DIPHENHYDRAMINE HYDROCHLORIDE 25 MG: 25 CAPSULE ORAL at 08:58

## 2020-01-01 RX ADMIN — AZACITIDINE 71 MG: 100 INJECTION, POWDER, LYOPHILIZED, FOR SOLUTION INTRAVENOUS; SUBCUTANEOUS at 14:37

## 2020-01-01 RX ADMIN — DEXAMETHASONE SODIUM PHOSPHATE: 10 INJECTION, SOLUTION INTRAMUSCULAR; INTRAVENOUS at 14:17

## 2020-01-01 RX ADMIN — INFLUENZA A VIRUS A/MICHIGAN/45/2015 X-275 (H1N1) ANTIGEN (FORMALDEHYDE INACTIVATED), INFLUENZA A VIRUS A/SINGAPORE/INFIMH-16-0019/2016 IVR-186 (H3N2) ANTIGEN (FORMALDEHYDE INACTIVATED), AND INFLUENZA B VIRUS B/MARYLAND/15/2016 BX-69A (A B/COLORADO/6/2017-LIKE VIRUS) ANTIGEN (FORMALDEHYDE INACTIVATED) 0.5 ML: 60; 60; 60 INJECTION, SUSPENSION INTRAMUSCULAR at 15:45

## 2020-01-01 RX ADMIN — HYDROCORTISONE SODIUM SUCCINATE 100 MG: 100 INJECTION, POWDER, FOR SOLUTION INTRAMUSCULAR; INTRAVENOUS at 15:05

## 2020-01-01 RX ADMIN — DIPHENHYDRAMINE HYDROCHLORIDE 25 MG: 25 CAPSULE ORAL at 15:04

## 2020-01-01 RX ADMIN — OXYMETAZOLINE HYDROCHLORIDE 2 SPRAY: 0.05 SPRAY NASAL at 11:26

## 2020-01-01 RX ADMIN — AZACITIDINE 141 MG: 100 INJECTION, POWDER, LYOPHILIZED, FOR SOLUTION INTRAVENOUS; SUBCUTANEOUS at 13:33

## 2020-01-01 RX ADMIN — FLUTICASONE FUROATE AND VILANTEROL TRIFENATATE 1 PUFF: 100; 25 POWDER RESPIRATORY (INHALATION) at 08:41

## 2020-01-01 RX ADMIN — AZACITIDINE 71 MG: 100 INJECTION, POWDER, LYOPHILIZED, FOR SOLUTION INTRAVENOUS; SUBCUTANEOUS at 14:17

## 2020-01-01 RX ADMIN — DIPHENHYDRAMINE HYDROCHLORIDE 50 MG: 50 INJECTION, SOLUTION INTRAMUSCULAR; INTRAVENOUS at 11:20

## 2020-01-01 RX ADMIN — SODIUM CHLORIDE: 9 INJECTION, SOLUTION INTRAVENOUS at 03:32

## 2020-01-01 RX ADMIN — SODIUM CHLORIDE 250 ML: 9 INJECTION, SOLUTION INTRAVENOUS at 08:10

## 2020-01-01 RX ADMIN — FAMOTIDINE 20 MG: 20 TABLET, FILM COATED ORAL at 08:20

## 2020-01-01 RX ADMIN — METHYLPREDNISOLONE SODIUM SUCCINATE 125 MG: 125 INJECTION, POWDER, FOR SOLUTION INTRAMUSCULAR; INTRAVENOUS at 11:21

## 2020-01-01 RX ADMIN — SODIUM CHLORIDE 250 ML: 9 INJECTION, SOLUTION INTRAVENOUS at 15:18

## 2020-01-01 RX ADMIN — DIPHENHYDRAMINE HYDROCHLORIDE 25 MG: 25 CAPSULE ORAL at 10:31

## 2020-01-01 RX ADMIN — ACETAMINOPHEN 325 MG: 325 TABLET ORAL at 09:46

## 2020-01-01 RX ADMIN — AZACITIDINE 71 MG: 100 INJECTION, POWDER, LYOPHILIZED, FOR SOLUTION INTRAVENOUS; SUBCUTANEOUS at 15:40

## 2020-01-01 RX ADMIN — AZACITIDINE 141 MG: 100 INJECTION, POWDER, LYOPHILIZED, FOR SOLUTION INTRAVENOUS; SUBCUTANEOUS at 13:47

## 2020-01-01 RX ADMIN — PIPERACILLIN AND TAZOBACTAM 3.38 G: 3; .375 INJECTION, POWDER, FOR SOLUTION INTRAVENOUS at 08:44

## 2020-01-01 RX ADMIN — PIPERACILLIN AND TAZOBACTAM 3.38 G: 3; .375 INJECTION, POWDER, FOR SOLUTION INTRAVENOUS at 13:32

## 2020-01-01 RX ADMIN — DIPHENHYDRAMINE HYDROCHLORIDE 25 MG: 25 CAPSULE ORAL at 07:23

## 2020-01-01 RX ADMIN — AZACITIDINE 94 MG: 100 INJECTION, POWDER, LYOPHILIZED, FOR SOLUTION INTRAVENOUS; SUBCUTANEOUS at 13:55

## 2020-01-01 RX ADMIN — FAMOTIDINE 20 MG: 20 TABLET, FILM COATED ORAL at 08:51

## 2020-01-01 RX ADMIN — DEXAMETHASONE SODIUM PHOSPHATE: 10 INJECTION, SOLUTION INTRAMUSCULAR; INTRAVENOUS at 14:00

## 2020-01-01 RX ADMIN — LEVOFLOXACIN 750 MG: 750 TABLET, FILM COATED ORAL at 20:30

## 2020-01-01 RX ADMIN — SODIUM CHLORIDE: 9 INJECTION, SOLUTION INTRAVENOUS at 17:54

## 2020-01-01 RX ADMIN — DIPHENHYDRAMINE HYDROCHLORIDE 25 MG: 25 CAPSULE ORAL at 10:14

## 2020-01-01 RX ADMIN — PREDNISONE 10 MG: 10 TABLET ORAL at 14:42

## 2020-01-01 RX ADMIN — ACETAMINOPHEN 650 MG: 325 TABLET ORAL at 15:04

## 2020-01-01 RX ADMIN — FLUCONAZOLE 200 MG: 200 TABLET ORAL at 14:10

## 2020-01-01 RX ADMIN — DEXAMETHASONE SODIUM PHOSPHATE: 10 INJECTION, SOLUTION INTRAMUSCULAR; INTRAVENOUS at 14:35

## 2020-01-01 RX ADMIN — ACETAMINOPHEN 650 MG: 325 TABLET ORAL at 13:13

## 2020-01-01 RX ADMIN — FAMOTIDINE 20 MG: 20 TABLET, FILM COATED ORAL at 08:15

## 2020-01-01 RX ADMIN — ACETAMINOPHEN 650 MG: 325 TABLET ORAL at 10:30

## 2020-01-01 RX ADMIN — PIPERACILLIN AND TAZOBACTAM 3.38 G: 3; .375 INJECTION, POWDER, FOR SOLUTION INTRAVENOUS at 08:15

## 2020-01-01 RX ADMIN — METHYLPREDNISOLONE SODIUM SUCCINATE 125 MG: 125 INJECTION, POWDER, FOR SOLUTION INTRAMUSCULAR; INTRAVENOUS at 12:59

## 2020-01-01 RX ADMIN — DIPHENHYDRAMINE HYDROCHLORIDE 25 MG: 25 CAPSULE ORAL at 09:47

## 2020-01-01 RX ADMIN — HYDROXYZINE HYDROCHLORIDE 25 MG: 25 TABLET ORAL at 16:24

## 2020-01-01 RX ADMIN — PIPERACILLIN AND TAZOBACTAM 3.38 G: 3; .375 INJECTION, POWDER, FOR SOLUTION INTRAVENOUS at 02:56

## 2020-01-01 RX ADMIN — VANCOMYCIN HYDROCHLORIDE 1000 MG: 1 INJECTION, SOLUTION INTRAVENOUS at 22:05

## 2020-01-01 RX ADMIN — DOCUSATE SODIUM 100 MG: 100 CAPSULE, LIQUID FILLED ORAL at 08:43

## 2020-01-01 RX ADMIN — AZACITIDINE 94 MG: 100 INJECTION, POWDER, LYOPHILIZED, FOR SOLUTION INTRAVENOUS; SUBCUTANEOUS at 13:58

## 2020-01-01 RX ADMIN — PIPERACILLIN AND TAZOBACTAM 3.38 G: 3; .375 INJECTION, POWDER, FOR SOLUTION INTRAVENOUS at 19:26

## 2020-01-01 RX ADMIN — SODIUM CHLORIDE: 9 INJECTION, SOLUTION INTRAVENOUS at 04:42

## 2020-01-01 RX ADMIN — AZACITIDINE 71 MG: 100 INJECTION, POWDER, LYOPHILIZED, FOR SOLUTION INTRAVENOUS; SUBCUTANEOUS at 11:35

## 2020-01-01 RX ADMIN — FLUCONAZOLE 200 MG: 200 TABLET ORAL at 08:20

## 2020-01-01 RX ADMIN — AZACITIDINE 141 MG: 100 INJECTION, POWDER, LYOPHILIZED, FOR SOLUTION INTRAVENOUS; SUBCUTANEOUS at 13:39

## 2020-01-01 RX ADMIN — AZACITIDINE 71 MG: 100 INJECTION, POWDER, LYOPHILIZED, FOR SOLUTION INTRAVENOUS; SUBCUTANEOUS at 08:43

## 2020-01-01 RX ADMIN — DOCUSATE SODIUM 100 MG: 100 CAPSULE, LIQUID FILLED ORAL at 19:28

## 2020-01-01 RX ADMIN — SODIUM CHLORIDE 250 ML: 9 INJECTION, SOLUTION INTRAVENOUS at 10:18

## 2020-01-01 RX ADMIN — VANCOMYCIN HYDROCHLORIDE 1750 MG: 1 INJECTION, POWDER, LYOPHILIZED, FOR SOLUTION INTRAVENOUS at 22:52

## 2020-01-01 RX ADMIN — DEXAMETHASONE SODIUM PHOSPHATE: 10 INJECTION, SOLUTION INTRAMUSCULAR; INTRAVENOUS at 08:10

## 2020-01-01 RX ADMIN — FLUTICASONE FUROATE AND VILANTEROL TRIFENATATE 1 PUFF: 100; 25 POWDER RESPIRATORY (INHALATION) at 08:16

## 2020-01-01 RX ADMIN — LOPERAMIDE HYDROCHLORIDE 2 MG: 2 CAPSULE ORAL at 20:30

## 2020-01-01 RX ADMIN — DIPHENHYDRAMINE HYDROCHLORIDE 25 MG: 25 CAPSULE ORAL at 13:09

## 2020-01-01 RX ADMIN — FLUTICASONE FUROATE AND VILANTEROL TRIFENATATE 1 PUFF: 100; 25 POWDER RESPIRATORY (INHALATION) at 08:20

## 2020-01-01 RX ADMIN — ACETAMINOPHEN 650 MG: 325 TABLET ORAL at 09:48

## 2020-01-01 RX ADMIN — DEXAMETHASONE SODIUM PHOSPHATE: 10 INJECTION, SOLUTION INTRAMUSCULAR; INTRAVENOUS at 11:11

## 2020-01-01 RX ADMIN — FLUCONAZOLE 200 MG: 200 TABLET ORAL at 08:51

## 2020-01-01 RX ADMIN — FAMOTIDINE 20 MG: 20 TABLET, FILM COATED ORAL at 08:43

## 2020-01-01 RX ADMIN — DIPHENHYDRAMINE HYDROCHLORIDE 25 MG: 50 INJECTION, SOLUTION INTRAMUSCULAR; INTRAVENOUS at 15:30

## 2020-01-01 RX ADMIN — ACETAMINOPHEN 650 MG: 325 TABLET ORAL at 08:07

## 2020-01-01 RX ADMIN — AZACITIDINE 71 MG: 100 INJECTION, POWDER, LYOPHILIZED, FOR SOLUTION INTRAVENOUS; SUBCUTANEOUS at 14:21

## 2020-01-01 RX ADMIN — ACETAMINOPHEN 650 MG: 325 TABLET, FILM COATED ORAL at 13:02

## 2020-01-01 RX ADMIN — PIPERACILLIN AND TAZOBACTAM 3.38 G: 3; .375 INJECTION, POWDER, FOR SOLUTION INTRAVENOUS at 02:11

## 2020-01-01 RX ADMIN — AZACITIDINE 71 MG: 100 INJECTION, POWDER, LYOPHILIZED, FOR SOLUTION INTRAVENOUS; SUBCUTANEOUS at 14:32

## 2020-01-01 ASSESSMENT — PAIN SCALES - GENERAL
PAINLEVEL: NO PAIN (0)

## 2020-01-01 ASSESSMENT — MIFFLIN-ST. JEOR
SCORE: 1437.53
SCORE: 1410.77
SCORE: 1476.97
SCORE: 1478.32
SCORE: 1403.96
SCORE: 1414.39
SCORE: 1462.28
SCORE: 1418.93
SCORE: 1422.11
SCORE: 1419.84

## 2020-01-01 ASSESSMENT — ACTIVITIES OF DAILY LIVING (ADL)
ADLS_ACUITY_SCORE: 10
FALL_HISTORY_WITHIN_LAST_SIX_MONTHS: NO
ADLS_ACUITY_SCORE: 10
ADLS_ACUITY_SCORE: 10
ADLS_ACUITY_SCORE: 11
ADLS_ACUITY_SCORE: 10

## 2020-01-14 NOTE — PROGRESS NOTES
REASON FOR VISIT:    CANCER STAGE: Cancer Staging  No matching staging information was found for the patient.      HISTORY OF PRESENT ILLNESS:  71-year-old non-English speaking Japanese Male  -In [facility] was diagnosed with herpes zoster in 10/2013. Ever since that point he has continued to have a burning sensation and pain in his R arm which never improved. In [facility] he underwent MRI for further evaluation of a thoracic origin and was diagnosed with a R pancoast tumor. He then underwent a CT CAP which found a 5-cm mass in the right apex invading the T2-T3 vertebral bodies and probably ribs 2 and 3. This mass is abutting subclavian artery with no david invasion. He was told not much can be done for him so the patient and his wife traveled to [place] for further care.   - 3/31/2014 EUS, EBUS with FNA revealed adeno-squamous carcinoma. The 4R node was positive but not 4L or 7.   - EGFR mutations were negative  -MRI brain was negative  - 04/07/14 PET showed in addition to main mass, a hypermetabolic precarinal nodes which likely describes stations 4L and 4R, both enlarged with hypermetabolism with the addition of a nodular thickening along the anterior pleura measuring 1.3 x 0.8 cm with a max SUV of 7.1. There is no evidence of disease outside the chest.   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  - 6/2014 completed chemoradiation with cisplatin/Etoposide (6000 cGy in 30 fractions).  - 6/24/14 RIJ DVT associated with his port.   - Restaging scans showed a good response to treatment     - In Dec 2015 was noted to have new pancytopenia. Lab workup negative (hep B, C, HIV, CMV, TSH, Iron studies, ferritin, folic acid, Vitamin B12, retic, and blood smear). He had a bone marrow biopsy in Jan 2016. 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 Given 5 days of IVIG 2/8  "to 2/12.      Pt is doing well. He was able tot [facility]vel to [facility] summer. He is otherwise without much complaints. He has had cough for the past few weeks, but was given doxycycline for presumed bronchitis and he is feeling better. He has been of doxy now for several days.  He has no fevers, chills or sweats.     Review Of Systems  10-point review of systems were negative except as noted in HPI.        EXAM:  Blood pressure 138/68, pulse 92, temperature 98.4  F (36.9  C), temperature source Oral, resp. rate 16, height 1.753 m (5' 9.02\"), weight 73.2 kg (161 lb 4.8 oz), SpO2 94 %.  I did not examine him today.     Current Outpatient Medications   Medication Sig Dispense Refill     Acetaminophen (TYLENOL PO) Take 500 mg by mouth       ALBUTEROL 108 (90 BASE) MCG/ACT inhaler INHALE 2 PUFFS BY MOUTH EVERY 4 HOURS AS NEEDED  3     DULERA 100-5 MCG/ACT oral inhaler INHALE 2 PUFFS BY MOUTH TWICE DAILY  3     selenium sulfide (SELSUN) 2.5 % external lotion 1 APPLICATION TO AFFECTED AREA EXTERNALLY  2           Recent Labs   Lab Test 01/11/19  1021   WBC 4.2   HGB 10.8*        @labrcent[na,potassium,chloride,co2,bun,cr@  Recent Labs   Lab Test 01/11/19  1021   PROTTOTAL 7.2   ALBUMIN 3.8   BILITOTAL 1.0   AST 10   ALT 15   ALKPHOS 62         No results found for this or any previous visit (from the past 744 hour(s)).        Assessment/Plan  STage IIIa NSCLC - s/p resection and chemoradiation.  He is now 6 years out from completion of therapy.  Given his history of smoking, we have suggested doing yearly surveillance CT scans without contrast.  He will come back in one year with repeat scans.       I spent 25 minutes with the patient.  >50% of the time was spent in counseling and coordination of care.    Henry Graham   of Medicine  Division of Hematology, Oncology, and Transplantation  "

## 2020-01-14 NOTE — NURSING NOTE
"Oncology Rooming Note    January 14, 2020 3:04 PM   Stew Crooks is a 74 year old male who presents for:    Chief Complaint   Patient presents with     Oncology Clinic Visit     Return - Malignant neoplasm of upper lobe of right lung      Initial Vitals: /68   Pulse 92   Temp 98.4  F (36.9  C) (Oral)   Resp 16   Ht 1.753 m (5' 9.02\")   Wt 73.2 kg (161 lb 4.8 oz)   SpO2 94%   BMI 23.81 kg/m   Estimated body mass index is 23.81 kg/m  as calculated from the following:    Height as of this encounter: 1.753 m (5' 9.02\").    Weight as of this encounter: 73.2 kg (161 lb 4.8 oz). Body surface area is 1.89 meters squared.  No Pain (0) Comment: Data Unavailable   No LMP for male patient.  Allergies reviewed: Yes  Medications reviewed: Yes    Medications: Medication refills not needed today.  Pharmacy name entered into [employer]:    [id number] - [place], [place] - [address] AT [facility]  [id number] - [place] [address]    Clinical concerns: No new concerns today. Dr. Graham was notified.      EDWARD KAISER, ATTILA            "

## 2020-01-14 NOTE — LETTER
1/14/2020       RE: Stew Crooks  [address] [place] [address]     Dear Colleague,    Thank you for referring your patient, Stew Crooks, to the [facility]. Please see a copy of my visit note below.    REASON FOR VISIT:    CANCER STAGE: Cancer Staging  No matching staging information was found for the patient.    HISTORY OF PRESENT ILLNESS:  71-year-old non-English speaking Kyrgyz Male  -In [facility] was diagnosed with herpes zoster in 10/2013. Ever since that point he has continued to have a burning sensation and pain in his R arm which never improved. In [facility] he underwent MRI for further evaluation of a thoracic origin and was diagnosed with a R pancoast tumor. He then underwent a CT CAP which found a 5-cm mass in the right apex invading the T2-T3 vertebral bodies and probably ribs 2 and 3. This mass is abutting subclavian artery with no david invasion. He was told not much can be done for him so the patient and his wife traveled to [place] for further care.   - 3/31/2014 EUS, EBUS with FNA revealed adeno-squamous carcinoma. The 4R node was positive but not 4L or 7.   - EGFR mutations were negative  -MRI brain was negative  - 04/07/14 PET showed in addition to main mass, a hypermetabolic precarinal nodes which likely describes stations 4L and 4R, both enlarged with hypermetabolism with the addition of a nodular thickening along the anterior pleura measuring 1.3 x 0.8 cm with a max SUV of 7.1. There is no evidence of disease outside the chest.   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  - 6/2014 completed chemoradiation with cisplatin/Etoposide (6000 cGy in 30 fractions).  - 6/24/14 RIJ DVT associated with his port.   - Restaging scans showed a good response to treatment     - In Dec 2015 was noted to have new pancytopenia. Lab workup negative (hep B, C, HIV, CMV, TSH, Iron studies, ferritin, folic acid, Vitamin B12, retic, and blood  "smear). He had a bone marrow biopsy in Jan 2016. 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 Given 5 days of IVIG 2/8 to 2/12.      Pt is doing well. He was able tot ravel to [facility] last summer. He is otherwise without much complaints. He has had cough for the past few weeks, but was given doxycycline for presumed bronchitis and he is feeling better. He has been of doxy now for several days.  He has no fevers, chills or sweats.     Review Of Systems  10-point review of systems were negative except as noted in HPI.      EXAM:  Blood pressure 138/68, pulse 92, temperature 98.4  F (36.9  C), temperature source Oral, resp. rate 16, height 1.753 m (5' 9.02\"), weight 73.2 kg (161 lb 4.8 oz), SpO2 94 %.  I did not examine him today.     Current Outpatient Medications   Medication Sig Dispense Refill     Acetaminophen (TYLENOL PO) Take 500 mg by mouth       ALBUTEROL 108 (90 BASE) MCG/ACT inhaler INHALE 2 PUFFS BY MOUTH EVERY 4 HOURS AS NEEDED  3     DULERA 100-5 MCG/ACT oral inhaler INHALE 2 PUFFS BY MOUTH TWICE DAILY  3     selenium sulfide (SELSUN) 2.5 % external lotion 1 APPLICATION TO AFFECTED AREA EXTERNALLY  2           Recent Labs   Lab Test 01/11/19  1021   WBC 4.2   HGB 10.8*        @labrcent[na,potassium,chloride,co2,bun,cr@  Recent Labs   Lab Test 01/11/19  1021   PROTTOTAL 7.2   ALBUMIN 3.8   BILITOTAL 1.0   AST 10   ALT 15   ALKPHOS 62       No results found for this or any previous visit (from the past 744 hour(s)).    Assessment/Plan  STage IIIa NSCLC - s/p resection and chemoradiation.  He is now 6 years out from completion of therapy.  Given his history of smoking, we have suggested doing yearly surveillance CT scans without contrast.  He will come back in one year with repeat scans.     I spent 25 minutes with the patient.  >50% of the time was spent in counseling and coordination of " care.    Henry Graham   of Medicine  Division of Hematology, Oncology, and Transplantation

## 2020-02-04 NOTE — TELEPHONE ENCOUNTER
Spoke with Dr Graham. [facility] is not involved in management of Hgb, will defer to PCP/Pt preference. Called Pt son, Sunny, and relayed information. Pt verbalized understanding, said was just letting Dr Graham know as he had been advised to update any health changes. Thanked him for his call.

## 2020-03-10 NOTE — TELEPHONE ENCOUNTER
Oncology/Surgical Oncology Referral Request:     Specialty Requested: Medical Oncology - Hematology    Referring Provider: Henry Molina    Referring Clinic/Organization: [facility]    Records location: [facility]     Requested Provider (if specified): Not Specified        LVM and Letter Sent

## 2020-03-13 NOTE — TELEPHONE ENCOUNTER
ONCOLOGY INTAKE: Records Information      APPT INFORMATION: 3/19/20 - [facility]  Referring provider:  Santos  [facility]  Reason for visit/diagnosis:  unexplained anemia  Has patient been notified of appointment date and time?: Yes    RECORDS INFORMATION:  Were the records received with the referral (via [employer])? Internal referral    Has patient been seen for any external appt for this diagnosis? No    If yes, where? NA    Has patient had any imaging or procedures outside of [facility] for this condition? No      If Yes, where? NA    ADDITIONAL INFORMATION:  Scheduled via IB from [place]  I called pt's son Sunny to confirm

## 2020-03-16 NOTE — TELEPHONE ENCOUNTER
RECORDS STATUS - ALL OTHER DIAGNOSIS      RECORDS RECEIVED FROM: [facility] - Internal Referral   DATE RECEIVED: 3/16/20   NOTES STATUS DETAILS   OFFICE NOTE from referring provider Henry Pepper,    OFFICE NOTE from medical oncologist Nathalie Graham: 1/14/20   DISCHARGE SUMMARY from hospital [facility]    DISCHARGE REPORT from the ER     OPERATIVE REPORT     MEDICATION LIST [facility]    CLINICAL TRIAL TREATMENTS TO DATE     LABS     PATHOLOGY REPORTS     ANYTHING RELATED TO DIAGNOSIS Epic/CE 2/6/20   GENONOMIC TESTING     TYPE:     IMAGING (NEED IMAGES & REPORT)     CT SCANS PACS 1/14/20, 1/11/19, 1/16/18   MRI     MAMMO     ULTRASOUND     PET

## 2020-03-18 NOTE — TELEPHONE ENCOUNTER
L/M for Stew Jones's daughter-in-law re: Stew's new patient appointment with Dr. Pearce on 3/19. Looking to reschedule appt until June per Dr. Pearce's recommendation to delay d/t COVID-19 guidelines and patient's age and high risk. Explained that Sunny jones's phone number was out of order. Encouraged Karen, or a family member, to call to reschedule appt to June.     FARZAD De La FuenteN, RN  RN Care Coordinator  [facility]   Dr. Zoya Pearce

## 2020-03-19 PROBLEM — D64.9 HEMOGLOBIN LOW: Status: ACTIVE | Noted: 2020-01-01

## 2020-03-19 NOTE — LETTER
3/19/2020       RE: Stew Crooks  [address] [place] [address]     Dear Colleague,    Thank you for referring your patient, Stew Crooks, to the [facility]. Please see a copy of my visit note below.    [facility] Consult    Reasons for Consult: -anemia    History of Present Illness:     History taken from chart, and directly from patient with the help of a Armenian  on phone. Mr. Crooks is a 74 year old man with chronic anemia dating back to at least 2014.  At times his anemia has been quite severe.  He has a history of a right Pancoast tumor , adenosquamous carcinoma, and underwent chemoradiation with cis-platinum and etoposide, 6000 cGy in 30 fractions, completed June 2014.  He had follow-up with Dr. Jarocho Graham 1/14/2020- no evidence of recurrence    A bone marrow biopsy and aspirate done 1/8/2016 for pancytopenia showed Findings suspicious for myelodysplastic syndrome characterized by:  Marrow cellularity of 40% with erythroid predominant trilineage hematopoiesis, megakaryocyte dyspoiesis, and no increase in blasts.  Normal cytogenetics.    He was seen in the [facility] ED on 1/4/2024 bronchitis, no labs were drawn at this time.    He apparently has had labs drawn at his primary care physician ([facility]) and was anemic enough that he was set up for a 1 unit packed red cells on 2/7/2020.  Labs from 1/30/2020 hgb 6.9 (MCV 93 at that time).      Today he reports that his anemia with incidentally discovered. He had not noted any increased shortness of breath or loss of exercise tolerance. He does feel tiered at time.  He has not noticed any bleeding, but did undergo colonoscopy in the last 2 weeks to evaluate anemia.  He lives a fairly sedentary lifestyle.  Spends most of the day watching TV.  Does not walk more than very short distances.    He does think he has required transfusions in the past, but reports this was during a  parvovirus infection.       Denies fevers/chills.  Has some chronic cough.   No night sweats.  Has not noticed any changes in appetite or weight loss.    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  -COPD  -History of right IJ DVT associated with port June 2014  - History of pancytopenia December 2015 with negative evaluation except for bone marrow biopsy possibly suspicious for MDS, but normal cytogenetics  - Distant history of perforated ulcer requiring surgery.    Medications:  - Famotidine  - Albuterol and Dulera     Family History: con-contributory    Social History: smoking-40-pack-year history of smoking, quit 2011 alcohol- occasional.  Lives with wife who does not speak English.  Son speaks English.     Review of systems:  As in HPI.  The rest of the > 10 point review of systems was negative.    Vitals: 98.3/94/146/71/18/96%  Physical Exam:   GEN: Well nourished, well developed, appears stated age.  HEENT: no conjunctival injection, anicteric, Neck supple without meningismus, no anterior cervical or supraclavicular LAD  Ln: No cervical, supraclavicular or axillary adenopathy.   CV: RRR, Warm, well-perfused extremities, no edema  RESP: Coarse breath sounds bilaterally.  Right bases with some crackles. Occasional raspy cough.   GI: soft, non-tender, non-distended, no masses or organomegaly  MSK: No gross deformities appreciated, no peripheral edema  Skin: Warm, dry. No rashes/lesions  Neuro: Alert, CNs II-XII grossly intact. Sensation and motor function of extremities grossly intact.  No clonus or hypperreflexia  Psych: Appropriate mood and affect.    Labs:   From outside labs  3/18/2020  CBC  WBC: 3.2  Hgb: 7.4   Platelet: 374  MCV: 100  RDW: 23.9  Retic 2.2 (3/6)    2/3/2020   Iron: 113  Iron sat: 35.2  TIBC:  321  Transferrin: 226  Ferritin: 333  B12: 773  LDH:  224.  Haptoglobin: 129  TSH 4.20    SPEP 2/3/2020  Without M spike    UA:  2/19/2020  Negative for blood/RBC/bilirubin/urobilinogen 0.2    BmBx 1/8/2016:     - Findings suspicious for MDS     - Marrow cellularity 40% with erythroid predominant trilineage hematopoiesis, megakaryocyte dyspoiesis, and no increase in blasts       - Peripheral blood pancytopenia     Assessment and Recommendation:   74 year old male with history of adenosquamous carcinoma s/p chemo and radiation 2014 and no evidence of recurrent on recent scans who has suffered from chronic anemia since at least 2014 and recently required transfusion for incidentally discovered anemia.  There was some concern for MDS in the past based on BmBx.  Recent labs appear to be without iron deficiency and he has had recent normal colonoscopy and denies bleeding.  Also no evidence for hemolysis.  If MDS would suspect it is treatment related so would fall into high risk category.  However his functional status is somewhat difficult to parse would suspect ECOG 1-2 and I am not sure how well he would tolerate treatment.  Will eval for other possible causes and make sure he does not require a transfusion today. Consider next steps pending these results.    - Obtain today:  CBC w/ retic, LUZ MARINA, type and screen, LDH, SPEP, Iron panel, folate, peripheral smear, EPO  - Consider repeat BmBx pending above and further discussion with patient   Utility would be low if does not desire to treat or if risks would be greater than benefit    Will determine f/u based on labs. He has given permission for me to discuss with his son.     Bandar Dias MD  Internal Medicine PGY2  [id number]    Attending Note:  I have reviewed the patient chart, and interviewed and examined the patient.  I agree with the assessment and plan.  Zoya Pearce MD  Hematology      Again, thank you for allowing me to participate in the care of your patient.      Sincerely,    Zoya Pearce MD

## 2020-03-19 NOTE — NURSING NOTE
"Oncology Rooming Note    March 19, 2020 7:22 AM   Stew Crooks is a 74 year old male who presents for:    Chief Complaint   Patient presents with     Oncology Clinic Visit     Patient with lung cancer here for provider visit      Initial Vitals: BP (!) 146/71 (BP Location: Left arm, Patient Position: Sitting, Cuff Size: Adult Large)   Pulse 94   Temp 98.3  F (36.8  C) (Oral)   Resp 18   Ht 1.762 m (5' 9.37\")   Wt 74.1 kg (163 lb 4.8 oz)   SpO2 96%   BMI 23.86 kg/m   Estimated body mass index is 23.86 kg/m  as calculated from the following:    Height as of this encounter: 1.762 m (5' 9.37\").    Weight as of this encounter: 74.1 kg (163 lb 4.8 oz). Body surface area is 1.9 meters squared.  No Pain (0) Comment: Data Unavailable   No LMP for male patient.  Allergies reviewed: Yes  Medications reviewed: Yes    Medications: Medication refills not needed today.  Pharmacy name entered into [employer]: [id number] [address] [place] [address]    Clinical concerns: Patient reports cough for the past 3 months.  Patient denied traveling and fevers.          Cinthia Lowry CMA              "

## 2020-03-19 NOTE — NURSING NOTE
Patient labs drawn in clinic via venipuncture. Patient tolerated well. See flow sheet.    Elo Marin CMA ([facility])

## 2020-03-30 PROBLEM — D63.8 ANEMIA IN OTHER CHRONIC DISEASES CLASSIFIED ELSEWHERE: Status: ACTIVE | Noted: 2020-01-01

## 2020-03-30 NOTE — NURSING NOTE
Labs drawn today in clinic via venipuncture. Pt tolerated well. See Flowsheet      Tammie Rey CMA on 3/30/2020 at 10:26 AM

## 2020-03-30 NOTE — PROGRESS NOTES
Infusion Nursing Note:  Stew Crooks presents today for 1 units of PRBCs.    Patient seen by provider today: Yes: Cristine KHANNA   present during visit today: Yes, Language: Polish: ipad.     Note: Patient new to oncology infusion room and is receiving blood for the first time at this location. Pt oriented to infusion room and call light. New patient teaching done previously. Patient instructed to call triage with questions/concerns or if he/she has chills and/or temperature greater than or equal to 100.5. Triage number: [phone]; After hours, weekends, or holidays, call [phone] and ask for oncology doctor on call. Patient presents to chronic congested cough. Patient denies shortness of breath, chest pain, fever, or exacerbation of chronic cough symptoms. Patient denies pain and states no acute complaints or concerns needing to be addressed today. Bone marrow biopsy site c/d/i.    TORB. 3/30/2020. 1349. Cristine KHANNA. Merlin Kim RN. Give 1 unit of PRBC's today. Have patient come back in 1 week for labs and possible transfusion. In 2-3 weeks, patient to come back for labs and Dr. Pearce appointment.     Intravenous Access:  Peripheral IV placed via Vascular Access    Treatment Conditions:  Lab Results   Component Value Date    HGB 6.6 03/30/2020     Lab Results   Component Value Date    WBC 2.9 03/30/2020      Lab Results   Component Value Date    ANEU 2.4 03/30/2020     Lab Results   Component Value Date     03/30/2020      Lab Results   Component Value Date     03/30/2020                   Lab Results   Component Value Date    POTASSIUM 4.2 03/30/2020           Lab Results   Component Value Date    MAG 1.7 10/06/2014            Lab Results   Component Value Date    CR 1.11 03/30/2020                   Lab Results   Component Value Date    ALFREDO 8.7 03/30/2020                Lab Results   Component Value Date    BILITOTAL 0.9 03/30/2020           Lab Results   Component  Value Date    ALBUMIN 3.8 03/30/2020                    Lab Results   Component Value Date    ALT 17 03/30/2020           Lab Results   Component Value Date    AST 10 03/30/2020       Results reviewed, labs MET treatment parameters, ok to proceed with treatment. Hemoglobin less than 8 (6.6), therefore 1 unit of PRBCs given.   Blood Consent form signed 3/30/2020      Post Infusion Assessment:  Patient tolerated infusion without incident.  Blood return noted pre and post infusion.  Site patent and intact, free from redness, edema or discomfort.  No evidence of extravasations.  Access discontinued per protocol.       Discharge Plan:   Patient declined prescription refills.  Discharge instructions reviewed with: Patient.  Patient and/or family verbalized understanding of discharge instructions and all questions answered.  Copy of AVS reviewed with patient and/or family.  Patient will return 4/8/2020  for next appointment.  Patient discharged in stable condition accompanied by: self.  Departure Mode: Ambulatory.  Face to Face time: 0 minutes.    Merlin Kim RN                    ,

## 2020-03-30 NOTE — PROGRESS NOTES
"BMT ONC Adult Bone Marrow Biopsy Procedure Note  March 30, 2020  /52   Pulse 104   Temp 98.8  F (37.1  C) (Oral)   Resp 18   Ht 1.762 m (5' 9.37\")   Wt 74.7 kg (164 lb 11.2 oz)   SpO2 94%   BMI 24.06 kg/m       Learning needs assessment complete within 12 months? YES    DIAGNOSIS: anemia     PROCEDURE: Unilateral Bone Marrow Biopsy and Unilateral Aspirate    [place] [facility] [place]    Patient s identification was positively verified by verbal identification and invasive procedure safety checklist was completed. Informed consent was obtained. Following the administration of no premedications as pre-medication, patient was placed in the prone position and prepped and draped in a sterile manner. Approximately 15 cc of 1% Lidocaine was used over the left posterior iliac spine. Following this a 3 mm incision was made. Trephine bone marrow core(s) was (were) obtained from the [facility]. Bone marrow aspirates were obtained from the [facility]. Aspirates were sent for morphology, immunophenotyping, cytogenetics and molecular diagnostics. A total of approximately 25 ml of marrow was aspirated. Following this procedure a sterile dressing was applied to the bone marrow biopsy site(s). The patient was placed in the supine position to maintain pressure on the biopsy site. Post-procedure wound care instructions were given.     Complications: YES, soft marrow, first attempt at the aspirate showed a diluted sample, thus was attempted again with better sample.     Pre-procedural pain: 0 out of 10 on the numeric pain rating scale.     Procedural pain: 4 out of 10 on the numeric pain rating scale.     Post-procedural pain assessment: 1 out of 10 on the numeric pain rating scale.     Interventions: NO    Length of procedure:21 minutes to 45 minutes    Procedure performed by: Hermila Roman PA-C    1. Patient had worsening anemia today, endorsed fatigue, but no dizziness or SOB.  Decided on 1 unit of PRBC today and will follow up with " Dr Pearce next week with labs and possible PRBC  2. Patient had an active, productive cough today.  Son felt it was from his COPD and Remus felt it was stable.  However due to the language barrier and the active cough.  Full Covid precautions were given. No fevers, ongoing SHORE from COPD.

## 2020-03-30 NOTE — NURSING NOTE
"Oncology Rooming Note    March 30, 2020 9:54 AM   Stew Crooks is a 75 year old male who presents for:    Chief Complaint   Patient presents with     Oncology Clinic Visit     BMBX     Initial Vitals: BP (!) 155/70   Pulse 98   Temp 98.8  F (37.1  C) (Oral)   Resp 18   Ht 1.762 m (5' 9.37\")   Wt 74.7 kg (164 lb 11.2 oz)   SpO2 98%   BMI 24.06 kg/m   Estimated body mass index is 24.06 kg/m  as calculated from the following:    Height as of this encounter: 1.762 m (5' 9.37\").    Weight as of this encounter: 74.7 kg (164 lb 11.2 oz). Body surface area is 1.91 meters squared.  No Pain (0) Comment: Data Unavailable   No LMP for male patient.  Allergies reviewed: Yes  Medications reviewed: Yes    Medications: Medication refills not needed today.  Pharmacy name entered into [employer] [id number] [id number] [address]    Clinical concerns:JUAN Rey CMA          '    "

## 2020-03-30 NOTE — NURSING NOTE
Post BMBX vitals done. Assessed biopsy site patient then walked over to infusion suite 2 Garrett FERNANDO Gaitan

## 2020-03-30 NOTE — PATIENT INSTRUCTIONS
[facility] Triage and after hours / weekends / holidays:  [phone]    Please call the triage or after hours line if you experience a temperature greater than or equal to 100.5, shaking chills, have uncontrolled nausea, vomiting and/or diarrhea, dizziness, shortness of breath, chest pain, bleeding, unexplained bruising, or if you have any other new/concerning symptoms, questions or concerns.      If you are having any concerning symptoms or wish to speak to a provider before your next infusion visit, please call your care coordinator or triage to notify them so we can adequately serve you.     If you need a refill on a narcotic prescription or other medication, please call before your infusion appointment.                 March 2020 Sunday Monday Tuesday Wednesday Thursday Friday Saturday   1     2     3     4     5     6     7       8     9     10     11     12     13     14       15     16     17     18     19    P NEW   6:45 AM   (75 min.)   Zoya Pearce MD   [facility] 20 21 22  Happy Birthday!     23     24     25     26     27     28       29     30    [facility] LAB DRAW   9:45 AM   (15 min.)   [facility] LAB DRAW   [facility] Lab Draw    [facility] BONE MARROW BIOPSY  10:05 AM   (90 min.)   Cristine Roman PA-C   [facility] ONC INFUSION 180   2:00 PM   (180 min.)    ONCOLOGY INFUSION   [facility] 31 April 2020 Sunday Monday Tuesday Wednesday Thursday Friday Saturday                  1     2     3     4       5     6     7     8     9     10     11       12     13     14     15     16     17     18       19     20     21     22     23     24     25       26     27     28     29     30                               Lab Results:  Recent Results (from the past 12 hour(s))   Fibrinogen activity    Collection Time: 03/30/20 10:16 AM   Result Value Ref Range    Fibrinogen 413  200 - 420 mg/dL   Partial thromboplastin time    Collection Time: 03/30/20 10:16 AM   Result Value Ref Range    PTT 33 22 - 37 sec   INR    Collection Time: 03/30/20 10:16 AM   Result Value Ref Range    INR 1.17 (H) 0.86 - 1.14   Uric acid    Collection Time: 03/30/20 10:16 AM   Result Value Ref Range    Uric Acid 5.7 3.5 - 7.2 mg/dL   Lactate Dehydrogenase    Collection Time: 03/30/20 10:16 AM   Result Value Ref Range    Lactate Dehydrogenase 221 85 - 227 U/L   Comprehensive metabolic panel    Collection Time: 03/30/20 10:16 AM   Result Value Ref Range    Sodium 139 133 - 144 mmol/L    Potassium 4.2 3.4 - 5.3 mmol/L    Chloride 109 94 - 109 mmol/L    Carbon Dioxide 23 20 - 32 mmol/L    Anion Gap 7 3 - 14 mmol/L    Glucose 136 (H) 70 - 99 mg/dL    Urea Nitrogen 23 7 - 30 mg/dL    Creatinine 1.11 0.66 - 1.25 mg/dL    GFR Estimate 65 >60 mL/min/[1.73_m2]    GFR Estimate If Black 75 >60 mL/min/[1.73_m2]    Calcium 8.7 8.5 - 10.1 mg/dL    Bilirubin Total 0.9 0.2 - 1.3 mg/dL    Albumin 3.8 3.4 - 5.0 g/dL    Protein Total 7.2 6.8 - 8.8 g/dL    Alkaline Phosphatase 76 40 - 150 U/L    ALT 17 0 - 70 U/L    AST 10 0 - 45 U/L   CBC with platelets differential    Collection Time: 03/30/20 10:16 AM   Result Value Ref Range    WBC 2.9 (L) 4.0 - 11.0 10e9/L    RBC Count 2.07 (L) 4.4 - 5.9 10e12/L    Hemoglobin 6.6 (LL) 13.3 - 17.7 g/dL    Hematocrit 20.9 (L) 40.0 - 53.0 %     (H) 78 - 100 fl    MCH 31.9 26.5 - 33.0 pg    MCHC 31.6 31.5 - 36.5 g/dL    RDW 22.5 (H) 10.0 - 15.0 %    Platelet Count 250 150 - 450 10e9/L    Diff Method Manual Differential     % Neutrophils 81.2 %    % Lymphocytes 12.5 %    % Monocytes 0.0 %    % Eosinophils 0.9 %    % Basophils 1.8 %    % Blasts 3.6 %    Nucleated RBCs 4 (H) 0 /100    Absolute Neutrophil 2.4 1.6 - 8.3 10e9/L    Absolute Lymphocytes 0.4 (L) 0.8 - 5.3 10e9/L    Absolute Monocytes 0.0 0.0 - 1.3 10e9/L    Absolute Eosinophils 0.0 0.0 - 0.7 10e9/L    Absolute Basophils 0.1 0.0 - 0.2  10e9/L    Absolute Blasts 0.1 (H) 0 10e9/L    Absolute Nucleated RBC 0.1     Anisocytosis Moderate     Poikilocytosis Moderate     RBC Fragments Slight     Teardrop Cells Slight     Acanthocytes Slight     Ovalocytes Moderate     Macrocytes Present     Platelet Estimate Confirming automated cell count    ABO/Rh type and screen    Collection Time: 03/30/20 10:17 AM   Result Value Ref Range    Units Ordered 1     ABO O     RH(D) Pos     Antibody Screen Neg     Test Valid Only At          [facility]    Specimen Expires 04/02/2020     Crossmatch Red Blood Cells    Blood component    Collection Time: 03/30/20 10:17 AM   Result Value Ref Range    Unit Number [id number]     Blood Component Type Red Blood Cells Leukocyte Reduced     Division Number 00     Status of Unit Released to care unit 03/30/2020 1347     Blood Product Code [id number]     Unit Status ISS

## 2020-03-30 NOTE — LETTER
"3/30/2020       RE: Stew Crooks  [address] [facility] [address]     Dear Colleague,    Thank you for referring your patient, Stew Crooks, to the [facility]. Please see a copy of my visit note below.    BMT ONC Adult Bone Marrow Biopsy Procedure Note  March 30, 2020  /52   Pulse 104   Temp 98.8  F (37.1  C) (Oral)   Resp 18   Ht 1.762 m (5' 9.37\")   Wt 74.7 kg (164 lb 11.2 oz)   SpO2 94%   BMI 24.06 kg/m       Learning needs assessment complete within 12 months? YES    DIAGNOSIS: anemia     PROCEDURE: Unilateral Bone Marrow Biopsy and Unilateral Aspirate    LOCATION: [facility] [place] 2nd [facility]    Patient s identification was positively verified by verbal identification and invasive procedure safety checklist was completed. Informed consent was obtained. Following the administration of no premedications as pre-medication, patient was placed in the prone position and prepped and draped in a sterile manner. Approximately 15 cc of 1% Lidocaine was used over the left posterior iliac spine. Following this a 3 mm incision was made. Trephine bone marrow core(s) was (were) obtained from the [facility]. Bone marrow aspirates were obtained from the [facility]. Aspirates were sent for morphology, immunophenotyping, cytogenetics and molecular diagnostics. A total of approximately 25 ml of marrow was aspirated. Following this procedure a sterile dressing was applied to the bone marrow biopsy site(s). The patient was placed in the supine position to maintain pressure on the biopsy site. Post-procedure wound care instructions were given.     Complications: YES, soft marrow, first attempt at the aspirate showed a diluted sample, thus was attempted again with better sample.     Pre-procedural pain: 0 out of 10 on the numeric pain rating scale.     Procedural pain: 4 out of 10 on the numeric pain rating scale.     Post-procedural pain assessment: 1 out of 10 on the numeric pain rating scale.     Interventions: NO    Length " of procedure:21 minutes to 45 minutes    Procedure performed by: Hermila Romna PA-C    1. Patient had worsening anemia today, endorsed fatigue, but no dizziness or SOB.  Decided on 1 unit of PRBC today and will follow up with Dr Pearce next week with labs and possible PRBC  2. Patient had an active, productive cough today.  Son felt it was from his COPD and Remus felt it was stable.  However due to the language barrier and the active cough.  Full Covid precautions were given. No fevers, ongoing SHORE from COPD.     Again, thank you for allowing me to participate in the care of your patient.      Sincerely,    Cristine Roman PA-C

## 2020-04-08 PROBLEM — D46.9 MDS (MYELODYSPLASTIC SYNDROME) (H): Chronic | Status: ACTIVE | Noted: 2020-01-01

## 2020-04-08 NOTE — NURSING NOTE
Chief Complaint   Patient presents with     Blood Draw     labs drawn via PIV placed by RN in lab     /68 (BP Location: Left arm, Patient Position: Chair, Cuff Size: Adult Regular)   Pulse 90   Temp 97.4  F (36.3  C) (Oral)   Resp 18   Wt 71.8 kg (158 lb 6.4 oz)   SpO2 98%   BMI 23.14 kg/m      PIV placed right lower forearm by RN in lab for infusion and labs. Labs drawn and sent. Pt tolerated well.   Pt checked in for next appointment.    Mary Melendez RN

## 2020-04-08 NOTE — PROGRESS NOTES
This visit was converted into an in person visit while patient was at the infusion center receiving a blood transfusion.  A telephone Nam[place]ian  was used.    Stew Crooks complains of    Chief Complaint   Patient presents with     Telephone     Anemia. Lung cancer       Allergies reviewed: Yes  Medications reviewed: Yes    Medications: Medication refills not needed today.  Pharmacy name entered into [employer]: [id number] [address] [place] [id number]      Clinical concerns: No new concerns.     Brandon Engle, LPN, LPN April 8, 2020  10:45 AM     ALLERGIES  Patient has no known allergies.      PROBLEM LIST:  1. Myelodysplastic syndrome diagnosed 3/30/20.  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  No FLT3 ITD, but not enough for D835 analysis  - History of pancytopenia December 2015 with negative evaluation except for bone marrow biopsy possibly suspicious for MDS, but normal cytogenetics    2.  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 Received chemoradiation with cisplatin/Etoposide 4/14/14-5/19/14.  6000 cGy in 30 fractions completed 6/9/14 . Difficult course with hospitalizations for neutropenic fevers esophagitis.   3. COPD  4. History of right IJ DVT associated with port June 2014    5. Distant history of perforated ulcer requiring surgery.    Interim History: Mr. Crooks is feeling okay.  He complains of fatigue.  He also has a chronic cough.  He denies any significant pain or bleeding at the bone marrow biopsy site.  He reports that there has not been any difficulty placing IVs for him.  He did have a port long ago when he was treated for the lung cancer.      PHYSICAL EXAMINATION:  /68, Pulse 90, RR 18, T 97.9  O2 sat 95% room air, Wt 71.85 kg    General appearance:  Patient is elderly man in no acute distress, occasionally coughing.     HEENT:  No pallor,  icterus.   Extremities:  No ankle edema.     L posterior iliac crest- compresson bandage on- no tenderness to palpation, no surrounding erythema, he did not want me to remove the bandage, prefers to remove at home..   Skin:  No rash, no petechiae or ecchymoses on face, forearms.     Labs:  Results for ASHELY LOPEZ (MRN [id number]) as of 4/8/2020 15:51   Ref. Range 4/8/2020 08:17   Sodium Latest Ref Range: 133 - 144 mmol/L 140   Potassium Latest Ref Range: 3.4 - 5.3 mmol/L 4.2   Chloride Latest Ref Range: 94 - 109 mmol/L 111 (H)   Carbon Dioxide Latest Ref Range: 20 - 32 mmol/L 22   Urea Nitrogen Latest Ref Range: 7 - 30 mg/dL 20   Creatinine Latest Ref Range: 0.66 - 1.25 mg/dL 1.08   GFR Estimate Latest Ref Range: >60 mL/min/1.73_m2 67   GFR Estimate If Black Latest Ref Range: >60 mL/min/1.73_m2 77   Calcium Latest Ref Range: 8.5 - 10.1 mg/dL 8.4 (L)   Anion Gap Latest Ref Range: 3 - 14 mmol/L 6   Albumin Latest Ref Range: 3.4 - 5.0 g/dL 3.5   Protein Total Latest Ref Range: 6.8 - 8.8 g/dL 7.1   Bilirubin Total Latest Ref Range: 0.2 - 1.3 mg/dL 0.8   Alkaline Phosphatase Latest Ref Range: 40 - 150 U/L 84   ALT Latest Ref Range: 0 - 70 U/L 19   AST Latest Ref Range: 0 - 45 U/L 11   Lactate Dehydrogenase Latest Ref Range: 85 - 227 U/L 256 (H)   Glucose Latest Ref Range: 70 - 99 mg/dL 143 (H)   WBC Latest Ref Range: 4.0 - 11.0 10e9/L 2.4 (L)   Hemoglobin Latest Ref Range: 13.3 - 17.7 g/dL 7.7 (L)   Hematocrit Latest Ref Range: 40.0 - 53.0 % 23.4 (L)   Platelet Count Latest Ref Range: 150 - 450 10e9/L 207   RBC Count Latest Ref Range: 4.4 - 5.9 10e12/L 2.40 (L)   MCV Latest Ref Range: 78 - 100 fl 98   MCH Latest Ref Range: 26.5 - 33.0 pg 32.1   MCHC Latest Ref Range: 31.5 - 36.5 g/dL 32.9   RDW Latest Ref Range: 10.0 - 15.0 % 19.3 (H)   Diff Method Unknown Manual Differential   % Neutrophils Latest Units: % 67.2   % Lymphocytes Latest Units: % 27.4   % Monocytes Latest Units: % 0.0   % Eosinophils Latest Units: % 0.0    % Basophils Latest Units: % 2.7   % Myelocytes Latest Units: % 0.9   % Blasts Latest Units: % 1.8   Absolute Neutrophil Latest Ref Range: 1.6 - 8.3 10e9/L 1.6   Absolute Lymphocytes Latest Ref Range: 0.8 - 5.3 10e9/L 0.7 (L)   Absolute Monocytes Latest Ref Range: 0.0 - 1.3 10e9/L 0.0   Absolute Eosinophils Latest Ref Range: 0.0 - 0.7 10e9/L 0.0   Absolute Basophils Latest Ref Range: 0.0 - 0.2 10e9/L 0.1   Absolute Myelocytes Latest Ref Range: 0 10e9/L 0.0     Patient Name: ASHELY LOPEZ   MR#: [id number]   Specimen #: [id number]   Collected: 3/30/2020   Received: 3/30/2020   Reported: 4/3/2020 17:43   Ordering Phy(s): COURTNEY MICHELLE   Additional Phy(s): Copy to Cytogenetics     For improved result formatting, select 'View Enhanced Report Format' under    Linked Documents section.     +++ORIGINAL REPORT FOLLOWS ADDENDUM+++   ADDENDUM     TO ORIGINAL REPORT   Status: Signed Out   Date Ordered:4/6/2020   Date Complete:4/6/2020   Date Reported:4/6/2020 11:49   Signed Out By: Abilio Ellis M.D.,[facility]     INTERPRETATION:   See comments     COMMENTS:   The bone marrow biopsy core sections contain a few reactive appearing   lymphoid aggregates that are well   circumscribed, composed of small mature lymphocytes, and predominately   non-paratrabecular.     CD3 and CD20 by IHC were performed.  CD3 highlights scattered T cells.     The lymphoid aggregates contain a   mixture of T and B cells, respectively (based on CD3 and CD20).  The   further supports the impression of   benign/reactive lymphoid aggregates.     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     This case was reviewed in part at our Hematopathology Faculty Consensus   conference at which Ale Flood and   Boyd were present in addition to myself.     Based on the morphology and history, the favored diagnosis is therapy   related myeloid neoplasm.  Correlation   with pending cytogenetics is required for final classification.     There are a few scattered lymphoid aggregates composed of small   lymphocytes - I have ordered IHC to further   characterize them and will report the results in an addendum.     I have personally reviewed all specimens and/or slides, including the   listed special stains, and used them   with my medical judgment to determine the final diagnosis.     Electronically signed out by:   Abilio Ellis M.D.,[facility]     Asessment and recommendation:   Myelodysplastic syndrome with 5.6% blasts.  This is possibly related to the chemotherapy he received in 2014 for the lung cancer.  However, he had a suggestion of some dysplasia on a bone marrow biopsy in 2015, and I would think a treatment related MDS would have progressed much faster.  His IPSS-R score is intermediate.  I discussed with him that this is treatable with chemotherapy, azacitidine, and that the goal of treatment would be to make him transfusion independent and to prolong his life.  It would not cure the MDS.  Without any treatment, the median life expectancy is about 3 years with a 25% risk of  becoming acute myelogenous leukemia.  He would also need to continue with transfusions.  His EPO level was 1200, so unlikely to respond to erythropoietin agents.    I discussed with him that azacitidine is given subcutaneous once a day for 5 days every 4-5 weeks.  The main risk factor for it is that the blood counts will all drop low and he will be at risk for infection, thrombocytopenia and anemia, and transfusions may be needed. Antibiotics may be needed if there is a fever >100.5 or other signs of infection.  Sometimes adjustments in the dose are needed if the white blood cell count and platelet counts drop too low.  Mild nausea that may occur.  We give anti-nausea medications with it.  There can be some irritation at the subcutaneous infusion site.  It takes at least 2 cycles and possibly up to 5 cycles to see a response.  I indicated that I have given this medication to many people in their 70s and 80s and it is overall well-tolerated.  It would not be nearly as difficult to take as the cis-platinum, etoposide, radiation that she received in the past for the lung cancer.  The azacitidine is given as long as he was responding to it.  And of course if he is not tolerating it when we started, adjustments could be made to the dose or we can simply stop it.  My overall recommendation is that it is worth trying the azacitidine and attempt to decrease in his need for transfusion and to improve his quality of life.    I also discussed the issue of a port with him.  It would save him from having to have blood draws and IVs placed for transfusions.  At the moment, he is not interested in having a port placed.  However I think this should be readdressed, because whether he receives a azacitidine or decides to just continue on transfusions, it is going to be easier for him to have a port.  Of course if he decides to just go on hospice with no supportive transfusions that would be a different matter.    I discussed the same  issues with his son by phone call.    Mr. Duarte is says he needs to think about it.  We did not get into a hospice discussion.  He has an appointment next week, and for now I will keep that appointment scheduled for additional discussion    I spent 45 minutes with the patient , of which >50 % was in counseling.    Zoya Pearce MD  Hematology    Addendum:  I spoke by phone with Sunny Crooks, patient's son.  Stew has decided to go forward with the azacitidine.  Will arrange to star April 20, assuming that works out for our infusion center. Cancel the appt with me on 4/15.  Reschedule f/u with Portia on May 7, and then second cycle  of chemotherapy 5/18.   Zoya Pearce MD  Hematology

## 2020-04-08 NOTE — PATIENT INSTRUCTIONS
Contact Numbers    [facility] Main Line (for Scheduling/Triage/After Hours Nurse Line): [phone]    Please call the [facility] nurse triage or the after hours nurse line if you experience a temperature greater than or equal to 100.5, shaking chills, have uncontrolled nausea, vomiting and/or diarrhea, dizziness, lightheadedness, shortness of breath, chest pain, bleeding, unexplained bruising, or if you have any other new/concerning symptoms, questions or concerns.     If you are having any concerning symptoms or wish to speak to a provider before your next infusion visit, please call your care coordinator or triage to notify them so we can adequately serve you.     If you need a refill on a narcotic prescription or other medication, please call triage before your infusion appointment.       April 2020 Sunday Monday Tuesday Wednesday Thursday Friday Saturday                  1     2     3     4       5     6     7     8    [facility]   8:15 AM   (15 min.)    [facility]   [facility] Lab Draw    [facility] INFUSION 360   9:00 AM   (360 min.)    ONCOLOGY INFUSION   [facility]    TELEPHONE VISIT  11:00 AM   (30 min.)   Zoya Pearce MD   [facility] 9     10     11       12     13     14     15    [facility]   6:30 AM   (15 min.)    [facility] D[facility]   [facility] Lab Draw    [facility] RETURN   6:45 AM   (30 min.)   Zoya Pearce MD   [facility] 16     17     18       19     20     21     22     23     24     25       26     27     28     29     30                           May 2020      [place] Monday Tuesday Wednesday Thursday Friday Saturday                            1     2       3     4     5     6     7     8     9       10     11     12     13     14     15     16       17     18     19     20     21     22     23       24     25     26     27     28     29     30       31                                                    Lab Results:  Recent Results (from the past 12 hour(s))   Lactate Dehydrogenase    Collection Time: 04/08/20  8:17 AM   Result Value Ref Range    Lactate Dehydrogenase 256 (H) 85 - 227 U/L   Comprehensive metabolic panel    Collection Time: 04/08/20  8:17 AM   Result Value Ref Range    Sodium 140 133 - 144 mmol/L    Potassium 4.2 3.4 - 5.3 mmol/L    Chloride 111 (H) 94 - 109 mmol/L    Carbon Dioxide 22 20 - 32 mmol/L    Anion Gap 6 3 - 14 mmol/L    Glucose 143 (H) 70 - 99 mg/dL    Urea Nitrogen 20 7 - 30 mg/dL    Creatinine 1.08 0.66 - 1.25 mg/dL    GFR Estimate 67 >60 mL/min/[1.73_m2]    GFR Estimate If Black 77 >60 mL/min/[1.73_m2]    Calcium 8.4 (L) 8.5 - 10.1 mg/dL    Bilirubin Total 0.8 0.2 - 1.3 mg/dL    Albumin 3.5 3.4 - 5.0 g/dL    Protein Total 7.1 6.8 - 8.8 g/dL    Alkaline Phosphatase 84 40 - 150 U/L    ALT 19 0 - 70 U/L    AST 11 0 - 45 U/L   CBC with platelets differential    Collection Time: 04/08/20  8:17 AM   Result Value Ref Range    WBC 2.4 (L) 4.0 - 11.0 10e9/L    RBC Count 2.40 (L) 4.4 - 5.9 10e12/L    Hemoglobin 7.7 (L) 13.3 - 17.7 g/dL    Hematocrit 23.4 (L) 40.0 - 53.0 %    MCV 98 78 - 100 fl    MCH 32.1 26.5 - 33.0 pg    MCHC 32.9 31.5 - 36.5 g/dL    RDW 19.3 (H) 10.0 - 15.0 %    Platelet Count 207 150 - 450 10e9/L    Diff Method Manual Differential     % Neutrophils 67.2 %    % Lymphocytes 27.4 %    % Monocytes 0.0 %    % Eosinophils 0.0 %    % Basophils 2.7 %    % Myelocytes 0.9 %    % Blasts 1.8 %    Absolute Neutrophil 1.6 1.6 - 8.3 10e9/L    Absolute Lymphocytes 0.7 (L) 0.8 - 5.3 10e9/L    Absolute Monocytes 0.0 0.0 - 1.3 10e9/L    Absolute Eosinophils 0.0 0.0 - 0.7 10e9/L    Absolute Basophils 0.1 0.0 - 0.2 10e9/L    Absolute Myelocytes 0.0 0 10e9/L    Absolute Blasts 0.0 0 10e9/L    Anisocytosis Slight     Poikilocytosis Moderate     RBC Fragments Slight     Teardrop Cells Slight     Acanthocytes Slight     Ovalocytes Moderate     Platelet Estimate Confirming automated cell  count

## 2020-04-08 NOTE — PROGRESS NOTES
Infusion Nursing Note:  Stew Crooks presents today for 1 unit PRBC.    Patient seen by provider today: No   present during visit today: No, Interpretors currently not allowed in infusion room. Urdu interpretor utilized via telephone.     Note: Patient notes no new concerns today. Continues to have chronic cough, which has not changed. Confirmed with JEY Mcclendon that pt should receive 1 unit PRBC today, despite the blood shortage, due to increased age, COPD and overall clinical picture.     Pt tolerated blood transfusion without incident until the end of the transfusion, when he developed significant red hives on his face, dizziness and hypertension. Blood bag and tubing removed and taken to blood bank and pt given Benadryl, Solumedrol and Pepcid. Pt hypertensive and temperature raised to 99.6, otherwise vital signs normal.    Pt continued to be monitored. Hives and dizziness slowly improved after medications and BP improved as well. Dr. Pearce and blood bank notified. Pt continued to be monitored for over 60 minutes until complete resolution of symptoms. Pt felt comfortable discharging home. Vitals back to baseline.     Intravenous Access:  Peripheral IV placed.    Treatment Conditions:  Lab Results   Component Value Date    HGB 7.7 04/08/2020     Lab Results   Component Value Date    WBC 2.4 04/08/2020      Lab Results   Component Value Date    ANEU 2.4 03/30/2020     Lab Results   Component Value Date     04/08/2020      Lab Results   Component Value Date     04/08/2020                   Lab Results   Component Value Date    POTASSIUM 4.2 04/08/2020           Lab Results   Component Value Date    MAG 1.7 10/06/2014            Lab Results   Component Value Date    CR 1.08 04/08/2020                   Lab Results   Component Value Date    ALFREDO 8.4 04/08/2020                Lab Results   Component Value Date    BILITOTAL 0.8 04/08/2020           Lab Results   Component Value Date     ALBUMIN 3.5 04/08/2020                    Lab Results   Component Value Date    ALT 19 04/08/2020           Lab Results   Component Value Date    AST 11 04/08/2020     Results reviewed, labs MET treatment parameters, ok to proceed with treatment.  Blood transfusion consent signed 3/30/20.    Post Infusion Assessment:  Patient tolerated infusion poorly due to : Hypersensitivity: Did patient have a hypersensitivity reaction? : Yes  Drug or Product name: PRBC  Were pre-meds administered?: No     First or Subsequent treatment: Subsequent infusion  Rate of infusion when patient had hypersensitivity reaction: 270  Time the hypersensitivity reaction was first recognized: 1300  Symptoms observed or reported (select all that apply): Erythema;Hives;Hypertension;Itching;Rigors;Other: (Comment)(dizziness)  Interventions/treatment following reaction: Infusion stopped;Hypersensitivity medications administered;Therapy discontinued;Additional observation period added  What hypersensitivity medications were administered?: DiphendydrAMINE (benadryl);Methylprednisolone;NaCl 0.9% Bolus;Famotidine(Pepcid)  Name of provider notified: Dr. Pearce  Time provider notified: 2665  Type of notification (select all that apply): Paged/Phone  Was the patient re-challenged today?: No - no re-challenge today (pt had already finished transfusion).    Blood return noted pre and post infusion.  Site patent and intact, free from redness, edema or discomfort.  No evidence of extravasations.  Access discontinued per protocol.     Discharge Plan:   Patient declined prescription refills.  Copy of AVS reviewed with patient and/or family.  Patient will return 4/15 for next appointment.  Patient discharged in stable condition accompanied by: self.  Departure Mode: Ambulatory.    Jillian Beach RN

## 2020-04-09 NOTE — CONSULTS
Laboratory Medicine and Pathology  Transfusion Medicine- Transfusion Reaction    Stew Crooks MRN# [id number]   YOB: 1945 Age: 75 year old   Date of Reaction: 4/8/2020       Transfusion Reaction Evaluation   Impression  In this 75 year old male who experienced urticaria, pruritis, hypertension, dizziness, and rigor after transfusion of a unit of packed red blood cells, the patient s constellation of symptoms are consistent both with an allergic transfusion reaction as well as a febrile non-hemolytic transfusion reaction (FNHTR), with rigors being the only symptom qualifying him for this diagnosis.  Because the reaction occurred during an outpatient visit scheduled specifically for transfusion of blood products, and because there were no new/changed medications or other etiologies for such a reaction, the transfusion is the most likely etiology in this case.  Although the presence of rigors is consistent with a definitive FNHTR, it was explained to blood bank staff that it was a very mild and short-lived symptom.  Thus, the overarching explanation for the symptoms in this case is a non-severe allergic transfusion reaction.  Nonetheless, a culture of the implicated unit was initiated.      Recommendation    Transfuse as needed.  If positive, final culture results will be called to the clinical team and reported in an addendum.     ----------------------------------    History  Stew Crooks is a 75 year old male with recently diagnosed myelodysplastic syndrome.      Reported Symptoms  Transfusion of a unit of packed red blood cells was started at 11:43 and finished at 13:00.  At the end of the transfusion, per RN clinical notes, the patient developed significant red hives on his face, and he experienced dizziness, hypertension, pruritis, and rigor.  An increase in temperature was not reported.      Blood Bank Investigation  Product Type: packed red blood cells  Unit Number: [id number]  Amount Remaining:  0 mL  Post-Transfusion Clerical Check: Correct  ABO/Rh: The unit type was O pos and the patient was O pos. The unit was compatible.  LUZ MARINA: negative  Post-Transfusion Plasma: straw-colored    Gram stain showed no organisms and culture is no growth to date, pending final.    [facility] Hemovigilance  Case Definition: definitive for both a febrile non-hemolytic transfusion reaction (FNHTR) and allergic transfusion reaction  Severity: Non-severe  Imputability: Definite (allergic transfusion reaction) and probable (FNHTR)      Melissa Villarreal MD  Laboratory Medicine and Pathology Resident, PGY-3  [phone]      I, Shad Quach MD, have reviewed the patient's records and data. I have discussed this case with the pathology resident, Melissa Villarreal MD.  I have edited this note, and the findings and recommendations documented in the note reflect my medical assessment of the reported transfusion reaction.   This meets the definition of both minor allergic transfusion reaction and a febrile nonhemolytic transfusion reaction, definite imputability for the allergic reaction, and only probable imputability for the febrile reaction.  Final culture results are pending at this time.  If cultures indicate possible bacterial contamination these results will be called to the clinical team.   Recommendation: Transfuse as needed.    Shad Quach MD  Transfusion Medicine Attending  Laboratory Medicine & Pathology  Pager: [phone]

## 2020-04-20 NOTE — NURSING NOTE
Chief Complaint   Patient presents with     Blood Draw     Labs drawn via  by RN in lab. VS taken.      Labs drawn via venipuncture. Vital signs taken. Checked into next appointment.   Brittany Perez RN

## 2020-04-20 NOTE — PATIENT INSTRUCTIONS
[facility] Triage and after hours / weekends / holidays:  [phone]    Please call the triage or after hours line if you experience a temperature greater than or equal to 100.5, shaking chills, have uncontrolled nausea, vomiting and/or diarrhea, dizziness, shortness of breath, chest pain, bleeding, unexplained bruising, or if you have any other new/concerning symptoms, questions or concerns.      If you are having any concerning symptoms or wish to speak to a provider before your next infusion visit, please call your care coordinator or triage to notify them so we can adequately serve you.     If you need a refill on a narcotic prescription or other medication, please call before your infusion appointment.

## 2020-04-20 NOTE — PROGRESS NOTES
Infusion Nursing Note:  Stew Crooks presents today for Cycle 1 Day 1 Vidaza.    Visit was conducted today with a Japanese  over ipad. Pt's son on phone for most of visit.     Note: Vidaza divided into two injections and given to LLQ of abdomen without incident.    Today pt's hgb is 7.2. Pt reports feeling fatigued but denies SOB and dizziness.  Plan for pt to have one unit of blood when he returns tomorrow for day 2.  Pt reports having a reaction to blood products on 4/8/2020.  This RN sent a message to Dr Pearce to see if she wanted to add premedications prior to transfusion tomorrow.         Treatment Conditions:  Lab Results   Component Value Date    HGB 7.2 04/20/2020     Lab Results   Component Value Date    WBC 2.2 04/20/2020      Lab Results   Component Value Date    ANEU 1.3 04/20/2020     Lab Results   Component Value Date     04/20/2020      Lab Results   Component Value Date     04/20/2020                   Lab Results   Component Value Date    POTASSIUM 4.4 04/20/2020           Lab Results   Component Value Date    MAG 1.7 10/06/2014            Lab Results   Component Value Date    CR 1.20 04/20/2020                   Lab Results   Component Value Date    ALFREDO 8.6 04/20/2020                Lab Results   Component Value Date    BILITOTAL 0.8 04/08/2020           Lab Results   Component Value Date    ALBUMIN 3.5 04/08/2020                    Lab Results   Component Value Date    ALT 19 04/08/2020           Lab Results   Component Value Date    AST 11 04/08/2020       Results reviewed, labs MET treatment parameters, ok to proceed with treatment.        Discharge Plan:   Prescription refills given for zofran, ativan, and compazine.  Paula Owen counseled pt on these drugs.  AVS to patient via [employer].  Patient will return tomorrow 4/21/2020 for day 2.  Face to Face time: 0.    Andressa Crawford RN

## 2020-04-21 NOTE — PROGRESS NOTES
Infusion Nursing Note:  Stew Crooks presents today for Cycle 1 Day 2 Vidaza, 1 unit Packed Red Blood Cells.    Patient seen by provider today: No   present during visit today: Yes, Language: American via telephone.      Note: Patient presents to infusion today stating he feels well. He reports that yesterday and this morning he has had intermittent nausea and has taken the PRN medications. He is still eating and drinking adequately, and reports one episode of emesis. Patient states he took oral Zofran 1 hour prior to his infusion appointment today. Patient denies any concerns, symptoms, or pain toady.     Intravenous Access:  Peripheral IV placed.    Treatment Conditions:  Lab Results   Component Value Date    HGB 7.2 04/20/2020     Lab Results   Component Value Date    WBC 2.2 04/20/2020      Lab Results   Component Value Date    ANEU 1.3 04/20/2020     Lab Results   Component Value Date     04/20/2020      Lab Results   Component Value Date     04/20/2020                   Lab Results   Component Value Date    POTASSIUM 4.4 04/20/2020           Lab Results   Component Value Date    MAG 1.7 10/06/2014            Lab Results   Component Value Date    CR 1.20 04/20/2020                   Lab Results   Component Value Date    ALFREDO 8.6 04/20/2020                Lab Results   Component Value Date    BILITOTAL 0.8 04/08/2020           Lab Results   Component Value Date    ALBUMIN 3.5 04/08/2020                    Lab Results   Component Value Date    ALT 19 04/08/2020           Lab Results   Component Value Date    AST 11 04/08/2020       Results reviewed, labs MET treatment parameters, ok to proceed with treatment.  Blood transfusion consent signed 3/30/20.      Post Infusion Assessment:  Patient tolerated infusion without incident.  Blood return noted pre and post infusion.  Site patent and intact, free from redness, edema or discomfort.  No evidence of extravasations.  Access discontinued per  protocol.     Patient tolerated Vidaza injection in two syringes without incident to the right lower abdomen.    Discharge Plan:   Patient declined prescription refills.  Discharge instructions reviewed with: Patient and son (via telephone).  Patient and/or family verbalized understanding of discharge instructions and all questions answered.  AVS to patient via [employer].  Patient will return 4/22/20 for next appointment.   Patient discharged in stable condition accompanied by: self.  Departure Mode: Ambulatory.    Sammie Cruz RN

## 2020-04-22 NOTE — PATIENT INSTRUCTIONS
Contact Numbers    [facility] Main Line/Triage and after hours / weekends / holidays: [phone]      Please call the triage or after hours line if you experience a temperature greater than or equal to 100.5, shaking chills, have uncontrolled nausea, vomiting and/or diarrhea, dizziness, shortness of breath, chest pain, bleeding, unexplained bruising, or if you have any other new/concerning symptoms, questions or concerns.      If you are having any concerning symptoms or wish to speak to a provider before your next infusion visit, please call your care coordinator or triage to notify them so we can adequately serve you.     If you need a refill on a narcotic prescription or other medication, please call before your infusion appointment.       April 2020 Sunday Monday Tuesday Wednesday Thursday Friday Saturday                  1     2     3     4       5     6     7    Outpatient Visit   3:26 PM   Cristine Roman PA-C   LAB FROM [facility] 8    [facility]   8:15 AM   (15 min.)    [facility] DRAW   [facility] Lab Draw    [facility] ON[facility] INFUSION 360   9:00 AM   (360 min.)    ONCOLOGY INFUSION   [facility]    TELEPHONE VISIT NEW  11:00 AM   (30 min.)   Zoya Pearce MD   [facility] 9     10     11       12     13     14     15     16     17     18       19     20    [facility]  12:00 PM   (15 min.)    [facility] DRAW   [facility] Lab Draw    [facility] INFUSION 60  12:30 PM   (60 min.)    ONCOLOGY INFUSION   [facility] 21    [facility] INFUSION 60  12:30 PM   (60 min.)    ONCOLOGY INFUSION   [facility] 22    [facility] INFUSION 60  12:30 PM   (60 min.)    ONCOLOGY INFUSION   [facility] 23    [facility] INFUSION 60  12:30 PM   (60 min.)    ONCOLOGY INFUSION   [facility] 24    [facility] INFUSION 60  12:30 PM   (60 min.)    ONCOLOGY INFUSION   [facility] 25       26     27     28     29     30                           May 2020      [place] Monday Tuesday Wednesday Thursday Friday Saturday                            1     2       3     4     5     6     7    [facility] D[facility]   7:00 AM   (15 min.)    [facility] DRAW   [facility] Lab Draw    [facility] RETURN   7:15 AM   (30 min.)   Zoya Pearce MD   [facility] 8     9       10     11     12     13     14     15     16       17     18    [facility] D[facility]   1:00 PM   (15 min.)    [facility] DRAW   [facility] Lab Draw    [facility] INFUSION 60   1:30 PM   (60 min.)    ONCOLOGY INFUSION   [facility] 19    [facility] INFUSION 60   1:30 PM   (60 min.)    ONCOLOGY INFUSION   [facility] 20    [facility] ON[facility] INFUSION 60   1:30 PM   (60 min.)    ONCOLOGY INFUSION   [facility] 21    [facility] INFUSION 60   1:30 PM   (60 min.)    ONCOLOGY INFUSION   [facility] 22    [facility] INFUSION 60   1:30 PM   (60 min.)    ONCOLOGY INFUSION   [facility] 23       24     25     26     27     28     29     30       31                                                   Lab Results:  No results found for this or any previous visit (from the past 12 hour(s)).

## 2020-04-22 NOTE — PROGRESS NOTES
Infusion Nursing Note:  Stew Crooks presents today for Cycle 1 Day 3 Vidaza.    Patient seen by provider today: No   present during visit today: Yes, Language: Turkmen  via telephone.      Note: Patient presents to infusion today reporting a fever overnight of 100.3. He reports he felt chilled at the time and took Tylenol. His fever and chills resolved with the tylenol and he denies any fevers or chills since. He endorses some intermittent nausea that is being controlled with Zofran and Compazine. He reports he took Ativan once for the nausea but denies any vomiting. He states he is still able to eat and drink adequately. He denies any pain today. Dr. Pearce paged regarding the fever.     4/22/20 1310 TORB Dr. Pearce/Sammie Cruz RN  -Ok to go ahead with treatment today. Provider not concerned for COVID with patient, no need for work-up. Instruct the patient and son to call triage if he has any fevers or new symptoms overnight.   -Please schedule patient for labs tomorrow prior to Vidaza injection.     Writer discussed with patient and also called and discussed instructions with patient's son who verbalized understanding. Inbasket message sent to scheduling to add a lab appointment prior to infusion tomorrow.     Intravenous Access:  No Intravenous access/labs at this visit.    Treatment Conditions:  Lab Results   Component Value Date    HGB 7.2 04/20/2020     Lab Results   Component Value Date    WBC 2.2 04/20/2020      Lab Results   Component Value Date    ANEU 1.3 04/20/2020     Lab Results   Component Value Date     04/20/2020      Results reviewed, labs MET treatment parameters, ok to proceed with treatment.      Post Infusion Assessment:  Patient tolerated injection without incident to the left lower abdomen.       Discharge Plan:   Patient declined prescription refills.  Discharge instructions reviewed with: Son via telephone.  Patient and/or family verbalized understanding of  discharge instructions and all questions answered.  AVS to patient via [employer].  Patient will return 4/23/20 for next appointment.   Patient discharged in stable condition accompanied by: self.  Departure Mode: Ambulatory.    Sammie Cruz RN

## 2020-04-23 NOTE — NURSING NOTE
Chief Complaint   Patient presents with     Blood Draw     Labs drawn via  by RN in lab. VS taken. Patient checked in for next appt.     Labs collected from venipuncture by RN. Vitals taken. Checked in for appointment.    Ana Cristina Kiran RN

## 2020-04-23 NOTE — PROGRESS NOTES
Infusion Nursing Note:  Stew Crooks presents today for cycle 1 day 4 Vidaza, 1 unit PRBC.    Patient seen by provider today: No   present during visit today: Yes, Language: Czech via telephone .     Note: Pt states he is feeling better than yesterday, he has no new complaints/concerns.  Hgb 7.5 so will receive 1 unit PRBC.    Pt returns tomorrow but then not until 5/7 for another lab draw. Message sent to Dr. Pearce to see if patient should return sooner than 5/7.     Intravenous Access:  Peripheral IV placed by vascular access.    Treatment Conditions:  Lab Results   Component Value Date    HGB 7.5 04/23/2020     Lab Results   Component Value Date    WBC 1.5 04/23/2020      Lab Results   Component Value Date    ANEU 0.9 04/23/2020     Lab Results   Component Value Date    PLT 97 04/23/2020      Lab Results   Component Value Date     04/23/2020                   Lab Results   Component Value Date    POTASSIUM 4.1 04/23/2020           Lab Results   Component Value Date    MAG 1.7 10/06/2014            Lab Results   Component Value Date    CR 1.24 04/23/2020                   Lab Results   Component Value Date    ALFREDO 8.4 04/23/2020                Lab Results   Component Value Date    BILITOTAL 0.8 04/08/2020           Lab Results   Component Value Date    ALBUMIN 3.5 04/08/2020                    Lab Results   Component Value Date    ALT 19 04/08/2020           Lab Results   Component Value Date    AST 11 04/08/2020       Results reviewed, labs MET treatment parameters, ok to proceed with treatment.  Blood transfusion consent signed 3/30/20.    Post Infusion Assessment:  Patient tolerated infusion without incident.  Patient tolerated two injections into LLQ without incident.   Blood return noted pre and post infusion.  Site patent and intact, free from redness, edema or discomfort.  No evidence of extravasations.  Access discontinued per protocol.     Discharge Plan:   Patient declined  prescription refills.  Discharge instructions reviewed with: Patient.  Patient and/or family verbalized understanding of discharge instructions and all questions answered.  AVS to patient via Myfacepage[facility].  Patient will return 4/24/20 for next appointment.   Patient discharged in stable condition accompanied by: self.  Departure Mode: Ambulatory.    Kena Rainey RN

## 2020-04-24 NOTE — TELEPHONE ENCOUNTER
Called Pt's son to ascertain if Pt was having fever now w/o APAP due to ANC=0.9 on 4/23 labs.  Pt not currently febrile, spikes only occur in the evening for the past few days.   Pt's cough the same as previously reported, Bladder status unknown (does not think any issues), no rash issues.  Pt scheduled for Vidaza infusion @ 12:30. Paged Hermila REYES    Spoke with Hermila. Not an expected SE. She asked that Pt be added to her 2:10 slot, but would see him in infusion at some point. Asked for a CBC and blood cultures on each arm. Called and spoke with infusion RN about adding labs to infusion appointment. Messaged scheduling and paged Hermila an update.

## 2020-04-24 NOTE — TELEPHONE ENCOUNTER
"Patient's son calling to inform the clinic that patient has been having a fever since Tuesday evening.  It occurs every night at about 10-11 pm at night.  Tonight's temp was 100.3 and last night's temp was 100.6 F (oral).  Son says fever goes away with use of Tylenol.  Son says patient seems to be doing ok, no other symptoms.  Patient has upcoming appointment with Dr. Pearce on 5/7/20.  FNA advised that message will be routed to Dr. Pearce (per oncology protocol in One Note).  Son says the reason he called was so the doctor is aware and patient does not seem \"critical.\"      Reason for Disposition    [1] Fever > 100.0 F (37.8 C) AND [2] diabetes mellitus or weak immune system (e.g., HIV positive, cancer chemo, splenectomy, chronic steroids)    Additional Information    Negative: Shock suspected (e.g., cold/pale/clammy skin, too weak to stand, low BP, rapid pulse)    Negative: Difficult to awaken or acting confused (e.g., disoriented, slurred speech)    Negative: Bluish (or gray) lips or face now    Negative: New onset rash with many purple (or blood-colored) spots or dots    Negative: Sounds like a life-threatening emergency to the triager    Negative: Other symptom is present, see that guideline  (e.g., symptoms of cough, runny nose, sore throat, earache, abdominal pain, diarrhea, vomiting)    Negative: Fever > 104 F (40 C)    Negative: [1] Neutropenia known or suspected (e.g., recent cancer chemotherapy) AND [2] fever > 100.4 F (38.0 C)    Negative: [1] Neutropenia known or suspected (e.g., recent cancer chemotherapy) AND [2] signs or symptoms of suspected infection are present    Negative: [1] Headache AND [2] stiff neck (can't touch chin to chest)    Negative: Difficulty breathing    Negative: [1] Drinking very little AND [2] dehydration suspected (e.g., no urine > 12 hours, very dry mouth, very lightheaded)    Negative: Patient sounds very sick or weak to the triager  (Exception: mild weakness and hasn't taken " fever medicine)    Negative: [1] Fever > 101 F (38.3 C) AND [2] age > 60    Negative: [1] Fever > 101 F (38.3 C) AND [2] co-morbidity risk factor for serious infection (e.g., COPD, heart failure, liver failure, renal failure with dialysis )    Negative: [1] Fever > 100.0 F (37.8 C) AND [2] bedridden (e.g., nursing home patient, CVA, chronic illness, recovering from surgery)    Protocols used: CANCER - FEVER-A-AH

## 2020-04-24 NOTE — PROGRESS NOTES
Infusion Nursing Note:  Stew Crooks presents today for Cycle 1, Day 5 Vidaza injection. HELD    Patient seen by provider today: Yes: JEY Mcclendon   present during visit today: Yes, Ermelinda via telephone     Note: Pt assessed upon arrival to infusion suite. Pt states he has had fever for the last couple of nights and has taken tylenol, with improvement. Pt denies having fever during the day. Afebrile on arrival. Pt does have a harsh, congested cough, but states this is not new and is unchanged. BC x 2 and CBC obtained per JEY Mcclendon. Pt denies any other new or concerning issues. Hermila will assess patient in infusion.      During Hermila's assessement, pt found to have new rash on bilateral sides of his back, warm to touch, but not bothersome. Per Hermila, do not give Vidaza injection today. We will hold this dose r/t reaction to Vidaza.     Plan to schedule patient to be tested to rule out COVID -19. Pt and pt's son aware.     Treatment Conditions:  Lab Results   Component Value Date    HGB 8.6 04/24/2020     Lab Results   Component Value Date    WBC 1.5 04/24/2020      Lab Results   Component Value Date    ANEU 0.9 04/23/2020     Lab Results   Component Value Date    PLT 91 04/24/2020        Discharge Plan:   Prescription refills given for Levaquin.  Copy of AVS reviewed with patient and/or family.  Patient will return 5/7/20  for next appointment.  Patient discharged in stable condition accompanied by: self.  Departure Mode: Wheelchair.    Sarah Rutherford RN

## 2020-04-24 NOTE — PATIENT INSTRUCTIONS
Remus    1. Today we held your chemo shot given your fevers and new rash  2. For your red rash on your belly- this is normal.  You can use ice packs to help with discomfort and Evening Primrose Oil if needed.   3.  For your rash on your back, this is a reaction to your Vidaza, you can take some benadryl at night and have your wife put on some lotion  4.  Your white blood cells that fight off infection are low, so we will start you on a low dose antibiotic to help prevent you from getting an infection.   5. Today for your fever- we stephanie extra blood tubes, will get a chest xray and will check you for Covid19 (tomorrow).       [facility] Triage and after hours / weekends / holidays:  [phone]    Please call the triage or after hours line if you experience a temperature greater than or equal to 100.5, shaking chills, have uncontrolled nausea, vomiting and/or diarrhea, dizziness, shortness of breath, chest pain, bleeding, unexplained bruising, or if you have any other new/concerning symptoms, questions or concerns.      If you are having any concerning symptoms or wish to speak to a provider before your next infusion visit, please call your care coordinator or triage to notify them so we can adequately serve you.     If you need a refill on a narcotic prescription or other medication, please call before your infusion appointment.                 April 2020 Sunday Monday Tuesday Wednesday Thursday Friday Saturday                  1     2     3     4       5     6     7    Outpatient Visit   3:26 PM   Cristine Roman PA-C   [place] [facility] [place]    [facility] DRAW   8:15 AM   (15 min.)    [facility] LAB DRAW   [facility] Lab Draw    [facility] ONC INFUSION 360   9:00 AM   (360 min.)    ONCOLOGY INFUSION   [facility]    TELEPHONE VISIT NEW  11:00 AM   (30 min.)   Zoya Pearce MD   [facility] 9     10     11       12     13     14     15     16     17     18        19     20    [facility] DRAW  12:00 PM   (15 min.)   [facility]   [facility] Lab Draw    [facility] ONC INFUSION 60  12:30 PM   (60 min.)   [facility] ONCOLOGY INFUSION   [facility] 21    [facility] ONC INFUSION 60  12:30 PM   (60 min.)   [facility] ONCOLOGY INFUSION   [facility] 22    [facility] ONC INFUSION 60  12:30 PM   (60 min.)   [facility] ONCOLOGY INFUSION   [facility] 23    [facility] DRAW  11:45 AM   (15 min.)    [facility]   [facility]    [facility] ONC INFUSION 60  12:30 PM   (60 min.)   [facility] ONCOLOGY INFUSION   [facility] 24    [facility] ONC INFUSION 60  12:30 PM   (60 min.)   [facility] ONCOLOGY INFUSION   [facility]    [facility] RETURN   1:55 PM   (50 min.)   Cristine Roman PA-C   [facility]    XR CHEST 2 VIEWS   3:20 PM   (15 min.)   [id number]   [facility] Xray 25       26     27     28     29     30                           May 2020      [place] Monday Tuesday Wednesday Thursday Friday Saturday                            1     2       3     4     5     6     7    [facility] DRAW   7:00 AM   (15 min.)    [facility] DRAW   [facility]    [facility] RETURN   7:15 AM   (30 min.)   Zoya Pearce MD   [facility] 8     9       10     11     12     13     14     15     16       17     18    [facility] DRAW   1:00 PM   (15 min.)   [facility] DRAW   [facility] Lab Draw    [facility] ONC INFUSION 60   1:30 PM   (60 min.)   [facility] ONCOLOGY INFUSION   [facility] 19    [facility] ONC INFUSION 60   1:30 PM   (60 min.)   [facility] ONCOLOGY INFUSION   [facility] 20    [facility] ONC INFUSION 60   1:30 PM   (60 min.)   [facility] ONCOLOGY INFUSION   [facility] 21    [facility] ONC INFUSION 60   1:30 PM   (60 min.)   [facility] ONCOLOGY INFUSION   [facility] 22    [facility] ONC INFUSION 60   1:30 PM   (60 min.)   [facility] ONCOLOGY INFUSION    [facility] 23       24     25     26     27     28     29     30       31                                                   Lab Results:  Recent Results (from the past 12 hour(s))   CBC with platelets differential    Collection Time: 04/24/20  1:45 PM   Result Value Ref Range    WBC 1.5 (L) 4.0 - 11.0 10e9/L    RBC Count 2.86 (L) 4.4 - 5.9 10e12/L    Hemoglobin 8.6 (L) 13.3 - 17.7 g/dL    Hematocrit 26.3 (L) 40.0 - 53.0 %    MCV 92 78 - 100 fl    MCH 30.1 26.5 - 33.0 pg    MCHC 32.7 31.5 - 36.5 g/dL    RDW 17.7 (H) 10.0 - 15.0 %    Platelet Count 91 (L) 150 - 450 10e9/L    Diff Method Manual Differential     % Neutrophils 78.2 %    % Lymphocytes 18.3 %    % Monocytes 0.0 %    % Eosinophils 1.7 %    % Basophils 0.0 %    % Myelocytes 0.9 %    % Blasts 0.9 %    Absolute Neutrophil 1.2 (L) 1.6 - 8.3 10e9/L    Absolute Lymphocytes 0.3 (L) 0.8 - 5.3 10e9/L    Absolute Monocytes 0.0 0.0 - 1.3 10e9/L    Absolute Eosinophils 0.0 0.0 - 0.7 10e9/L    Absolute Basophils 0.0 0.0 - 0.2 10e9/L    Absolute Myelocytes 0.0 0 10e9/L    Absolute Blasts 0.0 0 10e9/L    Anisocytosis Slight     Poikilocytosis Moderate     RBC Fragments Slight     Teardrop Cells Moderate     Acanthocytes Moderate     Elliptocytes Slight     Platelet Estimate Confirming automated cell count    Blood culture    Collection Time: 04/24/20  1:45 PM    Specimen: Blood    Unspecified Site   Result Value Ref Range    Specimen Description Blood Unspecified Site     Culture Micro PENDING    Blood culture    Collection Time: 04/24/20  1:45 PM    Specimen: Blood    Unspecified Site   Result Value Ref Range    Specimen Description Blood Unspecified Site     Culture Micro PENDING

## 2020-04-24 NOTE — PROGRESS NOTES
Apr 24, 2020    ADD ON, FEVER    S: newly diagnosed MDS, started Vidaza this week, see Dr Pearce for prior history.  Concern with fevers of 100.2 and 100.3 each night Tues, Wed and Thurs evening with some chills.  He was feeling it was 2/2 to Vidaza.  No change in chronic cough, no new urinary issues.  Mild nausea and some constipation.  No new skin lesions. He takes 1 Tylenol, goes to bed and no issues during the day with temps or chills.     O:   Vitals 4/24/2020   Systolic 154   Diastolic 74   Pulse 78   Respiration 20   Temp 98.7   O2 95     Heart RRR  Lungs quiet but no crackles or wheezing  Skin 3 areas from Vidaza shots 4 x 4 inches, red, normal appearing injection site reactions  Erythematous drug appearing rash on bilateral posterior axillary regions and some spotty erythema on the back       4/23/2020 12:17 4/24/2020 13:45   WBC 1.5 (L) 1.5 (L)   Hemoglobin 7.5 (L) 8.6 (L)   Hematocrit 22.8 (L) 26.3 (L)   Platelet Count 97 (L) 91 (L)   RBC Count 2.42 (L) 2.86 (L)   MCV 94 92   MCH 31.0 30.1   MCHC 32.9 32.7   RDW 17.4 (H) 17.7 (H)   Diff Method Manual Differential Manual Differential   % Neutrophils 63.2 78.2   % Lymphocytes 30.7 18.3   % Monocytes 0.9 0.0   % Eosinophils 0.0 1.7   % Basophils 1.7 0.0   % Myelocytes  0.9   % Blasts 3.5 0.9   Absolute Neutrophil 0.9 (L) 1.2 (L)     Chest XR- not read, but possible bibasilar changes    A/P:     MDS- s/p 4 days of vidaza, tolerating with injection site changes.  Normal in appearance- recommended cool compresses and evening primrose oil for comfort.  He has some nausea, taking zofran and constipation, taking a softener.  Given new back rash and fever work up, will hold day 5 today.   -RTC next week for labs and follow up    FEVER- no localizing symptoms. Has COPD and chronic cough. BC x 2 drawn, CXR pending.  Given ANC starting to drop will start on ppx levaquin 250 mg daily.  Given chronic cough and new fever--will get COVID19 testing    Rash: on bilateral  shoulder blades and tracing down mid ax line.  Drug reaction, not itching or tender.  Will start benadryl at night and monitor.     Hermila Roman PA-C

## 2020-04-24 NOTE — LETTER
4/24/2020       RE: Stew Crooks  [address] [address]     Dear Colleague,    Thank you for referring your patient, Stew Crooks, to the [facility]. Please see a copy of my visit note below.    Apr 24, 2020    ADD ON, FEVER    S: newly diagnosed MDS, started Vidaza this week, see Dr Pearce for prior history.  Concern with fevers of 100.2 and 100.3 each night Tues, Wed and Thurs evening with some chills.  He was feeling it was 2/2 to Vidaza.  No change in chronic cough, no new urinary issues.  Mild nausea and some constipation.  No new skin lesions. He takes 1 Tylenol, goes to bed and no issues during the day with temps or chills.     O:   Vitals 4/24/2020   Systolic 154   Diastolic 74   Pulse 78   Respiration 20   Temp 98.7   O2 95     Heart RRR  Lungs quiet but no crackles or wheezing  Skin 3 areas from Vidaza shots 4 x 4 inches, red, normal appearing injection site reactions  Erythematous drug appearing rash on bilateral posterior axillary regions and some spotty erythema on the back       4/23/2020 12:17 4/24/2020 13:45   WBC 1.5 (L) 1.5 (L)   Hemoglobin 7.5 (L) 8.6 (L)   Hematocrit 22.8 (L) 26.3 (L)   Platelet Count 97 (L) 91 (L)   RBC Count 2.42 (L) 2.86 (L)   MCV 94 92   MCH 31.0 30.1   MCHC 32.9 32.7   RDW 17.4 (H) 17.7 (H)   Diff Method Manual Differential Manual Differential   % Neutrophils 63.2 78.2   % Lymphocytes 30.7 18.3   % Monocytes 0.9 0.0   % Eosinophils 0.0 1.7   % Basophils 1.7 0.0   % Myelocytes  0.9   % Blasts 3.5 0.9   Absolute Neutrophil 0.9 (L) 1.2 (L)     Chest XR- not read, but possible bibasilar changes    A/P:     MDS- s/p 4 days of vidaza, tolerating with injection site changes.  Normal in appearance- recommended cool compresses and evening primrose oil for comfort.  He has some nausea, taking zofran and constipation, taking a softener.  Given new back rash and fever work up, will hold day 5 today.   -RTC next week for labs and follow up    FEVER- no  Slight increase in HA's recently - likely related to weather changes and lack of sleep - advised to avoid triggers and con't prn Advil as it does help her symptoms - if not better or con't to get > 6 HA's a month needs to come in and discuss daily preventive meds, urged not to use Advil daily to prevent occurrence of rebound HA's localizing symptoms. Has COPD and chronic cough. BC x 2 drawn, CXR pending.  Given ANC starting to drop will start on ppx levaquin 250 mg daily.  Given chronic cough and new fever--will get COVID19 testing    Rash: on bilateral shoulder blades and tracing down mid ax line.  Drug reaction, not itching or tender.  Will start benadryl at night and monitor.     Hermila Roman PA-C    Again, thank you for allowing me to participate in the care of your patient.      Sincerely,    Cristine Roman PA-C

## 2020-04-28 NOTE — PROGRESS NOTES
Provided patient with COVID19 education handout.  Yamile Tenorio CMA ([facility]) 4/28/2020 2:47 PM

## 2020-04-28 NOTE — NURSING NOTE
Provided patient with COVID19 education handout.  Yamile Tenorio CMA ([facility]) 4/28/2020 2:47 PM

## 2020-05-06 NOTE — PROGRESS NOTES
"Hematology and Oncology Follow-up Note  5/7/2020     Reason for Visit: Locally advanced NSCLC and MDS    HPI:  1. 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  No FLT3 ITD, but not enough sample for D835 analysis  - History of pancytopenia, December 2015, with negative evaluation except for bone marrow biopsy possibly suspicious for MDS. Normal cytogenetics  - Initiated treatment with azacitidine 4/19/20, in attempts to decrease in his need for transfusion and to improve his quality of life.     2.  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 Received chemoradiation with cisplatin/Etoposide 4/14/14-5/19/14.  6000 cGy in 30 fractions completed 6/9/14 . Difficult course, with hospitalizations for neutropenic fevers esophagitis. No evidence of lung cancer recurrence.    Interim History: History taken with the help of personal , on iPad.  Mr. Crooks is feeling okay. He continues on Vidaza. He was found to be anemic with a HgB of 7.5 on 4/23/20, and received 1 unit pRBCs. On 4/24/20 he developed a fever and underwent COVID-19 testing (which proved negative) and had a blood culture (also negative), and his Vidaza was temporarily held.     Today he was interviewed with assistance of a  and his son. Mr. Patton complains of some persistent but improving fatigue, and states that his previous rash has \"completely disappeared.\" He has a chronic cough, which he states is largely unchanged. He states that he has frequent daily nosebleeds, often occurring the the morning, that resolve spontaneously once he blows his nose. He requests to be screened for \"candida pulmonalis,\" but review of his most recent CXR does not demonstrate any concerning findings. Additionally his O2 sat from today is adequate. He denies fever/chills, CP, SOB " or pain at any other site. He had labs completed this morning, which have revealed a HgB of 6.9, and PLTs at 63.    PHYSICAL EXAMINATION:  /59 (BP Location: Right arm, Patient Position: Sitting, Cuff Size: Adult Regular)   Pulse 96   Temp 97.8  F (36.6  C) (Oral)   Resp 16   Wt 71.7 kg (158 lb)   SpO2 98%   BMI 23.08 kg/m    GEN: Appears well, alert, oriented, and in NAD. Obese  HEENT: EOMI, mildly pale conjunctiva, MMM  NECK: Full ROM, no cervical or supraclavicular lymphadenopathy  CV: RRR, no murmurs/rubs/gallops, good distal perfusion   RESP: Breathing comfortably on room air, clear to ausculation bilaterally, no wheezes/rales/rhonchi  ABDOMEN: Soft, NT, ND, bowel sounds present. Midline laparotomy scare appreciated, well healed.  SKIN: Normal color and turgor  NEURO: No focal deficits, CN 3-12 grossly intact, normal and symmetric motor and sensory exam in bilateral upper and lower extremities, normal gait  PSYCH: Appropriate mood and affect    Labs:  Orders Only on 05/07/2020   Component Date Value Ref Range Status     WBC 05/07/2020 1.3* 4.0 - 11.0 10e9/L Final     RBC Count 05/07/2020 2.29* 4.4 - 5.9 10e12/L Final     Hemoglobin 05/07/2020 6.9* 13.3 - 17.7 g/dL Final    Comment: This result has been called to HAM REYNAGA by Galilea Parham on 05 07 2020 at   0751, and has been read back.        Hematocrit 05/07/2020 20.8* 40.0 - 53.0 % Final     MCV 05/07/2020 91  78 - 100 fl Final     MCH 05/07/2020 30.1  26.5 - 33.0 pg Final     MCHC 05/07/2020 33.2  31.5 - 36.5 g/dL Final     RDW 05/07/2020 15.7* 10.0 - 15.0 % Final     Platelet Count 05/07/2020 63* 150 - 450 10e9/L Final     Diff Method 05/07/2020 PENDING   Incomplete     Sodium 05/07/2020 139  133 - 144 mmol/L Final     Potassium 05/07/2020 4.4  3.4 - 5.3 mmol/L Final     Chloride 05/07/2020 108  94 - 109 mmol/L Final     Carbon Dioxide 05/07/2020 24  20 - 32 mmol/L Final     Anion Gap 05/07/2020 6  3 - 14 mmol/L Final     Glucose 05/07/2020  124* 70 - 99 mg/dL Final     Urea Nitrogen 05/07/2020 19  7 - 30 mg/dL Final     Creatinine 05/07/2020 1.14  0.66 - 1.25 mg/dL Final     GFR Estimate 05/07/2020 63  >60 mL/min/[1.73_m2] Final    Comment: Non  GFR Calc  Starting 12/18/2018, serum creatinine based estimated GFR (eGFR) will be   calculated using the Chronic Kidney Disease Epidemiology Collaboration   (CKD-EPI) equation.       GFR Estimate If Black 05/07/2020 72  >60 mL/min/[1.73_m2] Final    Comment:  GFR Calc  Starting 12/18/2018, serum creatinine based estimated GFR (eGFR) will be   calculated using the Chronic Kidney Disease Epidemiology Collaboration   (CKD-EPI) equation.       Calcium 05/07/2020 8.7  8.5 - 10.1 mg/dL Final     Bilirubin Total 05/07/2020 0.9  0.2 - 1.3 mg/dL Final     Albumin 05/07/2020 3.2* 3.4 - 5.0 g/dL Final     Protein Total 05/07/2020 7.2  6.8 - 8.8 g/dL Final     Alkaline Phosphatase 05/07/2020 106  40 - 150 U/L Final     ALT 05/07/2020 18  0 - 70 U/L Final     AST 05/07/2020 7  0 - 45 U/L Final     Asessment and Plan: Mr. Crooks is a 75yr old gentleman with Locally advanced NSCLC, s/p defintive chemoradiation, and MDS. He is being treated with azacitidine, and continues to require for periodic transfusions to address persistent fatigue.      Tolerating chemotherapy relatively well. HgB is moderately down today, and we will type and cross the patient today and have him scheduled for blood transfusion tomorrow.  His thrombocytopenia is expected, and roughly stable. Continue on azacitidine for now.    There are no signs and symptoms suggestive of recurrence of his NSCLC.     The patient was discussed with staff, Dr. Pearce.    Bhavik Young MD-PhD PGY-4  Radiation Oncology Resident, [facility]      Attending Note:  I have reviewed the patient chart, and interviewed and examined the patient.  I was present for entire patient visit. His son was also listening in on speaker  phone. I agree with the assessment and plan.  He has completed 1 cycle of azacitidine and tolerated that fairly well.  He does complain of fatigue, but no significant dyspnea.  For unclear reasons he is concerned about the history of Candida in the lung, but then says that they discovered it by getting a sample from his mouth.  I suspect he may be a thrush in the past, but he currently does not have thrush now.  He is neutropenic.  If he does remain neutropenic he may need to be placed on prophylactic antifungals.  He is already on prophylactic levofloxacin.  He has had his absolute neutrophil count drop to 0.5×10 tonight per liter, therefore will decrease his dose to 67%.  If his counts improve, then dose can be increased back up on subsequent cycles.    -Return tomorrow for 1 unitPRBCs  -Return 5/18 for visit with Samantha Garcia PA-C or other MEAGAN and to start of next cycle of azacitidine-dose of 67% of cycle 1.  F/u Portia with labs 5/28  oZya Pearce MD  Hematology

## 2020-05-07 NOTE — NURSING NOTE
"Oncology Rooming Note    May 7, 2020 7:34 AM   Stew Crooks is a 75 year old male who presents for:    Chief Complaint   Patient presents with     Blood Draw     Labs drawn via  by RN in lab. VS taken.      Initial Vitals: /59 (BP Location: Right arm, Patient Position: Sitting, Cuff Size: Adult Regular)   Pulse 96   Temp 97.8  F (36.6  C) (Oral)   Resp 16   Wt 71.7 kg (158 lb)   SpO2 98%   BMI 23.08 kg/m   Estimated body mass index is 23.08 kg/m  as calculated from the following:    Height as of 3/30/20: 1.762 m (5' 9.37\").    Weight as of this encounter: 71.7 kg (158 lb). Body surface area is 1.87 meters squared.  No Pain (0) Comment: Data Unavailable   No LMP for male patient.  Allergies reviewed: Yes  Medications reviewed: Yes    Medications: Medication refills not needed today.  Pharmacy name entered into [employer]: [id number] [id number] [id number]    Clinical concerns: JUAN Rey CMA              "

## 2020-05-07 NOTE — NURSING NOTE
Chief Complaint   Patient presents with     Blood Draw     Labs drawn via  by RN in lab. VS taken.      Brandee Barajas RN,

## 2020-05-07 NOTE — LETTER
"5/7/2020       RE: Stew Crooks  [address] [facility] [place] [place] [address]     Dear Colleague,    Thank you for referring your patient, Stew Crooks, to the [facility]. Please see a copy of my visit note below.    Hematology and Oncology Follow-up Note  5/7/2020     Reason for Visit: Locally advanced NSCLC and MDS    HPI:  1. 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  No FLT3 ITD, but not enough sample for D835 analysis  - History of pancytopenia, December 2015, with negative evaluation except for bone marrow biopsy possibly suspicious for MDS. Normal cytogenetics  - Initiated treatment with azacitidine 4/19/20, in attempts to decrease in his need for transfusion and to improve his quality of life.     2.  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 Received chemoradiation with cisplatin/Etoposide 4/14/14-5/19/14.  6000 cGy in 30 fractions completed 6/9/14 . Difficult course, with hospitalizations for neutropenic fevers esophagitis. No evidence of lung cancer recurrence.    Interim History: History taken with the help of personal , on iPad.  Mr. Crooks is feeling okay. He continues on Vidaza. He was found to be anemic with a HgB of 7.5 on 4/23/20, and received 1 unit pRBCs. On 4/24/20 he developed a fever and underwent COVID-19 testing (which proved negative) and had a blood culture (also negative), and his Vidaza was temporarily held.     Today he was interviewed with assistance of a  and his son. Mr. Patton complains of some persistent but improving fatigue, and states that his previous rash has \"completely disappeared.\" He has a chronic cough, which he states is largely unchanged. He states that he has frequent daily nosebleeds, often occurring the the morning, that resolve spontaneously once he blows " "his nose. He requests to be screened for \"candida pulmonalis,\" but review of his most recent CXR does not demonstrate any concerning findings. Additionally his O2 sat from today is adequate. He denies fever/chills, CP, SOB or pain at any other site. He had labs completed this morning, which have revealed a HgB of 6.9, and PLTs at 63.    PHYSICAL EXAMINATION:  /59 (BP Location: Right arm, Patient Position: Sitting, Cuff Size: Adult Regular)   Pulse 96   Temp 97.8  F (36.6  C) (Oral)   Resp 16   Wt 71.7 kg (158 lb)   SpO2 98%   BMI 23.08 kg/m    GEN: Appears well, alert, oriented, and in NAD. Obese  HEENT: EOMI, mildly pale conjunctiva, MMM  NECK: Full ROM, no cervical or supraclavicular lymphadenopathy  CV: RRR, no murmurs/rubs/gallops, good distal perfusion   RESP: Breathing comfortably on room air, clear to ausculation bilaterally, no wheezes/rales/rhonchi  ABDOMEN: Soft, NT, ND, bowel sounds present. Midline laparotomy scare appreciated, well healed.  SKIN: Normal color and turgor  NEURO: No focal deficits, CN 3-12 grossly intact, normal and symmetric motor and sensory exam in bilateral upper and lower extremities, normal gait  PSYCH: Appropriate mood and affect    Labs:  Orders Only on 05/07/2020   Component Date Value Ref Range Status     WBC 05/07/2020 1.3* 4.0 - 11.0 10e9/L Final     RBC Count 05/07/2020 2.29* 4.4 - 5.9 10e12/L Final     Hemoglobin 05/07/2020 6.9* 13.3 - 17.7 g/dL Final    Comment: This result has been called to HAM REYNAGA by Galilea Parham on 05 07 2020 at   0751, and has been read back.        Hematocrit 05/07/2020 20.8* 40.0 - 53.0 % Final     MCV 05/07/2020 91  78 - 100 fl Final     MCH 05/07/2020 30.1  26.5 - 33.0 pg Final     MCHC 05/07/2020 33.2  31.5 - 36.5 g/dL Final     RDW 05/07/2020 15.7* 10.0 - 15.0 % Final     Platelet Count 05/07/2020 63* 150 - 450 10e9/L Final     Diff Method 05/07/2020 PENDING   Incomplete     Sodium 05/07/2020 139  133 - 144 mmol/L Final     " Potassium 05/07/2020 4.4  3.4 - 5.3 mmol/L Final     Chloride 05/07/2020 108  94 - 109 mmol/L Final     Carbon Dioxide 05/07/2020 24  20 - 32 mmol/L Final     Anion Gap 05/07/2020 6  3 - 14 mmol/L Final     Glucose 05/07/2020 124* 70 - 99 mg/dL Final     Urea Nitrogen 05/07/2020 19  7 - 30 mg/dL Final     Creatinine 05/07/2020 1.14  0.66 - 1.25 mg/dL Final     GFR Estimate 05/07/2020 63  >60 mL/min/[1.73_m2] Final    Comment: Non  GFR Calc  Starting 12/18/2018, serum creatinine based estimated GFR (eGFR) will be   calculated using the Chronic Kidney Disease Epidemiology Collaboration   (CKD-EPI) equation.       GFR Estimate If Black 05/07/2020 72  >60 mL/min/[1.73_m2] Final    Comment:  GFR Calc  Starting 12/18/2018, serum creatinine based estimated GFR (eGFR) will be   calculated using the Chronic Kidney Disease Epidemiology Collaboration   (CKD-EPI) equation.       Calcium 05/07/2020 8.7  8.5 - 10.1 mg/dL Final     Bilirubin Total 05/07/2020 0.9  0.2 - 1.3 mg/dL Final     Albumin 05/07/2020 3.2* 3.4 - 5.0 g/dL Final     Protein Total 05/07/2020 7.2  6.8 - 8.8 g/dL Final     Alkaline Phosphatase 05/07/2020 106  40 - 150 U/L Final     ALT 05/07/2020 18  0 - 70 U/L Final     AST 05/07/2020 7  0 - 45 U/L Final     Asessment and Plan: Mr. Crooks is a 75yr old gentleman with Locally advanced NSCLC, s/p defintive chemoradiation, and MDS. He is being treated with azacitidine, and continues to require for periodic transfusions to address persistent fatigue.      Tolerating chemotherapy relatively well. HgB is moderately down today, and we will type and cross the patient today and have him scheduled for blood transfusion tomorrow.  His thrombocytopenia is expected, and roughly stable. Continue on azacitidine for now.    There are no signs and symptoms suggestive of recurrence of his NSCLC.     The patient was discussed with staff, Dr. Pearce.    Bhavik Young MD-PhD PGY-4  Radiation  Oncology Resident, [facility]      Attending Note:  I have reviewed the patient chart, and interviewed and examined the patient.  I was present for entire patient visit. His son was also listening in on speaker phone. I agree with the assessment and plan.  He has completed 1 cycle of azacitidine and tolerated that fairly well.  He does complain of fatigue, but no significant dyspnea.  For unclear reasons he is concerned about the history of Candida in the lung, but then says that they discovered it by getting a sample from his mouth.  I suspect he may be a thrush in the past, but he currently does not have thrush now.  He is neutropenic.  If he does remain neutropenic he may need to be placed on prophylactic antifungals.  He is already on prophylactic levofloxacin.  He has had his absolute neutrophil count drop to 0.5×10 tonight per liter, therefore will decrease his dose to 67%.  If his counts improve, then dose can be increased back up on subsequent cycles.    -Return tomorrow for 1 unitPRBCs  -Return 5/18 for visit with Samantha Garcia PA-C or other MEAGAN and to start of next cycle of azacitidine-dose of 67% of cycle 1.  F/u Portia with labs 5/28  Zoya Pearce MD  Hematology

## 2020-05-08 NOTE — PROGRESS NOTES
Blood Product Infusion Nursing Note    Stew Crooks comes to [facility] today for a PBC infusion. Pt's vitals recorded and entered into flow sheet. Medications recorded on MAR. Access recorded in flow sheet.    Type and Crossmatch completed on: 5/7/2020    All pertinent labs reviewed prior to infusion: YES    Blood Product order verified and double checked for accuracy: YES  2nd : Yamile Laureano RN    Date of consent or authorization: 3/30/2020    : Ermelinda    Progress Note    Vitals were reviewed     Premedications of Tylenol 650 mg PO and Benadryl 25 mg PO given due to previous reaction.    Administrations This Visit     acetaminophen (TYLENOL) tablet 650 mg     Admin Date  05/08/2020 Action  Given Dose  650 mg Route  Oral Administered By  Natalia Dewitt, RN          diphenhydrAMINE (BENADRYL) capsule 25 mg     Admin Date  05/08/2020 Action  Given Dose  25 mg Route  Oral Administered By  Natalia Dewitt, RN              Patient tolerated the procedure well    Note:   N/A    Discharge Plan    Discharge instructions reviewed with patient: YES  Discharge papers printed and given to patient: YES  Patient/Representative verbalized understanding, all questions answered: YES    Discharged from unit at 0930 with whom: self, ride out front to home.    Natalia Dewitt RN, RN

## 2020-05-08 NOTE — PATIENT INSTRUCTIONS
Patient Education   After Your Blood Transfusion  Discharge Instructions  After you leave  After a blood transfusion (received red blood cells, platelets, plasma, cryo or granulocytes), you will need to watch for signs of a reaction for the next 48 hours.  Call your clinic or 911 (or go to the Emergency room) if you have any signs of a reaction:    Shaking or chills    Fever (temperature above 100.0 F)    Headache    Nausea    Hives    Itching    Swelling of the face or feeling flushed    Ongoing dry cough (nothing is coughed up)    Trouble breathing or wheezing  Some signs of a reaction won't show up for a few days or up to 4 weeks.   These may include:    Fatigue    Dizziness    Pink or red urine  Your clinic is:   ________________________________________  ________________________________________  For informational purposes only. Not to replace the advice of your health care provider.   Copyright   2015 [employer]. All rights reserved. [id number] - Rev 07/16.

## 2020-05-18 NOTE — NURSING NOTE
Chief Complaint   Patient presents with     Labs Only     labs drawn by venipuncture, VS taken     Labs drawn by venipuncture and VS taken. Pt checked in for next appt and discharged to [facility].     Kacie Niño RN

## 2020-05-18 NOTE — PATIENT INSTRUCTIONS
[facility] Triage and after hours / weekends / holidays:  [phone]    Please call the triage or after hours line if you experience a temperature greater than or equal to 100.5, shaking chills, have uncontrolled nausea, vomiting and/or diarrhea, dizziness, shortness of breath, chest pain, bleeding, unexplained bruising, or if you have any other new/concerning symptoms, questions or concerns.      If you are having any concerning symptoms or wish to speak to a provider before your next infusion visit, please call your care coordinator or triage to notify them so we can adequately serve you.     If you need a refill on a narcotic prescription or other medication, please call before your infusion appointment.                 May 2020      [place] Monday Tuesday Wednesday Thursday Friday Saturday                            1     2       3     4     5     6     7    [facility] DRAW   7:00 AM   (15 min.)    [facility] DRAW   [facility] Lab Draw    [facility] RETURN   7:15 AM   (30 min.)   Zoya Pearce MD   [facility] 8    [facility] SPEC INFUSION 180   7:00 AM   (180 min.)    SPEC INFUSION   [facility]ty [facility] Procedure 9       10     11     12     13     14     15     16       17     18    [facility] DRAW   1:00 PM   (15 min.)    [facility] DRAW   [facility] Lab Draw    [facility] ONC INFUSION 60   1:30 PM   (60 min.)    ONCOLOGY INFUSION   [facility] 19    [facility] ONC INFUSION 360   9:30 AM   (360 min.)    ONCOLOGY INFUSION   [facility] 20    [facility] ONC INFUSION 60   1:30 PM   (60 min.)    ONCOLOGY INFUSION   [facility] 21    [facility] ONC INFUSION 60   1:30 PM   (60 min.)    ONCOLOGY INFUSION   [facility] 22    [facility] ONC INFUSION 60   1:30 PM   (60 min.)    ONCOLOGY INFUSION   [facility] 23       24     25     26     27    [facility] DRAW   7:30 AM   (15  min.)    [facility] DRAW   [facility] Lab Draw 28    [facility] RETURN   6:45 AM   (30 min.)   Zoya Pearce MD   [facility]    [facility] ONC INFUSION 240   7:30 AM   (240 min.)   [facility]COL[facility] INFUSION   [facility] 29     30       31 June 2020 Sunday Monday Tuesday Wednesday Thursday Friday Saturday        1     2     3     4     5     6       7     8     9     10     11     12     13       14     15    [facility] DRAW  11:30 AM   (15 min.)   [facility] DRAW   [facility] Lab Draw    [facility] RETURN  11:55 AM   (50 min.)   Danielle Hall, APRN CNP   [facility]    [facility] ONC INFUSION 60   1:00 PM   (60 min.)   [facility] ONCOLOGY INFUSION   [facility] 16    [facility] ONC INFUSION 60   1:30 PM   (60 min.)   [facility] ONCOLOGY INFUSION   [facility] 17    [facility] ONC INFUSION 60   1:30 PM   (60 min.)   [facility] ONCOLOGY INFUSION   [facility] 18    [facility] ONC INFUSION 60   1:30 PM   (60 min.)   [facility] ONCOLOGY INFUSION   [facility] 19    [facility] ONC INFUSION 60   1:30 PM   (60 min.)   [facility] ONCOLOGY INFUSION   [facility] 20       21     22     23     24     25     26     27       28     29     30                                         Lab Results:  Recent Results (from the past 12 hour(s))   CBC with platelets differential    Collection Time: 05/18/20  1:25 PM   Result Value Ref Range    WBC 1.4 (L) 4.0 - 11.0 10e9/L    RBC Count 2.61 (L) 4.4 - 5.9 10e12/L    Hemoglobin 7.6 (L) 13.3 - 17.7 g/dL    Hematocrit 23.4 (L) 40.0 - 53.0 %    MCV 90 78 - 100 fl    MCH 29.1 26.5 - 33.0 pg    MCHC 32.5 31.5 - 36.5 g/dL    RDW 15.1 (H) 10.0 - 15.0 %    Platelet Count 102 (L) 150 - 450 10e9/L    Diff Method PENDING    Comprehensive metabolic panel    Collection Time: 05/18/20  1:25 PM   Result Value Ref Range    Sodium 139 133 - 144 mmol/L    Potassium 4.7 3.4  - 5.3 mmol/L    Chloride 108 94 - 109 mmol/L    Carbon Dioxide 25 20 - 32 mmol/L    Anion Gap 6 3 - 14 mmol/L    Glucose 105 (H) 70 - 99 mg/dL    Urea Nitrogen 22 7 - 30 mg/dL    Creatinine 1.06 0.66 - 1.25 mg/dL    GFR Estimate 68 >60 mL/min/[1.73_m2]    GFR Estimate If Black 79 >60 mL/min/[1.73_m2]    Calcium 9.1 8.5 - 10.1 mg/dL    Bilirubin Total 0.8 0.2 - 1.3 mg/dL    Albumin 3.3 (L) 3.4 - 5.0 g/dL    Protein Total 7.4 6.8 - 8.8 g/dL    Alkaline Phosphatase 108 40 - 150 U/L    ALT 17 0 - 70 U/L    AST 8 0 - 45 U/L

## 2020-05-18 NOTE — PROGRESS NOTES
Infusion Nursing Note:  Stew Crooks presents today for Cycle 2, Day 1 Vidaza.    Patient seen by provider today: No   present during visit today: Mauritian: Yes, patient preferred to use Son     Note: Pt assessed upon arrival to infusion suite. Pt denies fever, chills, SOB or other signs/symptoms. Pt does have a cough, but states this is occasional and not new. Preliminary ANC 0.4 today, Hgb 7.6. Pt denies feeling symptomatic. Dr. Pearce aware.     TORB: 5/18/20 @ 1403 Dr. Pearce/Shirley Rutherford, RN - Okay to proceed with this cycle of chemotherapy with ANC 0.4. Vidaza dose reduced. Okay to have patient come in tomorrow for 1 unit RBCs with his Vidaza appointment. Type and screen ordered.     Treatment Conditions:  Lab Results   Component Value Date    HGB 7.6 05/18/2020     Lab Results   Component Value Date    WBC 1.4 05/18/2020      Lab Results   Component Value Date    ANEU 0.5 05/07/2020     Lab Results   Component Value Date     05/18/2020      Lab Results   Component Value Date     05/18/2020                   Lab Results   Component Value Date    POTASSIUM 4.7 05/18/2020           Lab Results   Component Value Date    MAG 1.7 10/06/2014            Lab Results   Component Value Date    CR 1.06 05/18/2020                   Lab Results   Component Value Date    ALFREDO 9.1 05/18/2020                Lab Results   Component Value Date    BILITOTAL 0.8 05/18/2020           Lab Results   Component Value Date    ALBUMIN 3.3 05/18/2020                    Lab Results   Component Value Date    ALT 17 05/18/2020           Lab Results   Component Value Date    AST 8 05/18/2020     Results reviewed, labs MET treatment parameters, ok to proceed with treatment.    Post Infusion Assessment:  Patient tolerated injection to left lower abdomen without incident.     Discharge Plan:   Patient declined prescription refills.  Copy of AVS reviewed with patient and/or family.  Patient will return 5/19/20 for next  appointment.  Patient discharged in stable condition accompanied by: self.  Departure Mode: Ambulatory.    Sarah Rutherford RN

## 2020-05-19 NOTE — PROGRESS NOTES
Infusion Nursing Note:  Stew Crooks presents today for Cycle 2 Day 2 of Vidaza and 1 unit of PRBCs.    Patient seen by provider today: No   present during visit today: Yes, Language: Hungarian via telephone encounter.     Note: Patient reports feeling well today with no new issues or concerns. Took his Zofran prior to coming in today. Denies any nausea or changes in his cough overnight. Patient premedicated prior to PRBCs    Intravenous Access:  Peripheral IV placed.    Treatment Conditions:  Lab Results   Component Value Date    HGB 7.6 05/18/2020     Lab Results   Component Value Date    WBC 1.4 05/18/2020      Lab Results   Component Value Date    ANEU 0.4 05/18/2020     Lab Results   Component Value Date     05/18/2020      Lab Results   Component Value Date     05/18/2020                   Lab Results   Component Value Date    POTASSIUM 4.7 05/18/2020           Lab Results   Component Value Date    MAG 1.7 10/06/2014            Lab Results   Component Value Date    CR 1.06 05/18/2020                   Lab Results   Component Value Date    ALFREDO 9.1 05/18/2020                Lab Results   Component Value Date    BILITOTAL 0.8 05/18/2020           Lab Results   Component Value Date    ALBUMIN 3.3 05/18/2020                    Lab Results   Component Value Date    ALT 17 05/18/2020           Lab Results   Component Value Date    AST 8 05/18/2020       Results reviewed from 5/18, labs MET treatment parameters, ok to proceed with treatment.  Blood transfusion consent signed 3/30/20.      Post Infusion Assessment:  Patient tolerated infusion without incident.  Tolerated Vidaza injection to right abdomen.   Blood return noted pre and post infusion.  Site patent and intact, free from redness, edema or discomfort.  No evidence of extravasations.  Access discontinued per protocol.       Discharge Plan:   Patient declined prescription refills.  Discharge instructions reviewed with: Patient.  Patient  and/or family verbalized understanding of discharge instructions and all questions answered.  AVS to patient via [employer].  Patient will return 5/20/20 for next appointment.   Patient discharged in stable condition accompanied by: self.  Departure Mode: Ambulatory.    Deann Gutierrez RN

## 2020-05-20 NOTE — PATIENT INSTRUCTIONS
Contact Numbers    [facility] [place] [facility]/TRIAGE: [phone]    Call with chills and/or temperature greater than or equal to 100.5, uncontrolled nausea/vomiting, diarrhea, constipation, dizziness, shortness of breath, chest pain, bleeding, unexplained bruising, or any new/concerning symptoms, questions/concerns.     If you are having any concerning symptoms or wish to speak to a provider before your next infusion visit, please call your care coordinator or triage to notify them so we can adequately serve you.       After Hours: [phone]    If after hours, weekends, or holidays, call main hospital  and ask for Oncology doctor on call.     Today you said you felt itchy on the right side of your back.  Your nurse did not observe a rash, but some dry skin.  Please try lotion at home that is low in alcohol content (on the list of ingredients), such as Aquaphor or Eucerin.  You can also ask the pharmacist to help you pick a lotion.  If you start to see red areas or have itching anywhere else, please call the triage number.

## 2020-05-20 NOTE — PROGRESS NOTES
Infusion Nursing Note:  Stew Crooks presents today for Cycle 2, day 3 Vidaza.    Patient seen by provider today: No  Pt requested son as  via telephone.    Note: Pt presented to clinic w/ c/o itching rash on R flank; no rash observed, though skin looked dry.  Discussed with pt and pt's son and recommended using some lotion; pt's son states may not have at home, but will  up.  Pt did not request or require any intervention for pain today.    Intravenous Access:  No Intravenous access/labs at this visit.    Treatment Conditions:  Not Applicable.    Post Infusion Assessment:  Patient tolerated injection to RLQ of abd (1 syringe) without incident.    Discharge Plan:   Patient declined prescription refills.  Discharge instructions reviewed with: Patient.  Patient and/or family verbalized understanding of discharge instructions and all questions answered.  Copy of AVS reviewed with patient and/or family.  Patient will return 5/21/2020 for next appointment.  Patient discharged in stable condition accompanied by: self.  Departure Mode: Ambulatory.    Laura Banks RN

## 2020-05-21 NOTE — PROGRESS NOTES
Infusion Nursing Note:  Stew Crooks presents today for Cycle 2 Day 4 Vidaza.     present during visit today: Language: Moldovan.  Moldovan  contacted via telephone but kept getting disconnected.  Stew called his son and insisted on using him to help compleate today's visit.     Note: Vidaza was injected to LLQ of abdomen without incident.    Pt had labs ordered on treatment plan today but no lab visit scheduled.  This RN called Dr Pearce who confirmed that pt should have his labs drawn today, however it was ok to proceed with day 4 vidaza prior to results.  After pt left this RN noted that hgb was 7.9.  Pt received a unit of blood for a hgb on 7.6 on Tuesday.  Dr Pearce notified and asked if she would like pt to be transfused when here tomorrow. Per  Data:    TORB: Do not transfuse pt on 5/22/20 while here for cycle 2 day 4 for a hgb of 7.9 on 5/21/20.  Plan to have pt return as scheduled on Wednesday, 5/27/2020 to have labs, blood products and a visit with Dr Pearce.          Treatment Conditions:  Lab Results   Component Value Date    HGB 7.9 05/21/2020     Lab Results   Component Value Date    WBC 1.1 05/21/2020      Lab Results   Component Value Date    ANEU 0.5 05/21/2020     Lab Results   Component Value Date    PLT 65 05/21/2020      Lab Results   Component Value Date     05/21/2020                   Lab Results   Component Value Date    POTASSIUM 4.4 05/21/2020           Lab Results   Component Value Date    MAG 1.7 10/06/2014            Lab Results   Component Value Date    CR 0.94 05/21/2020                   Lab Results   Component Value Date    ALFREDO 9.1 05/21/2020                Lab Results   Component Value Date    BILITOTAL 0.8 05/18/2020           Lab Results   Component Value Date    ALBUMIN 3.3 05/18/2020                    Lab Results   Component Value Date    ALT 17 05/18/2020           Lab Results   Component Value Date    AST 8 05/18/2020         Discharge Plan:    Patient declined prescription refills.  Copy of AVS reviewed with patient and/or family.  Patient will return 5/22/2020 for cycle 2 day 5.  Face to Face time: 0.    Andressa Crawford RN

## 2020-05-21 NOTE — PATIENT INSTRUCTIONS
[facility] Triage and after hours / weekends / holidays:  [phone]    Please call the triage or after hours line if you experience a temperature greater than or equal to 100.5, shaking chills, have uncontrolled nausea, vomiting and/or diarrhea, dizziness, shortness of breath, chest pain, bleeding, unexplained bruising, or if you have any other new/concerning symptoms, questions or concerns.      If you are having any concerning symptoms or wish to speak to a provider before your next infusion visit, please call your care coordinator or triage to notify them so we can adequately serve you.     If you need a refill on a narcotic prescription or other medication, please call before your infusion appointment.             .    May 2020      [place] Monday Tuesday Wednesday Thursday Friday Saturday                            1     2       3     4     5     6     7    [facility]   7:00 AM   (15 min.)    [facility] D[facility]   [facility] Draw    [facility] RETURN   7:15 AM   (30 min.)   Zoya Pearce MD   [facility] 8    [facility] SPEC INFUSION 180   7:00 AM   (180 min.)    SPEC INFUSION   [facility] 9       10     11     12     13     14     15     16       17     18    [facility]   1:00 PM   (15 min.)    [facility] DRAW   [facility] Draw    [facility] ONC INFUSION 60   1:30 PM   (60 min.)    ONCOLOGY INFUSION   [facility] 19    [facility] ONC INFUSION 360   9:30 AM   (360 min.)    ONCOLOGY INFUSION   [facility] 20    [facility] ONC INFUSION 60   1:30 PM   (60 min.)    ONCOLOGY INFUSION   [facility] 21    [facility] ONC INFUSION 60   1:30 PM   (60 min.)    ONCOLOGY INFUSION   [facility] 22    [facility] ONC INFUSION 60   1:30 PM   (60 min.)    ONCOLOGY INFUSION   [facility] 23       24     25     26     27    [facility] D[facility]   7:30 AM    (15 min.)    [facility] DRAW   [facility] Draw 28    [facility] RETURN   6:45 AM   (30 min.)   Zoya Pearce MD   [facility]    [facility] ONC INFUSION 240   7:30 AM   (240 min.)   [facility] ONCOL[facility] INFUSION   [facility] 29     30 31 June 2020 Sunday Monday Tuesday Wednesday Thursday Friday Saturday        1     2     3     4     5     6       7     8     9     10     11     12     13       14     15    [facility] D[facility]  11:30 AM   (15 min.)    [facility] DRAW   [facility] Draw    [facility] RETURN  11:55 AM   (50 min.)   Danielle Hall APRN CNP   [facility]    [facility] ONC INFUSION 60   1:00 PM   (60 min.)   [facility] ONCOLOGY INFUSION   [facility] 16    [facility] ONC INFUSION 60   1:30 PM   (60 min.)   [facility] ONCOLOGY INFUSION   [facility] 17    [facility] ONC INFUSION 60   1:30 PM   (60 min.)   [facility] ONCOLOGY INFUSION   [facility] 18    [facility] ONC INFUSION 60   1:30 PM   (60 min.)   [facility] ONCOLOGY INFUSION   [facility] 19    [facility] ONC INFUSION 60   1:30 PM   (60 min.)   [facility] ONCOLOGY INFUSION   [facility] 20       21     22     23     24     25     26     27       28     29     30                                      No results found for this or any previous visit (from the past 24 hour(s)).

## 2020-05-22 NOTE — PROGRESS NOTES
Infusion Nursing Note:  Stew Crooks presents today for Cycle 2 Day 5 Vidaza SQ.    Patient seen by provider today: No   present during visit today: Yes, Language: Kyrgyz  unable to reach, patient preferred use of daughter-in-law, Karen, over the phone.     Note: Spoke with patient about dry, pink itchy patches on his back, behind bilateral armpits, he reports using Eucerin cream yesterday, also suggested trying hydrocortisone cream for the itching. Patient was agreeable. Reviewed labs with patient and Karen from yesterday, all questions answered.    Intravenous Access:  No Intravenous access/labs at this visit.    Treatment Conditions:  Lab Results   Component Value Date    HGB 7.9 05/21/2020     Lab Results   Component Value Date    WBC 1.1 05/21/2020      Lab Results   Component Value Date    ANEU 0.5 05/21/2020     Lab Results   Component Value Date    PLT 65 05/21/2020      Lab Results   Component Value Date     05/21/2020                   Lab Results   Component Value Date    POTASSIUM 4.4 05/21/2020           Lab Results   Component Value Date    MAG 1.7 10/06/2014            Lab Results   Component Value Date    CR 0.94 05/21/2020                   Lab Results   Component Value Date    ALFREDO 9.1 05/21/2020                Lab Results   Component Value Date    BILITOTAL 0.8 05/18/2020           Lab Results   Component Value Date    ALBUMIN 3.3 05/18/2020                    Lab Results   Component Value Date    ALT 17 05/18/2020           Lab Results   Component Value Date    AST 8 05/18/2020       Results reviewed, labs MET treatment parameters, ok to proceed with treatment.      Post Infusion Assessment:  Patient tolerated injection without incident to RLQ abdomen.       Discharge Plan:   Patient declined prescription refills.  Discharge instructions reviewed with: Patient and Karen.  AVS to patient via [employer].  Patient will return 5/28 for next possible transfusion appointment.    Patient discharged in stable condition accompanied by: self.  Departure Mode: Ambulatory.  Face to Face time: 0.    Onelia Bernabe RN

## 2020-05-27 NOTE — PROGRESS NOTES
In person visit. History taken with help of phone , Ermelinda. Son Sunny also on a different phone, listening in     PROBLEM LIST:  1. 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  No FLT3 ITD, but not enough sample for D835 analysis  - History of pancytopenia, December 2015, with negative evaluation except for bone marrow biopsy possibly suspicious for MDS. Normal cytogenetics  - Initiated treatment with azacitidine 4/19/20, in attempts to decrease in his need for transfusion and to improve his quality of life.      2.  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 Received chemoradiation with cisplatin/Etoposide 4/14/14-5/19/14.  6000 cGy in 30 fractions completed 6/9/14 . Difficult course, with hospitalizations for neutropenic fevers esophagitis. No evidence of lung cancer recurrence.     Interim History: History taken with the help of professionall , on iPad.  Mr. Crooks is feeling okay. He has received 2 cycles or azacitidine 4/20-4/23/20 and 5/18-5/22/20.  The second cycle dose was reduced by 33% due to neutropenia. He reports that he is having significant pruritic skin reaction at the sites of azacitidine infusions. He also says his back itches. No fever, chills, sweats. He says his appetite is only fair, but eats 3 meals a day. Mild nausea with the azacitidine infusions, not clear to me if the nausea is ongoing. No mouth sores or sore tongue. No coughing or wheezing. Some constipation, taking a laxative, unable to tell me which laxative. No bleeding sx.     Review of Systems:  As in history above. The rest of the >10 point ROS is negative.      PHYSICAL EXAMINATION:  BP (!) 149/65   Pulse 100   Temp 98.8  F (37.1  C) (Oral)   Wt 71.2 kg (156 lb 14.4 oz)   SpO2 94%   BMI 22.92 kg/m      General appearance:   Patient is 75 year old man in no acute distress.     HEENT:  No pallor, icterus, or mucositis.  No thrush on tongue.   Lymph nodes:  No cervical, supraclavicular, axillary lymphadenopathy.   Lungs:  Clear to auscultation bilaterally.   Heart:  Regular rate and rhythm; no S3 S4 or murmer.     Abdomen:  Positive bowel sounds, soft and nontender, nondistended.  No hepatomegaly. No splenomegaly appreciated.    Extremities:  No joint swelling or tenderness.  No ankle edema.     Skin:  Large 5-10 cm , coalescing erythematous patches on lower abdomen.  Mild erythema L upper back with a few faint excoriations (looks different than the rash on abdomen) No petechiae or ecchymoses.        Labs:  Results for ASHELY LOPEZ (MRN [id number]) as of 5/27/2020 13:37   Ref. Range 5/27/2020 07:47   Sodium Latest Ref Range: 133 - 144 mmol/L 142   Potassium Latest Ref Range: 3.4 - 5.3 mmol/L 4.2   Chloride Latest Ref Range: 94 - 109 mmol/L 109   Carbon Dioxide Latest Ref Range: 20 - 32 mmol/L 25   Urea Nitrogen Latest Ref Range: 7 - 30 mg/dL 22   Creatinine Latest Ref Range: 0.66 - 1.25 mg/dL 0.98   GFR Estimate Latest Ref Range: >60 mL/min/1.73_m2 75   GFR Estimate If Black Latest Ref Range: >60 mL/min/1.73_m2 87   Calcium Latest Ref Range: 8.5 - 10.1 mg/dL 8.9   Anion Gap Latest Ref Range: 3 - 14 mmol/L 8   Albumin Latest Ref Range: 3.4 - 5.0 g/dL 3.2 (L)   Protein Total Latest Ref Range: 6.8 - 8.8 g/dL 7.0   Bilirubin Total Latest Ref Range: 0.2 - 1.3 mg/dL 0.7   Alkaline Phosphatase Latest Ref Range: 40 - 150 U/L 99   ALT Latest Ref Range: 0 - 70 U/L 20   AST Latest Ref Range: 0 - 45 U/L 9   Glucose Latest Ref Range: 70 - 99 mg/dL 113 (H)   WBC Latest Ref Range: 4.0 - 11.0 10e9/L 1.2 (L)   Hemoglobin Latest Ref Range: 13.3 - 17.7 g/dL 7.0 (L)   Hematocrit Latest Ref Range: 40.0 - 53.0 % 21.8 (L)   Platelet Count Latest Ref Range: 150 - 450 10e9/L 44 (LL)   RBC Count Latest Ref Range: 4.4 - 5.9 10e12/L 2.45 (L)   MCV Latest Ref Range:  78 - 100 fl 89   MCH Latest Ref Range: 26.5 - 33.0 pg 28.6   MCHC Latest Ref Range: 31.5 - 36.5 g/dL 32.1   RDW Latest Ref Range: 10.0 - 15.0 % 14.3   Diff Method Unknown Manual Differential   % Neutrophils Latest Units: % 32.7   % Lymphocytes Latest Units: % 54.9   % Monocytes Latest Units: % 1.8   % Eosinophils Latest Units: % 0.0   % Basophils Latest Units: % 0.0   % Metamyelocytes Latest Units: % 1.8   % Blasts Latest Units: % 8.8   Absolute Neutrophil Latest Ref Range: 1.6 - 8.3 10e9/L 0.4 (LL)   Absolute Lymphocytes Latest Ref Range: 0.8 - 5.3 10e9/L 0.7 (L)   Absolute Monocytes Latest Ref Range: 0.0 - 1.3 10e9/L 0.0   Absolute Eosinophils Latest Ref Range: 0.0 - 0.7 10e9/L 0.0   Absolute Basophils Latest Ref Range: 0.0 - 0.2 10e9/L 0.0   Absolute Metamyelocytes Latest Ref Range: 0 10e9/L 0.0   Absolute Blasts Latest Ref Range: 0 10e9/L 0.1 (H)       Assessment and Recommendations:  1. MDS with transfusion dependence. Tolerating azacitidine ok with cytopenias. Too soon to know if there is any benefit. I explained that he is at risk for infection with counts this low. He should call clinic or go to [facility] ED if has a fever or other sx of infection. He is on prophylactic levaquin 250 mg daily. Also discussed risk of bleeding, although platelet count not low enough at this time to be at risk for spontaneous bleeding.     I will delay next cycle by one week for count recovery, to start June 22. Because his neutrophil violette fell to < 0.5, will decrease the dose to 50% of the original dose.    2. Anemia- transfuse 1 units PRBCs today and return Louise 10 for labs, and possible transfusion June 11     3. Rash on abdomen- reaction to the subcutaneous azacitidine- will switch to IV.  I think that the rash on back is just some dry skin.     4. Nausea- He has prescriptions for ondansetron, ativan and prochlorperazine if needed.     5. H/o lung cancer- not an active issue

## 2020-05-27 NOTE — PROGRESS NOTES
Chief Complaint   Patient presents with     Blood Draw     Blood drawn by LPN.      ERIC Martinez LPN

## 2020-05-28 NOTE — LETTER
5/28/2020       RE: Stew Crooks  [address] [facility] [place] [address]        Dear Colleague,    Thank you for referring your patient, Stew Crooks, to the [facility]. Please see a copy of my visit note below.    In person visit. History taken with help of phone , Ermelinda. Son Sunny also on a different phone, listening in     PROBLEM LIST:  1. 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  No FLT3 ITD, but not enough sample for D835 analysis  - History of pancytopenia, December 2015, with negative evaluation except for bone marrow biopsy possibly suspicious for MDS. Normal cytogenetics  - Initiated treatment with azacitidine 4/19/20, in attempts to decrease in his need for transfusion and to improve his quality of life.      2.  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 Received chemoradiation with cisplatin/Etoposide 4/14/14-5/19/14.  6000 cGy in 30 fractions completed 6/9/14 . Difficult course, with hospitalizations for neutropenic fevers esophagitis. No evidence of lung cancer recurrence.     Interim History: History taken with the help of professionall , on iPad.  Mr. Crooks is feeling okay. He has received 2 cycles or azacitidine 4/20-4/23/20 and 5/18-5/22/20.  The second cycle dose was reduced by 33% due to neutropenia. He reports that he is having significant pruritic skin reaction at the sites of azacitidine infusions. He also says his back itches. No fever, chills, sweats. He says his appetite is only fair, but eats 3 meals a day. Mild nausea with the azacitidine infusions, not clear to me if the nausea is ongoing. No mouth sores or sore tongue. No coughing or wheezing. Some constipation, taking a laxative, unable to tell me which laxative. No bleeding sx.     Review of Systems:  As in  history above. The rest of the >10 point ROS is negative.      PHYSICAL EXAMINATION:  BP (!) 149/65   Pulse 100   Temp 98.8  F (37.1  C) (Oral)   Wt 71.2 kg (156 lb 14.4 oz)   SpO2 94%   BMI 22.92 kg/m      General appearance:  Patient is 75 year old man in no acute distress.     HEENT:  No pallor, icterus, or mucositis.  No thrush on tongue.   Lymph nodes:  No cervical, supraclavicular, axillary lymphadenopathy.   Lungs:  Clear to auscultation bilaterally.   Heart:  Regular rate and rhythm; no S3 S4 or murmer.     Abdomen:  Positive bowel sounds, soft and nontender, nondistended.  No hepatomegaly. No splenomegaly appreciated.    Extremities:  No joint swelling or tenderness.  No ankle edema.     Skin:  Large 5-10 cm , coalescing erythematous patches on lower abdomen.  Mild erythema L upper back with a few faint excoriations (looks different than the rash on abdomen) No petechiae or ecchymoses.        Labs:  Results for ASHELY LOPEZ (MRN [id number]) as of 5/27/2020 13:37   Ref. Range 5/27/2020 07:47   Sodium Latest Ref Range: 133 - 144 mmol/L 142   Potassium Latest Ref Range: 3.4 - 5.3 mmol/L 4.2   Chloride Latest Ref Range: 94 - 109 mmol/L 109   Carbon Dioxide Latest Ref Range: 20 - 32 mmol/L 25   Urea Nitrogen Latest Ref Range: 7 - 30 mg/dL 22   Creatinine Latest Ref Range: 0.66 - 1.25 mg/dL 0.98   GFR Estimate Latest Ref Range: >60 mL/min/1.73_m2 75   GFR Estimate If Black Latest Ref Range: >60 mL/min/1.73_m2 87   Calcium Latest Ref Range: 8.5 - 10.1 mg/dL 8.9   Anion Gap Latest Ref Range: 3 - 14 mmol/L 8   Albumin Latest Ref Range: 3.4 - 5.0 g/dL 3.2 (L)   Protein Total Latest Ref Range: 6.8 - 8.8 g/dL 7.0   Bilirubin Total Latest Ref Range: 0.2 - 1.3 mg/dL 0.7   Alkaline Phosphatase Latest Ref Range: 40 - 150 U/L 99   ALT Latest Ref Range: 0 - 70 U/L 20   AST Latest Ref Range: 0 - 45 U/L 9   Glucose Latest Ref Range: 70 - 99 mg/dL 113 (H)   WBC Latest Ref Range: 4.0 - 11.0 10e9/L 1.2 (L)   Hemoglobin  Latest Ref Range: 13.3 - 17.7 g/dL 7.0 (L)   Hematocrit Latest Ref Range: 40.0 - 53.0 % 21.8 (L)   Platelet Count Latest Ref Range: 150 - 450 10e9/L 44 (LL)   RBC Count Latest Ref Range: 4.4 - 5.9 10e12/L 2.45 (L)   MCV Latest Ref Range: 78 - 100 fl 89   MCH Latest Ref Range: 26.5 - 33.0 pg 28.6   MCHC Latest Ref Range: 31.5 - 36.5 g/dL 32.1   RDW Latest Ref Range: 10.0 - 15.0 % 14.3   Diff Method Unknown Manual Differential   % Neutrophils Latest Units: % 32.7   % Lymphocytes Latest Units: % 54.9   % Monocytes Latest Units: % 1.8   % Eosinophils Latest Units: % 0.0   % Basophils Latest Units: % 0.0   % Metamyelocytes Latest Units: % 1.8   % Blasts Latest Units: % 8.8   Absolute Neutrophil Latest Ref Range: 1.6 - 8.3 10e9/L 0.4 (LL)   Absolute Lymphocytes Latest Ref Range: 0.8 - 5.3 10e9/L 0.7 (L)   Absolute Monocytes Latest Ref Range: 0.0 - 1.3 10e9/L 0.0   Absolute Eosinophils Latest Ref Range: 0.0 - 0.7 10e9/L 0.0   Absolute Basophils Latest Ref Range: 0.0 - 0.2 10e9/L 0.0   Absolute Metamyelocytes Latest Ref Range: 0 10e9/L 0.0   Absolute Blasts Latest Ref Range: 0 10e9/L 0.1 (H)       Assessment and Recommendations:  1. MDS with transfusion dependence. Tolerating azacitidine ok with cytopenias. Too soon to know if there is any benefit. I explained that he is at risk for infection with counts this low. He should call clinic or go to [facility] ED if has a fever or other sx of infection. He is on prophylactic levaquin 250 mg daily. Also discussed risk of bleeding, although platelet count not low enough at this time to be at risk for spontaneous bleeding.     I will delay next cycle by one week for count recovery, to start June 22. Because his neutrophil violette fell to < 0.5, will decrease the dose to 50% of the original dose.    2. Anemia- transfuse 1 units PRBCs today and return Louise 10 for labs, and possible transfusion June 11     3. Rash on abdomen- reaction to the subcutaneous azacitidine- will switch to IV.  I  think that the rash on back is just some dry skin.     4. Nausea- He has prescriptions for ondansetron, ativan and prochlorperazine if needed.     5. H/o lung cancer- not an active issue     Again, thank you for allowing me to participate in the care of your patient.      Sincerely,      Zoya Pearce MD

## 2020-05-28 NOTE — PATIENT INSTRUCTIONS
[facility] Triage and after hours / weekends / holidays:  [phone]    Please call the triage or after hours line if you experience a temperature greater than or equal to 100.5, shaking chills, have uncontrolled nausea, vomiting and/or diarrhea, dizziness, shortness of breath, chest pain, bleeding, unexplained bruising, or if you have any other new/concerning symptoms, questions or concerns.      If you are having any concerning symptoms or wish to speak to a provider before your next infusion visit, please call your care coordinator or triage to notify them so we can adequately serve you.     If you need a refill on a narcotic prescription or other medication, please call before your infusion appointment.           May 2020      [place] Monday Tuesday Wednesday Thursday Friday Saturday                            1     2       3     4     5     6     7    [facility] DRAW   7:00 AM   (15 min.)    [facility] DRAW   [facility] Lab Draw    [facility] RETURN   7:15 AM   (30 min.)   Zoya Pearce MD   [facility] 8    [facility] SPEC INFUSION 180   7:00 AM   (180 min.)    SPEC INFUSION   [facility] Specialty [facility] Procedure 9       10     11     12     13     14     15     16       17     18    [facility] DRAW   1:00 PM   (15 min.)    [facility] DRAW   [facility] Lab Draw    [facility] ONC INFUSION 60   1:30 PM   (60 min.)    ONCOLOGY INFUSION   [facility] 19    [facility] ONC INFUSION 360   9:30 AM   (360 min.)    ONCOLOGY INFUSION   [facility] 20    [facility] ONC INFUSION 60   1:30 PM   (60 min.)    ONCOLOGY INFUSION   [facility] 21    [facility] ONC INFUSION 60   1:30 PM   (60 min.)    ONCOLOGY INFUSION   [facility] 22    [facility] ONC INFUSION 60   1:30 PM   (60 min.)    ONCOLOGY INFUSION   [facility] 23       24     25     26     27    [facility] DRAW   7:30 AM   (15  min.)    [facility] DRAW   [facility] Lab Draw 28    [facility] RETURN   6:45 AM   (30 min.)   Zoya Pearce MD   [facility]    [facility] ONC INFUSION 240   7:30 AM   (240 min.)   [facility]COL[facility] INFUSION   [facility] 29     30 31 June 2020 Sunday Monday Tuesday Wednesday Thursday Friday Saturday        1     2     3     4     5     6       7     8     9     10     11     12     13       14     15    [facility] DRAW  11:30 AM   (15 min.)    [facility] DRAW   [facility] Lab Draw    [facility] RETURN  11:55 AM   (50 min.)   Danielle Hall APRN CNP   [facility]    [facility] ONC INFUSION 60   1:00 PM   (60 min.)   [facility] ONCOLOGY INFUSION   [facility] 16    [facility] ONC INFUSION 60   1:30 PM   (60 min.)   [facility] ONCOLOGY INFUSION   [facility] 17    [facility] ONC INFUSION 60   1:30 PM   (60 min.)   [facility] ONCOLOGY INFUSION   [facility] 18    [facility] ONC INFUSION 60   1:30 PM   (60 min.)   [facility] ONCOLOGY INFUSION   [facility] 19    [facility] ONC INFUSION 60   1:30 PM   (60 min.)   [facility] ONCOLOGY INFUSION   [facility] 20       21     22     23     24     25     26     27       28     29     30                                      No results found for this or any previous visit (from the past 24 hour(s)).

## 2020-05-28 NOTE — PROGRESS NOTES
Infusion Nursing Note:  Stew Crooks presents today for 1 unit PRBC's.    Patient seen by provider today: Yes: Dr Pearce   present during visit today: Yes, Language: Serbian.     Note: patient premedicated for blood with benadryl and tylenol as ordered.    Intravenous Access:  Peripheral IV placed by vascular access    Treatment Conditions:  Lab Results   Component Value Date    HGB 7.0 05/27/2020     Lab Results   Component Value Date    WBC 1.2 05/27/2020      Lab Results   Component Value Date    ANEU 0.4 05/27/2020     Lab Results   Component Value Date    PLT 44 05/27/2020      Lab Results   Component Value Date     05/27/2020                   Lab Results   Component Value Date    POTASSIUM 4.2 05/27/2020           Lab Results   Component Value Date    CR 0.98 05/27/2020                   Lab Results   Component Value Date    ALFREDO 8.9 05/27/2020                Lab Results   Component Value Date    BILITOTAL 0.7 05/27/2020           Lab Results   Component Value Date    ALBUMIN 3.2 05/27/2020                    Lab Results   Component Value Date    ALT 20 05/27/2020           Lab Results   Component Value Date    AST 9 05/27/2020       Results reviewed, labs MET treatment parameters for PRBC transfusion, ok to proceed with treatment.  Blood transfusion consent signed 3/30/2020.      Post Infusion Assessment:  Patient tolerated infusion without incident.  Blood return noted pre and post infusion.  Site patent and intact, free from redness, edema or discomfort.  No evidence of extravasations.  Access discontinued per protocol.       Discharge Plan:   Patient declined prescription refills.  Discharge instructions reviewed with: Patient.  Patient and/or family verbalized understanding of discharge instructions and all questions answered.  Copy of AVS given via [employer].  Message sent to Dr Pearce and [facility] to arrange appropriate follow up as there were no check out orders from her visit this am.    Patient discharged in stable condition accompanied by: self.   Departure Mode: Ambulatory.  Face to Face time: 0.    Jillian Jones RN

## 2020-05-28 NOTE — ED TRIAGE NOTES
Pt. Presents to ED from home with fever and cough. Currently being treated for lung CA. Macedonian speaking. AVSS on RA, febrile. A & O x 4, SBA.

## 2020-05-28 NOTE — ED PROVIDER NOTES
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 Tmax over 100 at home. A/w cough productive of clear sputum and generalized weakness. Also c/o nausea, intermittent right sided chest pain, no SOB. No abdominal pain, vomiting, or diarrhea. The pt sees Dr. Pearce of oncology.  He is on azacitidine for his myelodysplastic syndrome (since 4/19/20, last dose 5/18-5/22, complicated by pruritus/rash and nausea). He was seen in oncology clinic today and transfused 1 unit of PRBCs.  He has had neutropenic fevers in the past.  He has a follow-up scheduled with his oncologist on Louise 10 and is scheduled to receive his next dose of azacitidine on June 22.  Patient's temperature is 101  F on arrival to the emergency department today.    History obtained with the aid of an iPad [place] .     I have reviewed the Medications, Allergies, Past Medical and Surgical History, and Social History in the [employer] system.    Past Medical History:   Diagnosis Date     Acute urinary retention     with anesthesia     History of blood transfusion      Lung cancer (H) 3/31/2014    adenosquamous carcinoma of lung- Pancoast tumor     Nausea with vomiting 6/26/2014     Other chronic pain     R shoulder, arm,back     Past Surgical History:   Procedure Laterality Date     ABDOMEN SURGERY      perforated ulcer -40 yrs ago      ENDOBRONCHIAL ULTRASOUND FLEXIBLE  3/31/2014    Procedure: ENDOBRONCHIAL ULTRASOUND FLEXIBLE;  Linear Endoscopic Esophageal Ultrasound, Endobronchial Ultrasound, Left Intrajugular Port Placement;  Surgeon: Kel Parnell MD;  Location: [facility] OR     INSERT PORT VASCULAR ACCESS  3/31/2014    Procedure: INSERT PORT VASCULAR ACCESS;;   Surgeon: Kel Parnell MD;  Location: [facility] OR     REMOVE PORT VASCULAR ACCESS Left 2015    Procedure: REMOVE PORT VASCULAR ACCESS;  Surgeon: Kendall Villalta MD;  Location: [facility] OR     TRANSCERVICAL EXTENDED MEDIASTINAL LYMPHADENECTOMY N/A 10/2/2014    Procedure: TRANSCERVICAL EXTENDED MEDIASTINAL LYMPHADENECTOMY;  Surgeon: Kel Parnell MD;  Location: [facility] OR     No current facility-administered medications for this encounter.      Current Outpatient Medications   Medication     ALBUTEROL 108 (90 BASE) MCG/ACT inhaler     DULERA 100-5 MCG/ACT oral inhaler     famotidine (PEPCID) 20 MG tablet     levofloxacin (LEVAQUIN) 250 MG tablet     LORazepam (ATIVAN) 0.5 MG tablet     ondansetron (ZOFRAN) 8 MG tablet     prochlorperazine (COMPAZINE) 10 MG tablet     Allergies   Allergen Reactions     Blood Transfusion Related (Informational Only)      Developed hives and dizziness post blood transfusion on 20.     Past medical history, past surgical history, medications, and allergies were reviewed with the patient. Additional pertinent items: None    Social History     Socioeconomic History     Marital status:      Spouse name: Not on file     Number of children: 1     Years of education: Not on file     Highest education level: Not on file   Occupational History     Employer: RETIRED     Comment: Professor in Mathematics   Social Needs     Financial resource strain: Not on file     Food insecurity     Worry: Not on file     Inability: Not on file     Transportation needs     Medical: Not on file     Non-medical: Not on file   Tobacco Use     Smoking status: Former Smoker     Packs/day: 1.00     Years: 40.00     Pack years: 40.00     Types: Cigarettes     Last attempt to quit: 2011     Years since quittin.4     Smokeless tobacco: Never Used   Substance and Sexual Activity     Alcohol use: No     Comment: occasionally      Drug use: No     Sexual activity: Not on file  "  Lifestyle     Physical activity     Days per week: Not on file     Minutes per session: Not on file     Stress: Not on file   Relationships     Social connections     Talks on phone: Not on file     Gets together: Not on file     Attends [employer] service: Not on file     Active member of club or organization: Not on file     Attends meetings of clubs or organizations: Not on file     Relationship status: Not on file     Intimate partner violence     Fear of current or ex partner: Not on file     Emotionally abused: Not on file     Physically abused: Not on file     Forced sexual activity: Not on file   Other Topics Concern     Not on file   Social History Narrative     Not on file     Social history was reviewed with the patient. Additional pertinent items: None    Review of Systems  General: Positive for fevers  Skin: Has not for rash, no diaphoresis  Eyes: No eye redness or discharge  Ears/Nose/Throat: No rhinorrhea or nasal congestion  Respiratory: Positive for cough, no shortness of breath  Cardiovascular: Positive for chest pain, no palpitations  Gastrointestinal: Positive for nausea, no Medinger abdominal pain  Genitourinary: No urinary frequency, hematuria, or dysuria  Musculoskeletal: No arthralgias or myalgias  Neurologic: No numbness or weakness  Hematologic/Lymphatic/Immunologic: No leg swelling, no easy bruising/bleeding  Endocrine: No polyuria/polydypsia    A complete review of systems was performed with pertinent positives and negatives noted in the HPI, and all other systems negative.    Physical Exam   BP: 132/78  Heart Rate: 101  Temp: 99.3  F (37.4  C)  Resp: 20  Height: 175.3 cm (5' 9\")  Weight: 71.2 kg (157 lb)  SpO2: 99 %      General: Well nourished, well developed, NAD  HEENT: EOMI, anicteric. NCAT, MMM  Neck: no jugular venous distension, supple, nl ROM  Cardiac: Regular rate and rhythm. No murmurs, rubs, or gallops. Normal S1, S2.  Intact peripheral pulses  Pulm: CTAB, no stridor, " wheezes, rales, rhonchi  Abd: Soft, nontender, nondistended.  No masses palpated.    Skin: Warm and dry to the touch.  No rash  Extremities: No LE edema, no cyanosis, w/w/p  Neuro: A&Ox3, no gross focal deficits    ED Course        Procedures                           Labs Ordered and Resulted from Time of ED Arrival Up to the Time of Departure from the ED   CBC WITH PLATELETS DIFFERENTIAL - Abnormal; Notable for the following components:       Result Value    WBC 1.0 (*)     RBC Count 2.68 (*)     Hemoglobin 7.9 (*)     Hematocrit 23.9 (*)     Platelet Count 39 (*)     All other components within normal limits   COMPREHENSIVE METABOLIC PANEL - Abnormal; Notable for the following components:    Chloride 110 (*)     Glucose 110 (*)     Calcium 8.2 (*)     Albumin 3.0 (*)     Protein Total 6.6 (*)     All other components within normal limits   UA MACROSCOPIC WITH REFLEX TO MICRO AND CULTURE   COVID-19 VIRUS (CORONAVIRUS) BY PCR   LACTIC ACID WHOLE BLOOD   TROPONIN I   SARS-COV-2 (COVID-19) VIRUS RT-PCR   BLOOD CULTURE   BLOOD CULTURE            Results for orders placed or performed during the hospital encounter of 05/28/20 (from the past 24 hour(s))   CBC with platelets differential   Result Value Ref Range    WBC 1.0 (L) 4.0 - 11.0 10e9/L    RBC Count 2.68 (L) 4.4 - 5.9 10e12/L    Hemoglobin 7.9 (L) 13.3 - 17.7 g/dL    Hematocrit 23.9 (L) 40.0 - 53.0 %    MCV 89 78 - 100 fl    MCH 29.5 26.5 - 33.0 pg    MCHC 33.1 31.5 - 36.5 g/dL    RDW 14.6 10.0 - 15.0 %    Platelet Count 39 (LL) 150 - 450 10e9/L    Diff Method Manual Method    Comprehensive metabolic panel   Result Value Ref Range    Sodium 137 133 - 144 mmol/L    Potassium 4.4 3.4 - 5.3 mmol/L    Chloride 110 (H) 94 - 109 mmol/L    Carbon Dioxide 22 20 - 32 mmol/L    Anion Gap 6 3 - 14 mmol/L    Glucose 110 (H) 70 - 99 mg/dL    Urea Nitrogen 22 7 - 30 mg/dL    Creatinine 0.91 0.66 - 1.25 mg/dL    GFR Estimate 82 >60 mL/min/[1.73_m2]    GFR Estimate If Black >90  >60 mL/min/[1.73_m2]    Calcium 8.2 (L) 8.5 - 10.1 mg/dL    Bilirubin Total 1.1 0.2 - 1.3 mg/dL    Albumin 3.0 (L) 3.4 - 5.0 g/dL    Protein Total 6.6 (L) 6.8 - 8.8 g/dL    Alkaline Phosphatase 98 40 - 150 U/L    ALT 17 0 - 70 U/L    AST 12 0 - 45 U/L   Blood culture    Specimen: Arm, Right; Blood    Left Hand   Result Value Ref Range    Specimen Description Blood Left Hand     Culture Micro No growth after 1 hour    Blood culture    Specimen: Hand, Left; Blood    Right Arm   Result Value Ref Range    Specimen Description Blood Right Arm     Culture Micro No growth after 1 hour    Lactic acid whole blood   Result Value Ref Range    Lactic Acid 1.3 0.7 - 2.0 mmol/L   Troponin I   Result Value Ref Range    Troponin I ES <0.015 0.000 - 0.045 ug/L   Symptomatic COVID-19 Virus (Coronavirus) by PCR    Specimen: Nasopharyngeal   Result Value Ref Range    COVID-19 Virus PCR to [facility] - Source Nasopharyngeal     COVID-19 Virus PCR to [facility] - Result       Test received-See reflex to IDDL test SARS CoV2 (COVID-19) Virus RT-PCR   SARS-CoV-2 COVID-19 Virus (Coronavirus) RT-PCR Nasopharyngeal    Specimen: Nasopharyngeal   Result Value Ref Range    SARS-CoV-2 Virus Specimen Source Nasopharyngeal     SARS-CoV-2 PCR Result NEGATIVE     SARS-CoV-2 PCR Comment       This automated, real-time RT-PCR assay by [employer] on the [employer] has   been given Emergency Use Authorization (EUA) for the in vitro qualitative detection of RNA   from the SARS-CoV2 virus in nasopharyngeal swabs in viral transport medium from patients   with signs and symptoms of infection who are suspected of COVID-19. The performance is   unknown in asymptomatic patients.     EKG 12-lead, tracing only   Result Value Ref Range    Interpretation ECG Click View Image link to view waveform and result    XR Chest Port 1 View    Narrative    XR CHEST PORT 1 VW  5/28/2020 8:20 PM    History:  cough, fever.     Comparison: Chest radiograph dated  4/24/2020    Findings:   80 degree AP chest radiograph. Cardiac silhouette is within normal  limits. Pulmonary vasculature is mildly indistinct. High lung volumes.  New right middle/lower lobe hazy opacities, favors to be infection.  Unchanged upper lobe fibrotic changes. No pneumothorax or significant  pleural effusion.      Impression    IMPRESSION:  New right middle/lower lobe focal hazy opacities, favored to be  pneumonia.    I have personally reviewed the examination and initial interpretation  and I agree with the findings.    HANK STILL MD   UA reflex to Microscopic and Culture    Specimen: Urine Midstream; Midstream Urine   Result Value Ref Range    Color Urine Yellow     Appearance Urine Clear     Glucose Urine Negative NEG^Negative mg/dL    Bilirubin Urine Negative NEG^Negative    Ketones Urine Negative NEG^Negative mg/dL    Specific Gravity Urine 1.019 1.003 - 1.035    Blood Urine Negative NEG^Negative    pH Urine 5.5 5.0 - 7.0 pH    Protein Albumin Urine Negative NEG^Negative mg/dL    Urobilinogen mg/dL Normal 0.0 - 2.0 mg/dL    Nitrite Urine Negative NEG^Negative    Leukocyte Esterase Urine Negative NEG^Negative    Source Midstream Urine        Labs, vital signs, and imaging studies were reviewed by me.    Medications   vancomycin (VANCOCIN) 1,750 mg in sodium chloride 0.9 % 500 mL intermittent infusion (1,750 mg Intravenous New Bag 5/28/20 2252)   vancomycin (VANCOCIN) 1000 mg in dextrose 5% 200 mL PREMIX (has no administration in time range)   acetaminophen (TYLENOL) tablet 1,000 mg (1,000 mg Oral Given 5/28/20 1912)   0.9% sodium chloride BOLUS (0 mLs Intravenous Stopped 5/28/20 1940)   ceFEPIme (MAXIPIME) 2 g vial to attach to  ml bag for ADULTS or 50 ml bag for PEDS (0 g Intravenous Stopped 5/28/20 2241)       Assessments & Plan (with Medical Decision Making)   Stew Crooks is a 75 year old male who presents with fever.Ddx includes neutropenic fever, UTI, pneumonia, COVID-19 infection,  cellulitis, intra-abdominal infection.  Patient febrile on arrival to the emergency department, Tylenol given.    Laboratory work-up is remarkable for neutropenia    Chest x-ray shows probable right middle lobe/lower lobe pneumonia.  Cefepime and vancomycin were ordered.    I have reviewed the nursing notes.    I have reviewed the findings, diagnosis, plan and need for follow up with the patient.    Patient discussed with internal medicine, to be admitted to their service for further management. Plan was discussed with patient who understands and agrees with plan.     New Prescriptions    No medications on file       Final diagnoses:   Pneumonia of right middle lobe due to infectious organism (H)       5/28/2020   [facility], [place], EMERGENCY DEPARTMENT     Yamile Singh MD  05/29/20 0028

## 2020-05-28 NOTE — NURSING NOTE
"Oncology Rooming Note    May 28, 2020 7:06 AM   Stew Crooks is a 75 year old male who presents for:    Chief Complaint   Patient presents with     Oncology Clinic Visit     MDS     Initial Vitals: BP (!) 149/65   Pulse 100   Temp 98.8  F (37.1  C) (Oral)   Wt 71.2 kg (156 lb 14.4 oz)   SpO2 94%   BMI 22.92 kg/m   Estimated body mass index is 22.92 kg/m  as calculated from the following:    Height as of 3/30/20: 1.762 m (5' 9.37\").    Weight as of this encounter: 71.2 kg (156 lb 14.4 oz). Body surface area is 1.87 meters squared.  No Pain (0) Comment: Data Unavailable   No LMP for male patient.  Allergies reviewed: Yes  Medications reviewed: Yes    Medications: Medication refills not needed today.  Pharmacy name entered into [employer]: [id number] [address] [id number]    Clinical concerns: None       Eneida Hernandez CMA              "

## 2020-05-29 PROBLEM — J44.0 CHRONIC OBSTRUCTIVE PULMONARY DISEASE WITH ACUTE LOWER RESPIRATORY INFECTION (H): Status: ACTIVE | Noted: 2020-01-01

## 2020-05-29 PROBLEM — D61.818 PANCYTOPENIA (H): Status: ACTIVE | Noted: 2020-01-01

## 2020-05-29 PROBLEM — K21.00 GASTROESOPHAGEAL REFLUX DISEASE WITH ESOPHAGITIS: Status: ACTIVE | Noted: 2020-01-01

## 2020-05-29 PROBLEM — R50.81 NEUTROPENIA WITH FEVER (H): Status: ACTIVE | Noted: 2020-01-01

## 2020-05-29 PROBLEM — D70.9 NEUTROPENIA WITH FEVER (H): Status: ACTIVE | Noted: 2020-01-01

## 2020-05-29 NOTE — PLAN OF CARE
Discharge Planner SLP   Patient plan for discharge: Unknown  Current status: Videoswallow study completed per MD order. Under fluoroscopy, pt presents with functional oropharyngeal swallow mechanism. Oral mech exam remarkable for multiple missing teeth, pt does not wear dentures. Pt assessed with thin liquids, pudding, and cookie. Flash penetration occurred with thin liquids, no aspiration with any consistency. Recommend regular diet/thin liquids with pt self-selecting softer solids. Pt to be fully alert and upright for all PO, taking small bites/sips at slow rate. No additional SLP services indicated.   Barriers to return to prior living situation: None from ST perspective  Recommendations for discharge: Defer to MD  Rationale for recommendations: Functional oropharyngeal swallow mechanism, no additional SLP service indicated.        Entered by: Melody Glover 05/29/2020 12:30 PM        General


Date:: 12/30/19 - Critical Care Progress Note


Resuscitation Status: Full Code


Events in the past 12 to 24 Hours:: 





Pt is off bipap. Is awake and has no complaints.


Reason for ICU Addmission:: acute on chronic respiratory failure, altered mental

status





Physical Exam


Vital Signs: 


                                        











Temp Pulse Resp BP Pulse Ox


 


 97.4 F   99   23 H  123/55 L  97 


 


 12/30/19 08:00  12/30/19 10:00  12/30/19 10:00  12/30/19 10:00  12/30/19 10:00








                                 Intake & Output











 12/29/19 12/30/19 12/31/19





 06:59 06:59 06:59


 


Intake Total 1720 140 


 


Output Total 1600 1200 50


 


Balance 120 -1060 -50


 


Weight 103.6 kg 101.5 kg 








                                  Weight/Height





Weight                           101.5 kg


Height                           5 ft








General appearance: PRESENT: no acute distress, well-developed, well-nourished, 

other - awake,alert, NAD


Head exam: PRESENT: atraumatic, normocephalic


Respiratory exam: PRESENT: decreased breath sounds, unlabored


Cardiovascular exam: PRESENT: irregular rhythm


GI/Abdominal exam: PRESENT: soft


Gentrourinary exam: PRESENT: urethral discharge


Extremities exam: PRESENT: other - trace pretibial edema


Musculoskeletal exam: PRESENT: normal inspection


Neurological exam: PRESENT: alert, awake, CN II-XII grossly intact


Psychiatric exam: PRESENT: appropriate affect





Laboratory/Radiographs


Laboratory Results: 


                                        





                                 12/30/19 03:52 





                                 12/30/19 03:52 





                                        











  12/29/19 12/29/19 12/30/19





  14:30 15:10 03:52


 


WBC   


 


RBC   


 


Hgb   


 


Hct   


 


MCV   


 


MCH   


 


MCHC   


 


RDW   


 


Plt Count   


 


Seg Neutrophils %   


 


Carbonic Acid   1.77 H 


 


HCO3/H2CO3 Ratio   21:1 


 


ABG pH   7.42 


 


ABG pCO2   58.8 H 


 


ABG pO2   75.1 L 


 


ABG HCO3   37.2 H 


 


ABG O2 Saturation   95.0 


 


ABG Base Excess   10.5 


 


VBG pH  7.29 L  


 


VBG pCO2  85.8 H*  


 


VBG HCO3  40.7 H  


 


VBG Base Excess  10.6  


 


FiO2   28% 


 


Sodium    137.4


 


Potassium    4.4


 


Chloride    93 L


 


Carbon Dioxide    35 H


 


Anion Gap    9


 


BUN    30 H


 


Creatinine    0.89


 


Est GFR ( Amer)    > 60


 


Glucose    123 H


 


Calcium    8.6














  12/30/19 12/30/19





  03:52 04:55


 


WBC  13.4 H 


 


RBC  4.17 


 


Hgb  13.0 


 


Hct  38.9 


 


MCV  93 


 


MCH  31.2 


 


MCHC  33.4 


 


RDW  14.9 H 


 


Plt Count  198 


 


Seg Neutrophils %  Not Reportable 


 


Carbonic Acid   1.89 H


 


HCO3/H2CO3 Ratio   17:1


 


ABG pH   7.34 L


 


ABG pCO2   62.7 H


 


ABG pO2   83.9


 


ABG HCO3   33.3 H


 


ABG O2 Saturation   95.5


 


ABG Base Excess   5.7


 


VBG pH  


 


VBG pCO2  


 


VBG HCO3  


 


VBG Base Excess  


 


FiO2   3L


 


Sodium  


 


Potassium  


 


Chloride  


 


Carbon Dioxide  


 


Anion Gap  


 


BUN  


 


Creatinine  


 


Est GFR (African Amer)  


 


Glucose  


 


Calcium  








                                        











  12/25/19 12/25/19 12/25/19





  20:45 20:45 23:14


 


Creatine Kinase  46  


 


Troponin I   0.025  0.027


 


NT-Pro-B Natriuret Pep   2580 H 














  12/26/19 12/26/19 12/29/19





  05:05 11:08 07:58


 


Creatine Kinase   


 


Troponin I  0.027  0.024  0.015


 


NT-Pro-B Natriuret Pep   











Impressions: 


                                        





Head CT  12/29/19 00:00


IMPRESSION:  No hemorrhage.  9 mm ovoid hyperdensity in the left cerebellar 

hemisphere suggesting lacunar infarction, possibly acute.


EVIDENCE OF ACUTE STROKE: YES.  LEFT VERTEBROBASILAR


 








Chest X-Ray  12/30/19 00:00


IMPRESSION:  STABLE CHRONIC CHANGES.  NO ACUTE RADIOGRAPHIC FINDING IN THE 

CHEST.


 














Assessment and Plan


Plan Summary: 








Assessment: critically ill 76 yo woman with acute on chronic respiratory 

failure, AECOPD, atrial flutter,h/o PE, acute CHF, DM, h/o pulmonary fibrosis, 

altered mental status, acute lacunar infarction.





Plan:


1. Respiratory: acute on chronic respiratory failure, resolve. Pt is off bipap 

and on NC. Continue bipap prn


2. Pulmonary: AECOPD, h/o pulmonary fibrosis. WBC increasig. Will d/c levaquin 

and start vanc and cefepime. Continue steroids, bronchodilators


3. CV: atrial flutter, acute CHF, cardiomyopathy with EF of 35%. On cardizem 

drip,toprol, entresto. Care per Dr. Ruiz. 


4. Heme: on xarelto


5. Neuro: head CT shows acute lacunar infarct. Will order MRI/MRA of brain. Will

order carotid doppers. Will order PT/OT/ST


6. ID: worsening leukocytosis, possible aspiration PNA. Will d/c levaquin. Will 

start vanc and cefepime. Will check blood cultures.


7. Endocrine: DM. Accuchecks, SSI. Hypothyroidism, synthroid


8. Nutrition: had swallow eval. Needs modified barium study. NPO


9. Prophylaxis: pharmacologic DVT prophlaxis not indicated b/c pt on xarelto











Critical Time


Critical Time (minutes): 0


Level of Care: ICU


-: 


1.  The care of a critical patient is a dynamic process.  This note is a 

representative synopsis but static in nature.  The timeframe for treatments 

given in order is not necessarily the actual time these treatments may have been

done.





2.  This patient requires critical care secondary to ongoing requirements for 

therapy not offered or safe outside the critical care environment.  Transfer to 

a lower level of care will result in altered life or limb morbidity and mo

rtality.





3.  Multidisciplinary rounds completed.





4.  ABCDE bundle addressed.

## 2020-05-29 NOTE — PROGRESS NOTES
"   05/29/20 1500   Quick Adds   Type of Visit Initial Occupational Therapy Evaluation   Living Environment   Lives With spouse   Living Arrangements apartment   Home Accessibility other (see comments)  (elevator access)   Living Environment Comment Patient lives with his wife in an apartment. Has elevator access. Per chart, kids live nearby and assist with providing rides to appointments, etc.   Self-Care   Usual Activity Tolerance good   Regular Exercise No   Equipment Currently Used at Home none   Activity/Exercise/Self-Care Comment Patient denies equipment use or formal exercise program.   Functional Level   Ambulation 0-->independent   Transferring 0-->independent   Toileting 0-->independent   Bathing 0-->independent   Dressing 0-->independent   Eating 0-->independent   Communication 0-->understands/communicates without difficulty   Swallowing 0-->swallows foods/liquids without difficulty   Cognition 0 - no cognition issues reported   Fall history within last six months no   Which of the above functional risks had a recent onset or change? none   Prior Functional Level Comment Patient reports IND with ADL tasks at baseline.       Present yes  (iPad)   Language Cameroonian    General Information   Onset of Illness/Injury or Date of Surgery - Date 05/28/20   Referring Physician Aby Juarez PA-C    Patient/Family Goals Statement Does not state.   Additional Occupational Profile Info/Pertinent History of Current Problem Per chart: \"Mr. 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 The most likely source of his fever is right middle and lower lobe pneumonia given CXR findings. UA is negative, and blood cultures are " "negative to date. Urinary studies negative.\"   Precautions/Limitations fall precautions;immunosuppressed   Weight-Bearing Status - LUE full weight-bearing   Weight-Bearing Status - RUE full weight-bearing   Weight-Bearing Status - LLE full weight-bearing   Weight-Bearing Status - RLE full weight-bearing   General Observations Patient greeted supine in bed. Wearing mask. Appears fatigued.   General Info Comments Activity orders: Up ad irvin  (Hx of PE in chart - clarified with PA-C is oldl)   Cognitive Status Examination   Orientation orientation to person, place and time   Cognitive Comment Cognition appears WFL. Difficult to assess due to language barrier. Short with answers at times.   Visual Perception   Visual Perception Wears glasses   Visual Perception Comments Denies acute visual changes.   Sensory Examination   Sensory Comments Denies acute sensory changes.   Pain Assessment   Patient Currently in Pain No   Integumentary/Edema   Integumentary/Edema Comments None noted.    Range of Motion (ROM)   ROM Comment UE/LE WFL   Strength   Strength Comments DNT formally. Demonstrates at least 3/5 UE/LE.    Hand Strength   Hand Strength Comments Fair-good hand strength.    Balance   Balance Comments Patient declining all OOB activity today. Per RN, up with SBA.   Instrumental Activities of Daily Living (IADL)   IADL Comments Patient reports IND with IADL - family able to assist as well.   Activities of Daily Living Analysis   Impairments Contributing to Impaired Activities of Daily Living   (Deconditioning, Fatigue)   General Therapy Interventions   Planned Therapy Interventions home program guidelines;strengthening;other (see comments)  (Lab value education, energy conservation)   Clinical Impression   Criteria for Skilled Therapeutic Interventions Met yes, treatment indicated   OT Diagnosis Deconditioning associated with medical status, impacting ADL/IADL performance and tolerance.   Influenced by the following " "impairments Deconditioning, Fatigue   Assessment of Occupational Performance 1-3 Performance Deficits   Identified Performance Deficits Home Management, Functional Mobility Tolerance, Leisure   Clinical Decision Making (Complexity) Low complexity   Therapy Frequency 4x/week   Predicted Duration of Therapy Intervention (days/wks) 4-5 days, pending LOS   Anticipated Equipment Needs at Discharge other (see comments)  (tbd)   Anticipated Discharge Disposition Home with Assist;Other (see comments)  (HEP)   Risks and Benefits of Treatment have been explained. Yes   Patient, Family & other staff in agreement with plan of care Yes   Clinical Impression Comments Patient would benefit from skilled OT services to promote conditioning/strength in the hospital setting, provide HEP, educate on lab value safety and implications for activity, and promote ADL/IADL independence.   [facility] AM-PAC  \"6 Clicks\" Daily Activity Inpatient Short Form   1. Putting on and taking off regular lower body clothing? 4 - None   2. Bathing (including washing, rinsing, drying)? 3 - A Little   3. Toileting, which includes using toilet, bedpan or urinal? 3 - A Little   4. Putting on and taking off regular upper body clothing? 4 - None   5. Taking care of personal grooming such as brushing teeth? 4 - None   6. Eating meals? 4 - None   Daily Activity Raw Score (Score out of 24.Lower scores equate to lower levels of function) 22   Total Evaluation Time   Total Evaluation Time (Minutes) 5     "

## 2020-05-29 NOTE — PLAN OF CARE
Per RN, patient is moving well with SBA. OT to initiate therapy today and determine needs for discharge. PT will following peripherally and see patient as needed.

## 2020-05-29 NOTE — PROGRESS NOTES
[facility], [place]    Hematology / Oncology Progress Note    Date of Service (when I saw the patient): 05/29/2020     Assessment & Plan   Mr. 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 The most likely source of his fever is right middle and lower lobe pneumonia given CXR findings. UA is negative, and blood cultures are negative to date. Urinary studies negative.     Plans for Today:   - Continue zosyn/vancomycin for now  - Fluconazole 200mg daily for anti-fungal ppx w/ ANC < 1.0  - CXR with strong suspicion for PNA, BCx NGTD, UA/Urinary antigens negative, sputum cx/gram stain, MRSA cx collected and pending  - MIVFs 100mL/h started in setting of NPO prior to swallow study, consider discontinuing based on ongoing PO intake  - Swallow study today ? Flash penetration w/ fluids, no aspiration with any consistency. Recommend regular diet/thin liquids w/ pt self-selecting softer foods.   - PT/OT consulted for discharge planning  - Continue to monitor for any ongoing fevers/infectious sx  - Labs and MEAGAN visit tentatively scheduled 6/2       Neutropenic fever    # Community Acquired Pneumonia   Patient presented to ED after fever at home >100F. Upon ED arrival, Tmax of 101. Started on cefepime/vanco, infectious studies sent per below. Ongoing loose cough, otherwise no other localizing infectious sx.   - BCx NGTD  - Will start Vancomycin and Zosyn  - Holding Levaquin prophylaxis   - Sputum gram stain, sputum cx, MRSA cx pending  - UA negative, urinary antigens negative  - Swallow study today, SLP ordered. Start NaCL 100ml/hr meanwhile.     Flash penetration w/ fluids, no aspiration with any consistency. Recommend  regular diet/thin liquids w/ pt self-selecting softer foods.      # Myelodysplastic syndrome  # Pancytopenia 2/2 chemotherapy and underlying disease  Myelodysplastic syndrome with 5.6% blasts, diagnosed 3/30/20.  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  No FLT3 ITD, but not enough sample for D835 analysis  - History of pancytopenia, December 2015, with negative evaluation except for bone marrow biopsy possibly suspicious for MDS. Normal cytogenetics  - Initiated treatment with azacitidine 4/19/20, in attempts to decrease in his need for transfusion and to improve his quality of life.   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      # Nausea  # GERD   - Will continue pepcid, compazine and zofran (Home meds)      #PE   Not on AC      FEN  - IVFs: encourage po as able  - Diet: regular diet as tolerated  - Lytes repleted as per sliding scale     PPX  - DVT: holding heparin given that platelets are less than 50K  - Bowel: senna-colace, miralax prn  - GI: continue PTA PPI     Lines/Drains: PIV  CODE: Full discussed with patient   DISPO: inpatient > 2 nights for workup and management of neutropenic fever  Consults: PT/OT for discharge planning  Follow-up/Referrals: Primary oncologist is Dr. Pearce. Labs and MEAGAN visit tentatively scheduled for 6/2.    Patient is seen and examined by Dr. Tavarez.  Assessment and plan are discussed and delivered to the patient.    Aby Juarez PA-C  Hematology/Oncology  Pager: [phone]    Interval History   Patient feeling tired but otherwise okay  "this morning. Spoke on phone with son Sunny as well. Patient has had ongoing cough for \"a long time\" given his COPD, somewhat increased starting January/February but denies it being all that different currently. Occasionally coughing up some clear phlegm. Reports has had some ongoing nausea since starting chemotherapy in April; however currently denying abdominal pain, N/V/diarrhea. On colace to remain regular. Feeling more generalized weakness lately with decreased appetite. Has a hx of dysphagia and states it is worse depending on what he eats. Son tells me he had a swallow study in the past (2014) and had NG feeding tube at that time. No dysuria. No HA. Breathing feels okay. No CP. He is hungry this AM and wanting food. Patient is currently NPO for swallow study.        Physical Exam   Temp: 98.9  F (37.2  C) Temp src: Oral BP: 133/51 Pulse: 80 Heart Rate: 83 Resp: 18 SpO2: 93 % O2 Device: None (Room air)    Vitals:    05/28/20 1826 05/29/20 0909   Weight: 71.2 kg (157 lb) 69.4 kg (153 lb 1.6 oz)     Vital Signs with Ranges  Temp:  [97.8  F (36.6  C)-101  F (38.3  C)] 98.9  F (37.2  C)  Pulse:  [77-99] 80  Heart Rate:  [] 83  Resp:  [13-31] 18  BP: (103-152)/() 133/51  SpO2:  [93 %-99 %] 93 %  I/O last 3 completed shifts:  In: 600 [I.V.:600]  Out: -     General: Awake and interactive. No acute distress. Well developed. Appears stated age.   Skin: Warm, dry, and intact without rashes or lesions.   Head: Normocephalic and atraumatic.  HEENT: PERRLA. Sclera non-icteric. EOM intact. Oral mucosa is pink and moist with no lesions, thrush, or exudates.   Cardiac: Regular rate and rhythm. Normal S1 and S2. No murmurs, rubs, or gallops.   Respiratory: No signs of respiratory distress or accessory muscle use. Diffuse wheezing bilaterally. Crackles R lung at base.   Abd: Soft, symmetric, and non-tender without distension. BS present and normoactive. No masses or organomegaly.   Extremities: No swelling or " erythema. Distal pulses palpable.   Neuro: A&Ox3. Irish speaking-normal speech. Memory and thought process preserved. Grossly non-focal.  Psych: Appropriate mood and affect. Good judgment and insight. No visual/auditory hallucinations.     Medications     - MEDICATION INSTRUCTIONS -       sodium chloride 100 mL/hr at 05/29/20 0332       docusate sodium  100 mg Oral BID     famotidine  20 mg Oral Daily     fluticasone-vilanterol  1 puff Inhalation Daily     piperacillin-tazobactam  3.375 g Intravenous Q6H     vancomycin (VANCOCIN) IV  1,000 mg Intravenous Q12H       Data   Results for orders placed or performed during the hospital encounter of 05/28/20 (from the past 24 hour(s))   CBC with platelets differential   Result Value Ref Range    WBC 1.0 (L) 4.0 - 11.0 10e9/L    RBC Count 2.68 (L) 4.4 - 5.9 10e12/L    Hemoglobin 7.9 (L) 13.3 - 17.7 g/dL    Hematocrit 23.9 (L) 40.0 - 53.0 %    MCV 89 78 - 100 fl    MCH 29.5 26.5 - 33.0 pg    MCHC 33.1 31.5 - 36.5 g/dL    RDW 14.6 10.0 - 15.0 %    Platelet Count 39 (LL) 150 - 450 10e9/L    Diff Method Manual Method    Comprehensive metabolic panel   Result Value Ref Range    Sodium 137 133 - 144 mmol/L    Potassium 4.4 3.4 - 5.3 mmol/L    Chloride 110 (H) 94 - 109 mmol/L    Carbon Dioxide 22 20 - 32 mmol/L    Anion Gap 6 3 - 14 mmol/L    Glucose 110 (H) 70 - 99 mg/dL    Urea Nitrogen 22 7 - 30 mg/dL    Creatinine 0.91 0.66 - 1.25 mg/dL    GFR Estimate 82 >60 mL/min/[1.73_m2]    GFR Estimate If Black >90 >60 mL/min/[1.73_m2]    Calcium 8.2 (L) 8.5 - 10.1 mg/dL    Bilirubin Total 1.1 0.2 - 1.3 mg/dL    Albumin 3.0 (L) 3.4 - 5.0 g/dL    Protein Total 6.6 (L) 6.8 - 8.8 g/dL    Alkaline Phosphatase 98 40 - 150 U/L    ALT 17 0 - 70 U/L    AST 12 0 - 45 U/L   Blood culture    Specimen: Arm, Right; Blood    Left Hand   Result Value Ref Range    Specimen Description Blood Left Hand     Culture Micro No growth after 10 hours    Blood culture    Specimen: Hand, Left; Blood    Right  Arm   Result Value Ref Range    Specimen Description Blood Right Arm     Culture Micro No growth after 10 hours    Lactic acid whole blood   Result Value Ref Range    Lactic Acid 1.3 0.7 - 2.0 mmol/L   Troponin I   Result Value Ref Range    Troponin I ES <0.015 0.000 - 0.045 ug/L   Symptomatic COVID-19 Virus (Coronavirus) by PCR    Specimen: Nasopharyngeal   Result Value Ref Range    COVID-19 Virus PCR to [facility] - Source Nasopharyngeal     COVID-19 Virus PCR to [facility] - Result       Test received-See reflex to IDDL test SARS CoV2 (COVID-19) Virus RT-PCR   SARS-CoV-2 COVID-19 Virus (Coronavirus) RT-PCR Nasopharyngeal    Specimen: Nasopharyngeal   Result Value Ref Range    SARS-CoV-2 Virus Specimen Source Nasopharyngeal     SARS-CoV-2 PCR Result NEGATIVE     SARS-CoV-2 PCR Comment       This automated, real-time RT-PCR assay by [employer] on the [employer] has   been given Emergency Use Authorization (EUA) for the in vitro qualitative detection of RNA   from the SARS-CoV2 virus in nasopharyngeal swabs in viral transport medium from patients   with signs and symptoms of infection who are suspected of COVID-19. The performance is   unknown in asymptomatic patients.     EKG 12-lead, tracing only   Result Value Ref Range    Interpretation ECG Click View Image link to view waveform and result    XR Chest Port 1 View    Narrative    XR CHEST PORT 1 VW  5/28/2020 8:20 PM    History:  cough, fever.     Comparison: Chest radiograph dated 4/24/2020    Findings:   80 degree AP chest radiograph. Cardiac silhouette is within normal  limits. Pulmonary vasculature is mildly indistinct. High lung volumes.  New right middle/lower lobe hazy opacities, favors to be infection.  Unchanged upper lobe fibrotic changes. No pneumothorax or significant  pleural effusion.      Impression    IMPRESSION:  New right middle/lower lobe focal hazy opacities, favored to be  pneumonia.    I have personally reviewed the examination and  initial interpretation  and I agree with the findings.    HANK STILL MD   UA reflex to Microscopic and Culture    Specimen: Urine Midstream; Midstream Urine   Result Value Ref Range    Color Urine Yellow     Appearance Urine Clear     Glucose Urine Negative NEG^Negative mg/dL    Bilirubin Urine Negative NEG^Negative    Ketones Urine Negative NEG^Negative mg/dL    Specific Gravity Urine 1.019 1.003 - 1.035    Blood Urine Negative NEG^Negative    pH Urine 5.5 5.0 - 7.0 pH    Protein Albumin Urine Negative NEG^Negative mg/dL    Urobilinogen mg/dL Normal 0.0 - 2.0 mg/dL    Nitrite Urine Negative NEG^Negative    Leukocyte Esterase Urine Negative NEG^Negative    Source Midstream Urine    CBC with platelets differential   Result Value Ref Range    WBC 1.1 (L) 4.0 - 11.0 10e9/L    RBC Count 2.59 (L) 4.4 - 5.9 10e12/L    Hemoglobin 7.5 (L) 13.3 - 17.7 g/dL    Hematocrit 23.7 (L) 40.0 - 53.0 %    MCV 92 78 - 100 fl    MCH 29.0 26.5 - 33.0 pg    MCHC 31.6 31.5 - 36.5 g/dL    RDW 14.6 10.0 - 15.0 %    Platelet Count 33 (LL) 150 - 450 10e9/L    Diff Method Manual Differential     % Neutrophils 50.8 %    % Lymphocytes 34.9 %    % Monocytes 4.8 %    % Eosinophils 1.6 %    % Basophils 0.0 %    % Myelocytes 1.6 %    % Blasts 6.3 %    Absolute Neutrophil 0.6 (L) 1.6 - 8.3 10e9/L    Absolute Lymphocytes 0.4 (L) 0.8 - 5.3 10e9/L    Absolute Monocytes 0.1 0.0 - 1.3 10e9/L    Absolute Eosinophils 0.0 0.0 - 0.7 10e9/L    Absolute Basophils 0.0 0.0 - 0.2 10e9/L    Absolute Myelocytes 0.0 0 10e9/L    Absolute Blasts 0.1 (H) 0 10e9/L    Poikilocytosis Slight     Polychromasia Marked     Ovalocytes Moderate     Microcytes Present    Basic metabolic panel   Result Value Ref Range    Sodium 139 133 - 144 mmol/L    Potassium 4.2 3.4 - 5.3 mmol/L    Chloride 112 (H) 94 - 109 mmol/L    Carbon Dioxide 20 20 - 32 mmol/L    Anion Gap 7 3 - 14 mmol/L    Glucose 114 (H) 70 - 99 mg/dL    Urea Nitrogen 20 7 - 30 mg/dL    Creatinine 0.97 0.66 -  1.25 mg/dL    GFR Estimate 76 >60 mL/min/[1.73_m2]    GFR Estimate If Black 88 >60 mL/min/[1.73_m2]    Calcium 8.0 (L) 8.5 - 10.1 mg/dL   Lactic acid level STAT   Result Value Ref Range    Lactate for Sepsis Protocol 1.5 0.7 - 2.0 mmol/L   Gram stain    Specimen: Sputum   Result Value Ref Range    Specimen Description Sputum     Special Requests Screen     Gram Stain <10 Squamous epithelial cells/low power field     Gram Stain >25 PMNs/low power field     Gram Stain Few  Mixed gram negative and positive ron      Strep pneumo Agn Urine or CSF    Specimen: Urine   Result Value Ref Range    Specimen Description Urine     S Pneumoniae Antigen       Negative, no Streptococcus pneumoniae antigen detected by immunochromatographic membrane   assay. A negative Streptococcus pneumoniae antigen result does not rule out infection with   Streptococcus pneumoniae.     Legionella pneumonia antigen urine   Result Value Ref Range    Specimen Description Urine     L Pneumo Urine Antigen       Presumptive negative for Legionella pneumophilia serogroup 1 antigen in urine, suggesting   no recent or current infection.  Infection due to Legionella cannot be ruled out, since   other serogroups and species may cause disease, antigen may not be present in urine in   early infection, and the level of antigen present in the urine may be below detectable   limits of the test.     Methicillin resistant staph aureus cult    Specimen: Throat   Result Value Ref Range    Specimen Description Throat     Special Requests Specimen collected in eSwab transport (white cap)     Culture Micro PENDING

## 2020-05-29 NOTE — H&P
[facility], [place]    History and Physical  Hematology / Oncology     Date of Admission:  5/28/2020  Date of Service (when I saw the patient): 05/29/20    Assessment & Plan     Mr. 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   The most likely source of his fever is right middle and lower lobe pneumonia given CXR findings. UA is negative, and blood cultures are negative to date.     # Neutropenic fever   # Community Acquired Pneumonia   # myelodysplastic syndrome  diagnosed 3/30/20  # Pancytopenia   - BC are pending   - Will start Vancomycin and Zosyn.   - Holding Levaquin prophylaxis   - Will obtain sputum gram stain, and sputum cultures. Will also assess for MRSA, strep and legionella.   - Since patient acknowledges several episodes of chocking with food, will keep NPO and order a swallow study. Start NaCL 100ml/hr meanwhile.     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     # Nausea  # GERD   - Will continue pepcid, compazine and zofran (Home meds)     #PE   Not on AC     FEN  - IVFs: encourage po as able  - Diet: regular diet as tolerated  - Lytes repleted as per sliding scale     PPX  - DVT: holding heparin given that platelets are less than 50K  - Bowel: senna-colace, miralax prn  - GI: continue PTA PPI     Lines/Drains: PIV  CODE: Full discussed with patient   DISPO: inpatient > 2 nights for workup and management of new edema       Code Status   Full  Code    Primary Care Physician   Rani Flores    Chief Complaint   Fever     History is obtained from the patient    History of Present Illness      Mr. 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     Interview done with [place] .   He started having fever at 4pm on 5/28/2020. This was associated with cough, mild SOB, and generalized weakness. No chest pain. He denies headache, sore throat, nausea, vomiting. He denies diarrhea, dysuria, urgency or frequency. Of note, he acknowledged frequent episodes of choking while eating food.      He is currently being treated with Azacitidine for MDS, and his next dose is scheduled on June 22nd.     In the ED, his T was 101. Otherwise, he was hemodynamically stable and saturating 99% on room air. Labs showed pancytopenia.  CXR showed New right middle/lower lobe focal hazy opacities, favored to be Pneumonia. COVID negative. He received tylenol, 500mL of NaCl, Vanc and cefepime. He was then transferred to the floor in a stable condition.        Past Medical History    I have reviewed this patient's medical history and updated it with pertinent information if needed.   Past Medical History:   Diagnosis Date     Acute urinary retention     with anesthesia     History of blood transfusion      Lung cancer (H) 3/31/2014    adenosquamous carcinoma of lung- Pancoast tumor     Nausea with vomiting 6/26/2014     Other chronic pain     R shoulder, arm,back       Past Surgical History   I have reviewed this patient's surgical history and updated it with pertinent information if needed.  Past Surgical History:   Procedure Laterality Date     ABDOMEN SURGERY      perforated ulcer -40 yrs  ago      ENDOBRONCHIAL ULTRASOUND FLEXIBLE  3/31/2014    Procedure: ENDOBRONCHIAL ULTRASOUND FLEXIBLE;  Linear Endoscopic Esophageal Ultrasound, Endobronchial Ultrasound, Left Intrajugular Port Placement;  Surgeon: Kel Parnell MD;  Location: [facility] OR     INSERT PORT VASCULAR ACCESS  3/31/2014    Procedure: INSERT PORT VASCULAR ACCESS;;  Surgeon: Kel Parnell MD;  Location: [facility] OR     REMOVE PORT VASCULAR ACCESS Left 12/4/2015    Procedure: REMOVE PORT VASCULAR ACCESS;  Surgeon: Kendall Villalta MD;  Location: [facility] OR     TRANSCERVICAL EXTENDED MEDIASTINAL LYMPHADENECTOMY N/A 10/2/2014    Procedure: TRANSCERVICAL EXTENDED MEDIASTINAL LYMPHADENECTOMY;  Surgeon: Kel Parnell MD;  Location: [facility] OR       Prior to Admission Medications   Prior to Admission Medications   Prescriptions Last Dose Informant Patient Reported? Taking?   ALBUTEROL 108 (90 BASE) MCG/ACT inhaler   Yes No   Sig: INHALE 2 PUFFS BY MOUTH EVERY 4 HOURS AS NEEDED   DULERA 100-5 MCG/ACT oral inhaler   Yes No   Sig: INHALE 2 PUFFS BY MOUTH TWICE DAILY   LORazepam (ATIVAN) 0.5 MG tablet   No No   Sig: Take 1 tablet (0.5 mg) by mouth every 4 hours as needed (Anxiety, Nausea/Vomiting or Sleep)   famotidine (PEPCID) 20 MG tablet   Yes No   levofloxacin (LEVAQUIN) 250 MG tablet   No No   Sig: Take 1 tablet (250 mg) by mouth daily   ondansetron (ZOFRAN) 8 MG tablet   No No   Sig: Take 1 tablet (8 mg) by mouth every 8 hours as needed (Nausea/Vomiting)   prochlorperazine (COMPAZINE) 10 MG tablet   No No   Sig: Take 0.5 tablets (5 mg) by mouth every 6 hours as needed (Nausea/Vomiting)      Facility-Administered Medications: None     Allergies   Allergies   Allergen Reactions     Blood Transfusion Related (Informational Only)      Developed hives and dizziness post blood transfusion on 4/8/20.       Social History   I have reviewed this patient's social history and updated it with pertinent information if needed. Stew  Valeriy  reports that he quit smoking about 9 years ago. His smoking use included cigarettes. He has a 40.00 pack-year smoking history. He has never used smokeless tobacco. He reports that he does not drink alcohol or use drugs.    Family History   I have reviewed this patient's family history and updated it with pertinent information if needed.   Family History   Problem Relation Age of Onset     Cancer No family hx of        Review of Systems   The 10 point Review of Systems is negative other than noted in the HPI or here.     Physical Exam   Temp: 100.1  F (37.8  C) Temp src: Oral BP: 103/45 Pulse: 77 Heart Rate: 83 Resp: 16 SpO2: 97 % O2 Device: None (Room air)    Vital Signs with Ranges  Temp:  [98.2  F (36.8  C)-101  F (38.3  C)] 100.1  F (37.8  C)  Pulse:  [] 77  Heart Rate:  [] 83  Resp:  [13-31] 16  BP: (103-149)/(45-78) 103/45  SpO2:  [94 %-99 %] 97 %  157 lbs 0 oz    Constitutional: Awake, alert, cooperative, in NAD.  Eyes: PERRL, EOMI, sclera clear, conjunctiva normal.  ENT: Normocephalic, without obvious abnormality, sinuses nontender on palpation, oral pharynx with moist mucus membranes  Respiratory: Non-labored breathing, good air exchange, crackles heard in Right lower lung field.   Cardiovascular: RRR, no murmur noted.  GI: + bowel sounds, soft, non-distended, non-tender, no masses palpated, no hepatosplenomegaly.  Skin: No concerning lesions on exposed areas.  Musculoskeletal: No edema  Neurologic: Awake, alert & oriented x3.  Cranial nerves II-XII are grossly intact.   Psych: appropriate affect    Data   Results for orders placed or performed during the hospital encounter of 05/28/20 (from the past 24 hour(s))   CBC with platelets differential   Result Value Ref Range    WBC 1.0 (L) 4.0 - 11.0 10e9/L    RBC Count 2.68 (L) 4.4 - 5.9 10e12/L    Hemoglobin 7.9 (L) 13.3 - 17.7 g/dL    Hematocrit 23.9 (L) 40.0 - 53.0 %    MCV 89 78 - 100 fl    MCH 29.5 26.5 - 33.0 pg    MCHC 33.1 31.5 - 36.5  g/dL    RDW 14.6 10.0 - 15.0 %    Platelet Count 39 (LL) 150 - 450 10e9/L    Diff Method Manual Method    Comprehensive metabolic panel   Result Value Ref Range    Sodium 137 133 - 144 mmol/L    Potassium 4.4 3.4 - 5.3 mmol/L    Chloride 110 (H) 94 - 109 mmol/L    Carbon Dioxide 22 20 - 32 mmol/L    Anion Gap 6 3 - 14 mmol/L    Glucose 110 (H) 70 - 99 mg/dL    Urea Nitrogen 22 7 - 30 mg/dL    Creatinine 0.91 0.66 - 1.25 mg/dL    GFR Estimate 82 >60 mL/min/[1.73_m2]    GFR Estimate If Black >90 >60 mL/min/[1.73_m2]    Calcium 8.2 (L) 8.5 - 10.1 mg/dL    Bilirubin Total 1.1 0.2 - 1.3 mg/dL    Albumin 3.0 (L) 3.4 - 5.0 g/dL    Protein Total 6.6 (L) 6.8 - 8.8 g/dL    Alkaline Phosphatase 98 40 - 150 U/L    ALT 17 0 - 70 U/L    AST 12 0 - 45 U/L   Blood culture    Specimen: Arm, Right; Blood    Left Hand   Result Value Ref Range    Specimen Description Blood Left Hand     Culture Micro No growth after 1 hour    Blood culture    Specimen: Hand, Left; Blood    Right Arm   Result Value Ref Range    Specimen Description Blood Right Arm     Culture Micro No growth after 1 hour    Lactic acid whole blood   Result Value Ref Range    Lactic Acid 1.3 0.7 - 2.0 mmol/L   Troponin I   Result Value Ref Range    Troponin I ES <0.015 0.000 - 0.045 ug/L   Symptomatic COVID-19 Virus (Coronavirus) by PCR    Specimen: Nasopharyngeal   Result Value Ref Range    COVID-19 Virus PCR to [facility] - Source Nasopharyngeal     COVID-19 Virus PCR to [facility] - Result       Test received-See reflex to IDDL test SARS CoV2 (COVID-19) Virus RT-PCR   SARS-CoV-2 COVID-19 Virus (Coronavirus) RT-PCR Nasopharyngeal    Specimen: Nasopharyngeal   Result Value Ref Range    SARS-CoV-2 Virus Specimen Source Nasopharyngeal     SARS-CoV-2 PCR Result NEGATIVE     SARS-CoV-2 PCR Comment       This automated, real-time RT-PCR assay by [employer] has   been given Emergency Use Authorization (EUA) for the in vitro qualitative detection of  RNA   from the SARS-CoV2 virus in nasopharyngeal swabs in viral transport medium from patients   with signs and symptoms of infection who are suspected of COVID-19. The performance is   unknown in asymptomatic patients.     EKG 12-lead, tracing only   Result Value Ref Range    Interpretation ECG Click View Image link to view waveform and result    XR Chest Port 1 View    Narrative    XR CHEST PORT 1 VW  5/28/2020 8:20 PM    History:  cough, fever.     Comparison: Chest radiograph dated 4/24/2020    Findings:   80 degree AP chest radiograph. Cardiac silhouette is within normal  limits. Pulmonary vasculature is mildly indistinct. High lung volumes.  New right middle/lower lobe hazy opacities, favors to be infection.  Unchanged upper lobe fibrotic changes. No pneumothorax or significant  pleural effusion.      Impression    IMPRESSION:  New right middle/lower lobe focal hazy opacities, favored to be  pneumonia.    I have personally reviewed the examination and initial interpretation  and I agree with the findings.    HANK STILL MD   UA reflex to Microscopic and Culture    Specimen: Urine Midstream; Midstream Urine   Result Value Ref Range    Color Urine Yellow     Appearance Urine Clear     Glucose Urine Negative NEG^Negative mg/dL    Bilirubin Urine Negative NEG^Negative    Ketones Urine Negative NEG^Negative mg/dL    Specific Gravity Urine 1.019 1.003 - 1.035    Blood Urine Negative NEG^Negative    pH Urine 5.5 5.0 - 7.0 pH    Protein Albumin Urine Negative NEG^Negative mg/dL    Urobilinogen mg/dL Normal 0.0 - 2.0 mg/dL    Nitrite Urine Negative NEG^Negative    Leukocyte Esterase Urine Negative NEG^Negative    Source Midstream Urine      --  Rafa Quintanilla MD PhD  Hematology, Oncology, and Bone Marrow Transplantation Service, [facility], [place]  Pager: [phone]  Please see sticky note for cross cover information

## 2020-05-29 NOTE — PLAN OF CARE
Pt afebrile with stable vs. Continues to receive IV vanco and zosyn. Swallow study done. Regular diet with thin liquids ordered. Denies pain.

## 2020-05-29 NOTE — PROGRESS NOTES
"   05/29/20 1100   General Information   Onset Date 05/28/20   Start of Care Date 05/29/20   Referring Physician Rafa Quintanilla MD    Patient Profile Review/OT: Additional Occupational Profile Info See Profile for full history and prior level of function   Patient/Family Goals Statement None stated   Swallowing Evaluation Videofluoroscopic evaluation   Behaviorial Observations WFL (within functional limits)   Mode of current nutrition Oral diet   Type of oral diet Regular;Thin liquid   Respiratory Status Room air   Comments SLP: Pt is a 75 years old male with medical co morbidities significant for myelodysplastic syndrome  diagnosed 3/30/20,  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 Pt with hx of dysphagia in 2014, clear liquid diet with chin tuck recommended at that time. While a professional  was not available for today's eval, pt's daughter was on the phone for interview. Pt reports he does not modify his diet now but c/o \"choking\" with cookies and bread. VFSS completed per MD order.    Clinical Swallow Evaluation   Oral Musculature generally intact   Structural Abnormalities none present   Dentition   (many missing teeth, does not wear dentures)   Mucosal Quality good   Mandibular Strength and Mobility intact   Oral Labial Strength and Mobility WFL   Lingual Strength and Mobility WFL   Buccal Strength and Mobility intact   Laryngeal Function Cough;Throat clear;Swallow;Voicing initiated   Oral Musculature Comments WFL   VFSS Eval: Radiology   Radiologist Resident   Views Taken left lateral   Physical Location of Procedure [facility] [address]   VFSS Eval: Thin Liquid Texture Trial   Mode of Presentation, Thin Liquid cup;self-fed   Order of Presentation 1, 3, 5   Preparatory Phase WFL   Oral Phase, Thin Liquid WFL   Pharyngeal Phase, Thin Liquid " WFL   Rosenbek's Penetration Aspiration Scale: Thin Liquid Trial Results 2 - contrast enters airway, remains above the vocal cords, no residue remains (penetration)   Diagnostic Statement Flash penetration, no aspiration. Min residue in vallecula and pyriforms   VFSS Eval: Puree Solid Texture Trial   Mode of Presentation, Puree spoon;self-fed   Order of Presentation 2   Preparatory Phase WFL   Pharyngeal Phase, Puree WFL   Rosenbek's Penetration Aspiration Scale: Puree Food Trial Results 1 - no aspiration, contrast does not enter airway   Diagnostic Statement WFL, no penetration or aspiration   VFSS Eval: Solid Food Texture Trial   Mode of Presentation, Solid self-fed   Order of Presentation 4   Preparatory Phase WFL  (mildly prolonged 2/2 dentition)   Oral Phase, Solid WFL   Pharyngeal Phase, Solid Residue in valleculae   Rosenbek's Penetration Aspiration Scale: Solid Food Trial Results 1 - no aspiration, contrast does not enter airway   Diagnostic Statement Oral phase mildly prolonged 2/2 poor dentition. Pharyngeal phase characterized by mild residue after the swallow in the vallecula, cleared with liquid rinse.    Esophageal Phase of Swallow   Esophageal comments Small, non obstructing CP bar   Swallow Eval: Clinical Impressions   Skilled Criteria for Therapy Intervention No problems identified which require skilled intervention   Functional Assessment Scale (FAS) 7   Treatment Diagnosis Functional oropharyngeal swallow mechanism   Diet texture recommendations Regular diet;Thin liquids  (Pt self-selecting softer solids)   Recommended Feeding/Eating Techniques small sips/bites;alternate between small bites and sips of food/liquid  (slow rate, upright for all PO)   Demonstrates Need for Referral to Another Service dietitian   Risks and Benefits of Treatment have been explained. Yes   Patient, family and/or staff in agreement with Plan of Care Yes   Clinical Impression Comments SLP: [place] study completed per  MD order to objectively assess oropharyngeal swallow mechanism. Under fluoroscopy, pt presents with functional oropharyngeal swallow mechanism. Oral mech exam remarkable for multiple missing teeth, pt does not wear dentures. Pt assessed with thin liquids, pudding, and cookie. Oral phase characterized by prolonged but functional oral prep of cookie 2/2 poor dentition. Swallow trigger timely. Once triggered, velar elevation, BOT retraction, and pharyngeal constriction are WFL. Epiglottic inversion incomplete on first trial of thin, but consistently complete by end of exam. Flash penetration with thin liquids, no aspiration with any consistency. Recommend regular diet/thin liquids with pt self-selecting softer solids. Pt to be fully alert and upright for all PO, taking small bites/sips at slow rate. No additional SLP services indicated.    Total Evaluation Time   Total Evaluation Time (Minutes) 14

## 2020-05-29 NOTE — PHARMACY-VANCOMYCIN DOSING SERVICE
Pharmacy Vancomycin Initial Note  Date of Service May 28, 2020  Patient's  1945  75 year old, male    Indication: Community Acquired Pneumonia and Febrile Neutropenia    Current estimated CrCl = Estimated Creatinine Clearance: 70.6 mL/min (based on SCr of 0.91 mg/dL).    Creatinine for last 3 days  2020:  7:47 AM Creatinine 0.98 mg/dL  2020:  6:49 PM Creatinine 0.91 mg/dL    Recent Vancomycin Level(s) for last 3 days  No results found for requested labs within last 72 hours.      Vancomycin IV Administrations (past 72 hours)      No vancomycin orders with administrations in past 72 hours.                Nephrotoxins and other renal medications (From now, onward)    Start     Dose/Rate Route Frequency Ordered Stop    20 1000  vancomycin (VANCOCIN) 1000 mg in dextrose 5% 200 mL PREMIX      1,000 mg  200 mL/hr over 1 Hours Intravenous EVERY 12 HOURS 20 2200  vancomycin (VANCOCIN) 1,750 mg in sodium chloride 0.9 % 500 mL intermittent infusion      1,750 mg  over 2 Hours Intravenous ONCE 20            Contrast Orders - past 72 hours (72h ago, onward)    None                Plan:  1.  Start vancomycin  1750 mg IV x1 (25 mg/kg), followed by 1000 mg IV q12h (15 mg/kg).   2.  Goal Trough Level: 15-20  3.  Pharmacy will check trough levels as appropriate in 1-3 Days.    4. Serum creatinine levels will be ordered daily for the first week of therapy and at least twice weekly for subsequent weeks.    5. [place] method utilized to dose vancomycin therapy: Method 2    Bandar Gutierrez, Pharm.D.

## 2020-05-29 NOTE — PLAN OF CARE
[id number]:  Pt A&Ox4. BP (131-152/), provider notified. Tmax this shift 100.2F. On room air. Pt denies nausea and pain. Intermittent, loose cough w/out production of sputum overnight. PIV in left hand d/c'd due to bleeing and occlusion. No PIV inserted into right forearm. NS infusing 100mL/hr. Up independently in room to the bathroom. Voiding spontaneously. No BM this shift. Sputum and urine cultures need to be collected. Currently NPO while awaiting results of planned video swallow test. Continue to monitor and with POC.     Pt speaks [place] Island[place].  via Ipad used during encounters.

## 2020-05-29 NOTE — PLAN OF CARE
OT/7D:   Discharge Planner OT   Patient plan for discharge: Home with assist from wife.   Current status: Ipad  utilized. Limited OT evaluation completed. Patient reports IND with ADLs at baseline without AE. Declines OOB activity upon evaluation, but demonstrates antigravity strength in UE/LE's and IND with bed mobility. Anticipate OT will follow at lower frequency to provide HEP, education on lab value safety/activity, promote conditioning while in the hospital, and facilitate safe home discharge. Per RN, up SBA. Patient agreeable to progress ambulation with therapies tomorrow to further assess activity tolerance.   Barriers to return to prior living situation: Medical Status, Deconditioning  Recommendations for discharge: Anticipate home with assist and HEP, pending assessment of further OOB activity.   Rationale for recommendations: See above. Will update recommendations accordingly with progression of activity.        Entered by: Haydee Hui 05/29/2020 2:44 PM

## 2020-05-29 NOTE — ED NOTES
[facility], [place]   ED Nurse to Floor Handoff     Stew Crooks is a 75 year old male who speaks Kinyarwanda and lives with a spouse,  in a home  They arrived in the ED by car from home    ED Chief Complaint: Fever and Cough    ED Dx;   Final diagnoses:   None         Needed?: No    Allergies:   Allergies   Allergen Reactions     Blood Transfusion Related (Informational Only)      Developed hives and dizziness post blood transfusion on 4/8/20.   .  Past Medical Hx:   Past Medical History:   Diagnosis Date     Acute urinary retention     with anesthesia     History of blood transfusion      Lung cancer (H) 3/31/2014    adenosquamous carcinoma of lung- Pancoast tumor     Nausea with vomiting 6/26/2014     Other chronic pain     R shoulder, arm,back      Baseline Mental status: WDL  Current Mental Status changes: at basesline    Infection present or suspected this encounter: yes respiratory  Sepsis suspected: No  Isolation type: Special Precautions-COVID-19     Activity level - Baseline/Home:  Independent  Activity Level - Current:   Stand with Assist    Bariatric equipment needed?: No    In the ED these meds were given:   Medications   ceFEPIme (MAXIPIME) 2 g vial to attach to  ml bag for ADULTS or 50 ml bag for PEDS (2 g Intravenous New Bag 5/28/20 2117)   vancomycin (VANCOCIN) 1,750 mg in sodium chloride 0.9 % 500 mL intermittent infusion (has no administration in time range)   vancomycin (VANCOCIN) 1000 mg in dextrose 5% 200 mL PREMIX (has no administration in time range)   acetaminophen (TYLENOL) tablet 1,000 mg (1,000 mg Oral Given 5/28/20 1912)   0.9% sodium chloride BOLUS (0 mLs Intravenous Stopped 5/28/20 1940)       Drips running?  Yes  ABX  Home pump  No    Current LDAs  Peripheral IV 05/28/20 Left Hand (Active)   Site Assessment WDL 05/28/20 1851   Line Status Saline locked 05/28/20 1851   Phlebitis Scale 0-->no symptoms 05/28/20 1851   Infiltration Scale 0  05/28/20 1851   Infiltration Site Treatment Method  None 05/28/20 1851   Extravasation? No 05/28/20 1851   Dressing Intervention New dressing  05/28/20 1851   Number of days: 0       NG/OG Tube  Left nostril (Active)   Number of days:        Urethral Catheter Coude 16 fr (Active)   Number of days: 2064       Wound 10/03/14 Medial;Midline;Upper Neck Laceration (Active)   Number of days: 2064       Incision/Surgical Site 03/31/14 Left;Upper Chest (Active)   Number of days: 2250       Incision/Surgical Site 10/02/14 Anterior Neck (Active)   Number of days: 2065       Incision/Surgical Site 12/04/15 Left Chest (Active)   Number of days: 1637       Labs results:   Labs Ordered and Resulted from Time of ED Arrival Up to the Time of Departure from the ED   CBC WITH PLATELETS DIFFERENTIAL - Abnormal; Notable for the following components:       Result Value    WBC 1.0 (*)     RBC Count 2.68 (*)     Hemoglobin 7.9 (*)     Hematocrit 23.9 (*)     Platelet Count 39 (*)     All other components within normal limits   COMPREHENSIVE METABOLIC PANEL - Abnormal; Notable for the following components:    Chloride 110 (*)     Glucose 110 (*)     Calcium 8.2 (*)     Albumin 3.0 (*)     Protein Total 6.6 (*)     All other components within normal limits   COVID-19 VIRUS (CORONAVIRUS) BY PCR   LACTIC ACID WHOLE BLOOD   TROPONIN I   SARS-COV-2 (COVID-19) VIRUS RT-PCR   UA MACROSCOPIC WITH REFLEX TO MICRO AND CULTURE   BLOOD CULTURE   BLOOD CULTURE       Imaging Studies:   Recent Results (from the past 24 hour(s))   XR Chest Port 1 View    Narrative    XR CHEST PORT 1 VW  5/28/2020 8:20 PM    History:  cough, fever.     Comparison: Chest radiograph dated 4/24/2020    Findings:   80 degree AP chest radiograph. Cardiac silhouette is within normal  limits. Pulmonary vasculature is mildly indistinct. High lung volumes.  New right middle/lower lobe hazy opacities, favors to be infection.  Unchanged upper lobe fibrotic changes. No pneumothorax or  "significant  pleural effusion.      Impression    IMPRESSION:  New right middle/lower lobe focal hazy opacities, favored to be  pneumonia.    I have personally reviewed the examination and initial interpretation  and I agree with the findings.    HANK STILL MD       Recent vital signs:   /65   Pulse 84   Temp 100.1  F (37.8  C) (Oral)   Resp 30   Ht 1.753 m (5' 9\")   Wt 71.2 kg (157 lb)   SpO2 97%   BMI 23.18 kg/m      Breann Coma Scale Score: 15 (05/28/20 1920)       Cardiac Rhythm: Normal Sinus  Pt needs tele? No  Skin/wound Issues: None    Code Status: Full Code    Pain control: pt had none    Nausea control: pt had none    Abnormal labs/tests/findings requiring intervention: fever cough in setting of cancer    Family present during ED course? No   Family Comments/Social Situation comments: Daughter Karen updated on results and plan for admission    Tasks needing completion: None      [phone] [facility] ED      "

## 2020-05-29 NOTE — PLAN OF CARE
Nursing Focus: Admission  D: Arrived at 0150 from ED via transport. Admitted for cough and fever.     I: Admission process began.  Patient oriented to room, enviroment, call light.  Md. notified of patients arrival on unit.     A: /112. Other vital signs stable, afebrile.  Patient stable at this time.      P: Implement plan of care when available. Continue to monitor patient. Nursing interventions as appropriate. Notify md with changes in pt status.

## 2020-05-30 NOTE — PLAN OF CARE
3130-2472: Soft DPs (111/37 and 118/45) - provider notified and aware. Afebrile, OVSS on RA. Denies pain/nausea/SOB. Continues with IV vanco and zosyn. Bhutanese speaking, iPad  used at bedside. Up independently to the bathroom, voiding spontaneously. Sleeping between cares. Continue to monitor and with POC.

## 2020-05-30 NOTE — PLAN OF CARE
Vitals stable on room air. Continues on IV zosyn and vanco. Good oral intake. Voiding spontaneously. BM today. Encouraging ambulation.

## 2020-05-30 NOTE — PROGRESS NOTES
[facility], [place]    Hematology / Oncology Progress Note    Date of Service (when I saw the patient): 05/30/2020     Assessment & Plan   Mr. 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 The most likely source of his fever is right middle and lower lobe pneumonia given CXR findings. UA is negative, and blood cultures are negative to date. Urinary studies negative.     Plans for Today:   - Continue zosyn, discontinue vancomycin  - Fluconazole 200mg daily for anti-fungal ppx w/ ANC < 1.0  - CXR with strong suspicion for PNA, BCx NGTD, UA/Urinary antigens negative, sputum cx/gram stain, MRSA cx collected and pending  - MIVFs 100mL/h started in setting of NPO prior to swallow study, consider discontinuing based on ongoing PO intake  - Regular diet   - PT/OT consulted for discharge planning  - Continue to monitor for any ongoing fevers/infectious sx  - Labs and MEAGAN visit tentatively scheduled 6/2       Neutropenic fever    # Community Acquired Pneumonia   Patient presented to ED after fever at home >100F. Upon ED arrival, Tmax of 101. Started on cefepime/vanco, infectious studies sent per below. Ongoing loose cough, otherwise no other localizing infectious sx.   - BCx NGTD  - Will start Vancomycin and Zosyn  - Holding Levaquin prophylaxis   - Sputum gram stain, sputum cx, MRSA cx pending  - UA negative, urinary antigens negative  - Swallow study today, SLP ordered. Start NaCL 100ml/hr meanwhile.     Flash penetration w/ fluids, no aspiration with any consistency. Recommend regular diet/thin liquids w/ pt self-selecting softer foods.      # Myelodysplastic syndrome  # Pancytopenia 2/2 chemotherapy and underlying  disease  Myelodysplastic syndrome with 5.6% blasts, diagnosed 3/30/20.  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  No FLT3 ITD, but not enough sample for D835 analysis  - History of pancytopenia, December 2015, with negative evaluation except for bone marrow biopsy possibly suspicious for MDS. Normal cytogenetics  - Initiated treatment with azacitidine 4/19/20, in attempts to decrease in his need for transfusion and to improve his quality of life.   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      # Nausea  # GERD   - Will continue pepcid, compazine and zofran (Home meds)      #PE   Not on AC      FEN  - IVFs: encourage po as able  - Diet: regular diet as tolerated  - Lytes repleted as per sliding scale     PPX  - DVT: holding heparin given that platelets are less than 50K  - Bowel: senna-colace, miralax prn  - GI: continue PTA PPI     Lines/Drains: PIV  CODE: Full discussed with patient   DISPO: inpatient > 2 nights for workup and management of neutropenic fever  Consults: PT/OT for discharge planning  Follow-up/Referrals: Primary oncologist is Dr. Pearce. Labs and MEAGAN visit tentatively scheduled for 6/2.    Patient is seen and examined by Dr. Tavarez.  Assessment and plan are discussed and delivered to the patient.    Aby Juarez PA-C  Hematology/Oncology  Pager: [phone]    Interval History   Patient feeling tired but otherwise okay this morning. Spoke on phone with son Sunny as well. Patient reports no concerns or complaints. We reviewed lab values for this morning with  him and his son. No acute events overnight. Nursing notes reviewed. No dysphagia, happy about regular diet.       Physical Exam   Temp: 97.6  F (36.4  C) Temp src: Oral BP: 129/43 Pulse: 75 Heart Rate: 76 Resp: 20 SpO2: 94 % O2 Device: None (Room air)    Vitals:    05/28/20 1826 05/29/20 0909 05/30/20 0730   Weight: 71.2 kg (157 lb) 69.4 kg (153 lb 1.6 oz) 68.9 kg (151 lb 14.4 oz)     Vital Signs with Ranges  Temp:  [97.3  F (36.3  C)-98.8  F (37.1  C)] 97.6  F (36.4  C)  Pulse:  [75] 75  Heart Rate:  [72-86] 76  Resp:  [18-20] 20  BP: ()/(37-53) 129/43  SpO2:  [94 %-96 %] 94 %  I/O last 3 completed shifts:  In: 2708.33 [P.O.:480; I.V.:2228.33]  Out: 600 [Urine:600]    General: Awake and interactive. No acute distress. Well developed. Appears stated age.   Skin: Warm, dry, and intact without rashes or lesions.   Head: Normocephalic and atraumatic.  HEENT: PERRLA. Sclera non-icteric. EOM intact. Oral mucosa is pink and moist with no lesions, thrush, or exudates.   Cardiac: Regular rate and rhythm. Normal S1 and S2. No murmurs, rubs, or gallops.   Respiratory: No signs of respiratory distress or accessory muscle use. Diffuse wheezing bilaterally. Crackles R lung at base.   Abd: Soft, symmetric, and non-tender without distension. BS present and normoactive. No masses or organomegaly.   Extremities: No swelling or erythema. Distal pulses palpable.   Neuro: A&Ox3. Indonesian speaking-normal speech. Memory and thought process preserved. Grossly non-focal.  Psych: Appropriate mood and affect. Good judgment and insight. No visual/auditory hallucinations.     Medications     - MEDICATION INSTRUCTIONS -       sodium chloride 100 mL/hr at 05/30/20 0442       docusate sodium  100 mg Oral BID     famotidine  20 mg Oral Daily     fluconazole  200 mg Oral Daily     fluticasone-vilanterol  1 puff Inhalation Daily     piperacillin-tazobactam  3.375 g Intravenous Q6H     vancomycin (VANCOCIN) IV  1,000 mg Intravenous Q12H        Data   Results for orders placed or performed during the hospital encounter of 05/28/20 (from the past 24 hour(s))   XR Video Swallow with SLP or OT    Narrative    Examination:  Modified Barium Swallow Study with Speech Pathology    Comparison: 11/11/2014    History: Assess for signs of aspiration    Fluoroscopy time: 1.5 minutes.    Findings: Under fluoroscopic guidance, the patient was given orally  administered barium of varying consistencies in the presence of the  speech pathology service.      The oral phase was normal. There is no delay in swallowing or pooling  of contrast. There is aspiration with any consistency of barium.  Intermittent flash penetration with thin barium. Vallecular and  piriform sinus residue.      Impression    Impression: Intermittent flash penetration with thin barium. No  aspiration. Please see the speech pathologist report for further  details regarding the modified barium swallow study portion of the  examination.    I have personally reviewed the examination and initial interpretation  and I agree with the findings.    LAUREN SHRESTHA, DO   CBC with platelets differential   Result Value Ref Range    WBC 1.0 (L) 4.0 - 11.0 10e9/L    RBC Count 2.44 (L) 4.4 - 5.9 10e12/L    Hemoglobin 7.0 (L) 13.3 - 17.7 g/dL    Hematocrit 21.6 (L) 40.0 - 53.0 %    MCV 89 78 - 100 fl    MCH 28.7 26.5 - 33.0 pg    MCHC 32.4 31.5 - 36.5 g/dL    RDW 14.6 10.0 - 15.0 %    Platelet Count 29 (LL) 150 - 450 10e9/L    Diff Method Manual Differential     % Neutrophils 52.0 %    % Lymphocytes 38.8 %    % Monocytes 0.0 %    % Eosinophils 1.0 %    % Basophils 0.0 %    % Metamyelocytes 5.1 %    % Blasts 3.1 %    Absolute Neutrophil 0.5 (L) 1.6 - 8.3 10e9/L    Absolute Lymphocytes 0.4 (L) 0.8 - 5.3 10e9/L    Absolute Monocytes 0.0 0.0 - 1.3 10e9/L    Absolute Eosinophils 0.0 0.0 - 0.7 10e9/L    Absolute Basophils 0.0 0.0 - 0.2 10e9/L    Absolute Metamyelocytes 0.1 (H) 0 10e9/L    Absolute Blasts 0.0 0 10e9/L     Poikilocytosis Slight     Ovalocytes Slight     Celina Cells Slight    Basic metabolic panel   Result Value Ref Range    Sodium 141 133 - 144 mmol/L    Potassium 4.0 3.4 - 5.3 mmol/L    Chloride 112 (H) 94 - 109 mmol/L    Carbon Dioxide 22 20 - 32 mmol/L    Anion Gap 7 3 - 14 mmol/L    Glucose 105 (H) 70 - 99 mg/dL    Urea Nitrogen 10 7 - 30 mg/dL    Creatinine 1.09 0.66 - 1.25 mg/dL    GFR Estimate 66 >60 mL/min/[1.73_m2]    GFR Estimate If Black 76 >60 mL/min/[1.73_m2]    Calcium 8.2 (L) 8.5 - 10.1 mg/dL

## 2020-05-30 NOTE — PLAN OF CARE
"BP (!) 147/63 (BP Location: Left arm)   Pulse 72   Temp 96.4  F (35.8  C) (Oral)   Resp 20   Ht 1.753 m (5' 9\")   Wt 68.9 kg (151 lb 14.4 oz)   SpO2 95%   BMI 22.43 kg/m      1530 - 1930    Neuro: A&Ox4.   Cardiac: SR. VSS.   Respiratory: Sating 95% on RA.  GI/: No bm. Adequate urine output.   Diet/appetite: Tolerating regular diet. Eating well.  Activity:  Independent up to chair and bathroom.  Pain: At acceptable level on current regimen.   Skin: No new deficits noted.  LDA's:PIV infusing NS at 100 ml/hr.  New this shift : Pt finished the one unit of PRBC that was started on the day shift without any problem.  Plan: Continue with POC. Notify primary team with changes.  "

## 2020-05-30 NOTE — PLAN OF CARE
Discharge Planner OT   Patient plan for discharge: Did not discuss  Current status: IND with bed mobility, donned shoes sitting EOB with SBA. Ambulated ~200 feet with SBA using IV pole, no LOB noted. Educated on platelet values and implication on ADLs.   Barriers to return to prior living situation: Medical Status, Deconditioning  Recommendations for discharge: Anticipate home with assist and HEP  Rationale for recommendations: To increase IND in ADLs/IADLs        Entered by: Laila Casarez 05/30/2020 11:42 AM

## 2020-05-30 NOTE — PHARMACY-VANCOMYCIN DOSING SERVICE
Pharmacy Vancomycin Note  Date of Service May 30, 2020  Patient's  1945   75 year old, male    Indication: Community Acquired Pneumonia and Febrile Neutropenia  Goal Trough Level: 15-20 mg/L  Day of Therapy: 3  Current Vancomycin regimen: 1000 mg IV q12h    Current estimated CrCl = Estimated Creatinine Clearance: 57.1 mL/min (based on SCr of 1.09 mg/dL).    Creatinine for last 3 days  2020:  6:49 PM Creatinine 0.91 mg/dL  2020:  3:12 AM Creatinine 0.97 mg/dL  2020:  5:47 AM Creatinine 1.09 mg/dL    Recent Vancomycin Levels (past 3 days)  2020:  8:59 AM Vancomycin Level 17.1 mg/L    Vancomycin IV Administrations (past 72 hours)                   vancomycin (VANCOCIN) 1000 mg in dextrose 5% 200 mL PREMIX (mg) 1,000 mg New Bag 20 0955     1,000 mg New Bag 20 2205     1,000 mg New Bag  1019    vancomycin (VANCOCIN) 1,750 mg in sodium chloride 0.9 % 500 mL intermittent infusion (mg) 1,750 mg New Bag 20 2252                Nephrotoxins and other renal medications (From now, onward)    Start     Dose/Rate Route Frequency Ordered Stop    20 0230  piperacillin-tazobactam (ZOSYN) 3.375 g vial to attach to  mL bag      3.375 g  over 30 Minutes Intravenous EVERY 6 HOURS 20 0156               Contrast Orders - past 72 hours (72h ago, onward)    Start     Dose/Rate Route Frequency Ordered Stop    20 1115  barium sulfate 40% (VARIBAR HONEY or NECTAR) oral suspension  Status:  Discontinued       Oral ONCE 20 1038 20 1125    20 1115  barium sulfate (VARIBAR) 40 % pudding/paste 10 mL      10 mL Oral ONCE 20 1038 20 1123    20 1115  barium sulfate (VARIBAR THIN Liquid) 40 % oral suspension 4 g      10 mL Oral ONCE 20 1038 20 1122          Interpretation of levels and current regimen:  Trough level is  Therapeutic    Has serum creatinine changed > 50% in last 72 hours: No    Urine output:  unable to determine    Renal  Function: Stable    Plan:  Vancomycin has since been discontinued. If restarted, current dosing appears appropriate for goal trough 15-20 mg/L.     Brunilda Hall (Sasha), PharmD, [facility]  PGY-1 Pharmacy Resident

## 2020-05-31 NOTE — PLAN OF CARE
Time: 1464-6446    Reason for Admission: Neutropenic Fever d/t right middle and lower pneumonia.    Activity: Independent    Neuro: A&O x4. Emirati speaking.    GI/: Voiding spontaneously without difficulty.     Diet: Regular, tolerating well.    Incisions/Drains: None    IV Access: R PIV saline locked.     Labs: Lactic Acid: 2.1 (MD paged and stated pt not Septic at this time).    Vitals: Hypertensive but within range. AOVSS on RA.    Pain: Pt denied any pain or nausea this shift.     New changes this shift: Pt broke out in itching rash/hives at 1530. PRN benadryl and atarax given with relief. MD paged and discontinued ordered PO Bactrim and started PO Levaquin.    Plan: Waiting on stool sample results. Continue with plan of care.

## 2020-05-31 NOTE — DISCHARGE SUMMARY
[facility], [place]  Discharge Summary - Malignant Hematology       Date of Admission:  5/28/2020  Date of Discharge:  5/31/2020  Discharging Provider: Carl  Discharge Service: Malignant Hematology    Discharge Diagnoses   Stenotrophomonas pneumonia    Follow-ups Needed After Discharge   Follow-up Appointments     Adult [facility] Follow-up and recommended labs and tests      6/1 Labs at the [facility]  6/2 11:10 AM appointment with Danielle LYNN)    Appointments on [place] and/or [facility] (with [facility] or [facility]   provider or service). Call [phone] if you haven't heard regarding   these appointments within 7 days of discharge.             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 The most likely source of his fever is right middle and lower lobe pneumonia given CXR findings, with sputum cultures growing stenotrophomonas.  He was admitted on 5/28/2020.  The following problems were addressed during his hospitalization:    # Neutropenic fever    # Community Acquired Pneumonia   Patient presented to ED after fever at home >100F. Upon ED arrival, Tmax of 101. Started on cefepime/vanco, infectious studies sent per below. Ongoing loose cough, otherwise no other localizing infectious sx. Urinary  studies negative. BCx NGTD, sputum cultures resulted stenotrophomonas on day of discharge, so he was discharged on BACTRIM, DS 5 tabs/day for 7 days.     # Concern for aspiration  SLP evaluation with flash penetration w/ fluids, no aspiration with any consistency. Recommend regular diet/thin liquids w/ pt self-selecting softer foods.      # Myelodysplastic syndrome  # Pancytopenia 2/2 chemotherapy and underlying disease  Myelodysplastic syndrome with 5.6% blasts, diagnosed 3/30/20.  IPSS-R 4.5  Intermediate- based on normal cytogenetics, Hgb <7, normal WBC and platelets. 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  No FLT3 ITD, but not enough sample for D835 analysis. Initiated treatment with azacitidine 4/19/20, in attempts to decrease in his need for transfusion and to improve his quality of life.   - Started fluconazole 200mg daily for anti-fungal ppx in setting of neutropenia    Consultations This Hospital Stay   PHARMACY TO DOSE VANCO  PHARMACY TO DOSE VANCO  VASCULAR ACCESS CARE ADULT IP CONSULT  OCCUPATIONAL THERAPY ADULT IP CONSULT  PHYSICAL THERAPY ADULT IP CONSULT    Code Status   Full Code       The patient was discussed with Dr. Chase Paul MD  Medicine/Dermatology PGY-5  p [phone]    ______________________________________________________________________    Physical Exam   Vital Signs: Temp: 97.7  F (36.5  C) Temp src: Oral BP: (!) 143/57 Pulse: 62 Heart Rate: 73 Resp: 20 SpO2: 93 % O2 Device: None (Room air)    Weight: 152 lbs 14.4 oz  General: Awake and interactive. No acute distress. Well developed. Appears stated age.   Skin: Warm, dry, and intact without rashes or lesions.   Head: Normocephalic and atraumatic.  HEENT: PERRLA. Sclera non-icteric. EOM intact. Oral mucosa is pink and moist with no lesions, thrush, or exudates.   Cardiac: Regular rate and rhythm. Normal S1 and S2. No murmurs, rubs, or gallops.   Respiratory: No signs of respiratory distress or accessory muscle use. Diffuse  wheezing bilaterally. Crackles R lung at base.   Abd: Soft, symmetric, and non-tender without distension. BS present and normoactive. No masses or organomegaly.   Extremities: No swelling or erythema. Distal pulses palpable.   Neuro: A&Ox3. Serbian speaking-normal speech. Memory and thought process preserved. Grossly non-focal.  Psych: Appropriate mood and affect. Good judgment and insight. No visual/auditory hallucinations.       Primary Care Physician   Rani Flores    Discharge Orders      Reason for your hospital stay    You were admitted for pneumonia, growing a bacteria called stenotrophomonas. You were treated with antibiotics vancomycin and zosyn, and you will be discharged on an additional 7 days of Bactrim (until 6/7). You will take the bactrim 3 times a day, 1 pill in the morning, 2 pills at noon, and 2 pills at night. While you are on the bactrim you should hold your levofloxacin. Resume the levofloxacin when this is done (on 6/8). You did a speech and swallow evaluation which showed you can have a regular diet.     fost admis pentru pneumonie, crescând o bacterie numita stenotrophomonas.  fost tratat cu antibiotice vancomicina ?i zosin ?i vei fi externat înca 7 zile de Bactrim (pâna la 6/7). Ve?i maya bactrimul de 3 dionna pe zi, 1 pastila diminea?a, 2 pastile la prânz ?i 2 pastile noaptea. În timp ce sunte?i pe bactrim, ar trebui sa va ?ine?i levofloxacina. Relua?i levofloxacina atunci când se face acest lucru (la 6/8). A?i facut o evaluare de vorbire ?i înghi?ingrid care a demonstrat ca pute?i avea o dieta regulata.     Adult [facility] Follow-up and recommended labs and tests    [place]1 [place] [facility]  6/2 11:10 AM appointment with Danielle LYNN)    Appointments on [place] and/or [facility] (with [facility] or [facility] provider or service). Call [phone] if you haven't heard regarding these appointments within 7 days of discharge.     Activity    Your activity upon discharge: activity as tolerated      Full Code     Diet    Follow this diet upon discharge: Orders Placed This Encounter      Snacks/Supplements Adult: Boost Shake; With Meals      Room Service      Regular Diet Adult       Significant Results and Procedures   Most Recent 3 CBC's:  Recent Labs   Lab Test 05/31/20  0524 05/30/20  0547 05/29/20  0312   WBC 1.1* 1.0* 1.1*   HGB 7.7* 7.0* 7.5*   MCV 89 89 92   PLT 29* 29* 33*     Most Recent 3 BMP's:  Recent Labs   Lab Test 05/31/20  0524 05/30/20  0547 05/29/20  0312    141 139   POTASSIUM 4.3 4.0 4.2   CHLORIDE 113* 112* 112*   CO2 22 22 20   BUN 14 10 20   CR 1.06 1.09 0.97   ANIONGAP 8 7 7   ALFREDO 8.1* 8.2* 8.0*   * 105* 114*     Most Recent 2 LFT's:  Recent Labs   Lab Test 05/28/20  1849 05/27/20  0747   AST 12 9   ALT 17 20   ALKPHOS 98 99   BILITOTAL 1.1 0.7       Discharge Medications   Current Discharge Medication List      START taking these medications    Details   fluconazole (DIFLUCAN) 200 MG tablet Take 1 tablet (200 mg) by mouth daily  Qty: 30 tablet, Refills: 1    Associated Diagnoses: Pneumonia of right middle lobe due to infectious organism (H)      sulfamethoxazole-trimethoprim (BACTRIM DS) 800-160 MG tablet Take 1 in the morning, 2 at noon, and 2 at night.  Qty: 35 tablet, Refills: 0    Associated Diagnoses: Pneumonia of right middle lobe due to infectious organism (H)         CONTINUE these medications which have NOT CHANGED    Details   ALBUTEROL 108 (90 BASE) MCG/ACT inhaler INHALE 2 PUFFS BY MOUTH EVERY 4 HOURS AS NEEDED  Refills: 3      DULERA 100-5 MCG/ACT oral inhaler INHALE 2 PUFFS BY MOUTH TWICE DAILY  Refills: 3      famotidine (PEPCID) 20 MG tablet       levofloxacin (LEVAQUIN) 250 MG tablet Take 1 tablet (250 mg) by mouth daily  Qty: 30 tablet, Refills: 1    Associated Diagnoses: MDS (myelodysplastic syndrome) (H)      LORazepam (ATIVAN) 0.5 MG tablet Take 1 tablet (0.5 mg) by mouth every 4 hours as needed (Anxiety, Nausea/Vomiting or Sleep)  Qty: 30 tablet,  Refills: 5    Associated Diagnoses: MDS (myelodysplastic syndrome) (H)      ondansetron (ZOFRAN) 8 MG tablet Take 1 tablet (8 mg) by mouth every 8 hours as needed (Nausea/Vomiting)  Qty: 10 tablet, Refills: 5    Associated Diagnoses: MDS (myelodysplastic syndrome) (H)      prochlorperazine (COMPAZINE) 10 MG tablet Take 0.5 tablets (5 mg) by mouth every 6 hours as needed (Nausea/Vomiting)  Qty: 30 tablet, Refills: 5    Associated Diagnoses: MDS (myelodysplastic syndrome) (H)           Allergies   Allergies   Allergen Reactions     Blood Transfusion Related (Informational Only)      Developed hives and dizziness post blood transfusion on 4/8/20.

## 2020-05-31 NOTE — PLAN OF CARE
Discharge Planner PT   Patient plan for discharge: Did not discuss  Current status: PT DEFFERED- Per chart review and discussion with OT, pt is IND with bed mobility, donned shoes sitting EOB with SBA. Ambulated ~200 feet with SBA using IV pole, no LOB noted. No falls history/stairs to enter home. No current IP PT needs at this time, PT order completed. OT following during admission to avoid deconditioning, provide education for lab values and acitivity modifications.  Barriers to return to prior living situation: Medical Status, Deconditioning  Recommendations for discharge: Anticipate home with assist and HEP  Rationale for recommendations: To increase IND in ADLs/IADLs

## 2020-05-31 NOTE — PROGRESS NOTES
[facility], [place]    Hematology / Oncology Progress Note    Date of Service (when I saw the patient): 05/31/2020     Assessment & Plan   Mr. 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 The most likely source of his fever is right middle and lower lobe pneumonia given CXR findings. UA is negative, and blood cultures are negative to date. Urinary studies negative.     Plans for Today:   - Growing stenotrophomonas, started bactrim  - Discontinue zosyn  - C. Diff pending     Neutropenic fever    # Community Acquired Pneumonia   Patient presented to ED after fever at home >100F. Upon ED arrival, Tmax of 101. Started on cefepime/vanco, infectious studies sent per below. Ongoing loose cough, otherwise no other localizing infectious sx.   - Growing stenotrophomonas, started bactrim  - Discontinue zosyn  - C. Diff pending  - Swallow study today, SLP ordered. Start NaCL 100ml/hr meanwhile.     Flash penetration w/ fluids, no aspiration with any consistency. Recommend regular diet/thin liquids w/ pt self-selecting softer foods.      # Myelodysplastic syndrome  # Pancytopenia 2/2 chemotherapy and underlying disease  Myelodysplastic syndrome with 5.6% blasts, diagnosed 3/30/20.  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  No FLT3 ITD, but not enough sample for D835 analysis  - History of pancytopenia, December 2015, with negative evaluation except for bone marrow biopsy possibly suspicious for MDS. Normal cytogenetics  - Initiated treatment with azacitidine 4/19/20, in attempts to decrease in his need for transfusion and to improve his  quality of life.   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      # Nausea  # GERD   - Will continue pepcid, compazine and zofran (Home meds)      #PE   Not on AC      FEN  - IVFs: encourage po as able  - Diet: regular diet as tolerated  - Lytes repleted as per sliding scale     PPX  - DVT: holding heparin given that platelets are less than 50K  - Bowel: senna-colace, miralax prn  - GI: continue PTA PPI     Lines/Drains: PIV  CODE: Full discussed with patient   DISPO: inpatient > 2 nights for workup and management of neutropenic fever  Consults: PT/OT for discharge planning  Follow-up/Referrals: Primary oncologist is Dr. Pearce. Labs and [facility] visit tentatively scheduled for 6/2.    Patient is seen and examined by Dr. Tavarez.  Assessment and plan are discussed and delivered to the patient.    Maureen Paul MD  Medicine/Dermatology PGY-5  p [phone]      Interval History   Patient feeling tired but otherwise okay this morning. Spoke on phone with son Sunny as well. Patient reports no concerns or complaints. Later in the morning he reported diarrhea to the fellow and attending. C. Diff was sent.     Physical Exam   Temp: 97.7  F (36.5  C) Temp src: Oral BP: (!) 143/57 Pulse: 62 Heart Rate: 73 Resp: 20 SpO2: 93 % O2 Device: None (Room air)    Vitals:    05/29/20 0909 05/30/20 0730 05/31/20 0732   Weight: 69.4 kg (153 lb 1.6 oz) 68.9 kg (151 lb 14.4 oz) 69.4 kg (152 lb 14.4 oz)     Vital Signs with Ranges  Temp:  [96.4  F (35.8  C)-98.7  F (37.1  C)] 97.7  F (36.5  C)  Pulse:  [62-77] 62  Heart Rate:  [73-81] 73  Resp:  [16-20] 20  BP:  (130-148)/(50-63) 143/57  SpO2:  [93 %-96 %] 93 %  I/O last 3 completed shifts:  In: 300   Out: -     General: Awake and interactive. No acute distress. Well developed. Appears stated age.   Skin: Warm, dry, and intact without rashes or lesions.   Head: Normocephalic and atraumatic.  HEENT: PERRLA. Sclera non-icteric. EOM intact. Oral mucosa is pink and moist with no lesions, thrush, or exudates.   Cardiac: Regular rate and rhythm. Normal S1 and S2. No murmurs, rubs, or gallops.   Respiratory: No signs of respiratory distress or accessory muscle use. Diffuse wheezing bilaterally. Crackles R lung at base.   Abd: Soft, symmetric, and non-tender without distension. BS present and normoactive. No masses or organomegaly.   Extremities: No swelling or erythema. Distal pulses palpable.   Neuro: A&Ox3. South African speaking-normal speech. Memory and thought process preserved. Grossly non-focal.  Psych: Appropriate mood and affect. Good judgment and insight. No visual/auditory hallucinations.     Medications     - MEDICATION INSTRUCTIONS -         docusate sodium  100 mg Oral BID     famotidine  20 mg Oral Daily     fluconazole  200 mg Oral Daily     fluticasone-vilanterol  1 puff Inhalation Daily     piperacillin-tazobactam  3.375 g Intravenous Q6H       Data   Results for orders placed or performed during the hospital encounter of 05/28/20 (from the past 24 hour(s))   CBC with platelets differential   Result Value Ref Range    WBC 1.1 (L) 4.0 - 11.0 10e9/L    RBC Count 2.66 (L) 4.4 - 5.9 10e12/L    Hemoglobin 7.7 (L) 13.3 - 17.7 g/dL    Hematocrit 23.7 (L) 40.0 - 53.0 %    MCV 89 78 - 100 fl    MCH 28.9 26.5 - 33.0 pg    MCHC 32.5 31.5 - 36.5 g/dL    RDW 14.6 10.0 - 15.0 %    Platelet Count 29 (LL) 150 - 450 10e9/L    Diff Method Manual Differential     % Neutrophils 55.3 %    % Lymphocytes 39.3 %    % Monocytes 1.8 %    % Eosinophils 1.8 %    % Basophils 0.9 %    % Myelocytes 0.9 %    Absolute Neutrophil 0.6 (L) 1.6 - 8.3  10e9/L    Absolute Lymphocytes 0.4 (L) 0.8 - 5.3 10e9/L    Absolute Monocytes 0.0 0.0 - 1.3 10e9/L    Absolute Eosinophils 0.0 0.0 - 0.7 10e9/L    Absolute Basophils 0.0 0.0 - 0.2 10e9/L    Absolute Myelocytes 0.0 0 10e9/L    Poikilocytosis Slight     RBC Fragments Slight     Ovalocytes Slight     Platelet Estimate Confirming automated cell count    Basic metabolic panel   Result Value Ref Range    Sodium 143 133 - 144 mmol/L    Potassium 4.3 3.4 - 5.3 mmol/L    Chloride 113 (H) 94 - 109 mmol/L    Carbon Dioxide 22 20 - 32 mmol/L    Anion Gap 8 3 - 14 mmol/L    Glucose 109 (H) 70 - 99 mg/dL    Urea Nitrogen 14 7 - 30 mg/dL    Creatinine 1.06 0.66 - 1.25 mg/dL    GFR Estimate 68 >60 mL/min/[1.73_m2]    GFR Estimate If Black 79 >60 mL/min/[1.73_m2]    Calcium 8.1 (L) 8.5 - 10.1 mg/dL

## 2020-05-31 NOTE — PLAN OF CARE
"[id number]    BP (!) 141/55 (BP Location: Left arm)   Pulse 67   Temp 98.7  F (37.1  C) (Oral)   Resp 16   Ht 1.753 m (5' 9\")   Wt 68.9 kg (151 lb 14.4 oz)   SpO2 96%   BMI 22.43 kg/m      South [place]ese speaking, iPad  utilized. BP elevated, not within parameters to notify. Afebrile. LS clear, on room air. Occasional dry cough. Denies SOB, nausea and pain. Declined scheduled Dulcolax, reports multiple loose stools. Reg diet, appetite good. PIV infusing fluids. UpAdLib. Will continue with POC.     "

## 2020-05-31 NOTE — PROGRESS NOTES
Sepsis Evaluation Progress Note    I was called to see Stew Crooks due to abnormal vital signs triggering the Sepsis SIRS screening alert. He is known to have an infection.     Physical Exam   Vital Signs:  Temp: 95.8  F (35.4  C) Temp src: Oral BP: (!) 155/72 Pulse: 73 Heart Rate: 73 Resp: 22 SpO2: 97 % O2 Device: None (Room air)      Lab:  Lactic Acid   Date Value Ref Range Status   05/28/2020 1.3 0.7 - 2.0 mmol/L Final     Lactate for Sepsis Protocol   Date Value Ref Range Status   05/31/2020 2.1 (H) 0.7 - 2.0 mmol/L Final     Comment:     Critical result called to and read back by JERE Hoover to AMAYA ([id number]) at   15:50. 05/31/2020         The patient is at baseline mental status.     The rest of their physical exam is significant for no significant changes since last documented exam.    Assessment & Plan   NO EVIDENCE OF SEPSIS at this time.  Vital sign, physical exam, and lab findings are likely due to malignancy and treatment.    Disposition: The patient will remain on the current unit. We will continue to monitor this patient closely..  Milad Quinones MD    Sepsis Criteria   Sepsis: 2+ SIRS criteria due to infection  Severe Sepsis: Sepsis AND 1+ new sign of acute organ dysfunction (Note: lactate >2 is organ dysfunction)  Septic Shock: Sepsis AND hypotension despite volume resuscitation with 30 ml/kg crystalloid

## 2020-06-01 NOTE — PLAN OF CARE
[id number]: BP elevated to 161/65. Also with intermittently soft DBP. Denies dizziness/lightheadedness. Afebrile, OVSS on RA. Denies pain/nausea. At beginning of shift pt felt his rash and itchiness had improved. Around 0220 pt c/o hives/rash returning along with mild itchiness. Pt received PO Benadryl x1 and provider notified. VSS and pt denied chest tightness/SOB/trouble breathing. Per provider, plan is to continue to monitor as this could be a rebound reaction from Bactrim earlier and the IV Benadryl wearing off. Pt states the itchiness has improved, still has hives on face and hands but pt states they aren't causing discomfort. C.diff negative. Immodium given x1 for diarrhea. Pt states loose stools have slowed, no BM this shift. Voiding spontaneously, not saving urine. English iPad  used at the bedside. Pt up independently. Continue to monitor and with POC.

## 2020-06-01 NOTE — DISCHARGE SUMMARY
[facility], [facility] Discharge Summary         Hematology / Oncology    Date of Admission:  5/28/2020  Date of Discharge:  6/1/2020  Discharging Provider: Aby Juarez  Date of Service (when I saw the patient): 06/01/20        Discharge Diagnoses:      - Stenotrophomonas pneumonia  - Myelodysplastic syndrome    Brief History of Illness:      Mr. 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 The most likely source of his fever is right middle and lower lobe pneumonia given CXR findings, with sputum cultures growing stenotrophomonas maltophilia.      Patient was started on vancomycin/zosyn upon admission and was transitioned to bactrim upon sputum cx results. Bactrim was ultimately discontinued in setting of hives reaction. He was started on Levaquin 750mg daily and is to continue for 7-day course through 6/7/20. Fluconazole was additionally started for ppx given neutropenia.     Discharge was delayed in setting of diarrhea. Improved upon date of discharge; however, prescription for PRN imodium sent with patient. C. Diff negative. Nosebleed morning of discharge, lasting 30 minutes, stopping with pressure.     Follow-up:   - 6/5: lab appt @ 2:00PM ? cbc w/ diff, bmp, coags  - 6/5: MEAGAN appt @ 2:25PM ? please determine whether to continue ppx levaquin following 7-day tx course given counts at follow-up.     Medications upon discharge:   - Levaquin: 750mg daily to continue for total of 7-day course through 6/7/20  - Fluconazole: started for prophylaxis in setting of neutropenia  - Oxymetazoline: will send bedside bottle home PRN for nosebleeds  - Imodium: PRN for diarrhea      Hospital Course:      Stew Crooks was  admitted on 5/28/2020.  The following problems were addressed during his hospitalization:    1. Stenotrophomonas pneumonia  Patient presented to ED after fever at home >100F. Upon ED arrival, Tmax of 101. Started on cefepime/vanco, infectious studies sent per below. Ongoing loose cough, otherwise no other localizing infectious sx. Urinary studies negative. BCx NGTD, sputum cultures resulted stenotrophomonas maltophilia. He was initially started on bactrim; however, was transitioned to levaquin 750mg daily given hives reaction to bactrim.   - Continue levaquin for total of 7-day course through 6/7/20    2. Concern for aspiration  SLP evaluation with flash penetration w/ fluids, no aspiration with any consistency. Recommend regular diet/thin liquids w/ pt self-selecting softer foods.     3. Myelodysplastic syndrome, pancytopenia 2/2 chemotherapy and underlying disease  Myelodysplastic syndrome with 5.6% blasts, diagnosed 3/30/20.  IPSS-R 4.5  Intermediate- based on normal cytogenetics, Hgb <7, normal WBC and platelets. 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  No FLT3 ITD, but not enough sample for D835 analysis. Initiated treatment with azacitidine 4/19/20, in attempts to decrease in his need for transfusion and to improve his quality of life.   - Started fluconazole 200mg daily for anti-fungal ppx in setting of neutropenia    4. Diarrhea  Plan was to discharge 5/31; however, this was delayed as patient reported having diarrhea every hour. C. Diff was sent and negative. No episodes of diarrhea overnight 6/1 or during day of discharge.  - Imodium PRN     5. Epistaxis  Nosebleed x 30 minutes on day of discharge stopping with pressure. Plt 32. Fibrinogen 638, INR 1.19, PTT 33.   - Will send bedside bottle home PRN for nosebleeds  - Recheck coags at lab appt      Discharge Instructions and Follow-Up:      Discharge diet: Regular diet as tolerated  Discharge follow-up:     Lab appt: 6/5 @ 2:00PM ? cbc w/ diff, BMP, coags    MEAGAN appt:  6/5 @ 2:25PM      Discharge Disposition:      Discharged to home  Condition at discharge: Stable    Aby Juarez PA-C  Hematology/Oncology      Pending Results   Unresulted Labs Ordered in the Past 30 Days of this Admission     Date and Time Order Name Status Description    5/28/2020 1845 Blood culture Preliminary     5/28/2020 1845 Blood culture Preliminary           Code Status   Full Code    Primary Care Physician   Rani Flores        Physical Exam:      Physical Exam   Temp: 98.6  F (37  C) Temp src: Oral BP: (!) 143/63 Pulse: 72 Heart Rate: 80 Resp: 20 SpO2: 92 % O2 Device: None (Room air)    Vitals:    05/30/20 0730 05/31/20 0732 06/01/20 0859   Weight: 68.9 kg (151 lb 14.4 oz) 69.4 kg (152 lb 14.4 oz) 68.5 kg (151 lb 1.6 oz)     Vital Signs with Ranges  Temp:  [95.8  F (35.4  C)-98.6  F (37  C)] 98.6  F (37  C)  Pulse:  [72-79] 72  Heart Rate:  [72-80] 80  Resp:  [18-22] 20  BP: (121-161)/(36-72) 143/63  SpO2:  [92 %-97 %] 92 %  I/O last 3 completed shifts:  In: 520 [P.O.:520]  Out: -     General: Awake and interactive. No acute distress. .   Skin: Warm, dry, and intact without rashes or lesions.   Head: Normocephalic and atraumatic.  HEENT: PERRLA. Sclera non-icteric. EOM intact. Oral mucosa is pink and moist with no lesions, thrush, or exudates.   Cardiac: Regular rate and rhythm. Normal S1 and S2. No murmurs, rubs, or gallops.   Respiratory: No signs of respiratory distress or accessory muscle use. Diffuse wheezing bilaterally. Crackles R lung at base.   Abd: Soft, symmetric, and non-tender without distension. BS present and normoactive. No masses or organomegaly.   Extremities: No swelling or erythema. Distal pulses palpable.   Neuro: A&Ox3. Venezuelan speaking-normal speech. Memory and thought process preserved. Grossly non-focal.  Psych: Appropriate mood and affect. Good judgment and insight. No visual/auditory hallucinations      Time Spent on this Encounter   I, Aby Juarez PA-C, personally  saw the patient today and spent less than or equal to 30 minutes discharging this patient.          Consultations:   Consultations This Hospital Stay   PHARMACY TO DOSE VANCO  PHARMACY TO DOSE VANCO  VASCULAR ACCESS CARE ADULT IP CONSULT  OCCUPATIONAL THERAPY ADULT IP CONSULT  PHYSICAL THERAPY ADULT IP CONSULT    Discharge Orders      CBC with platelets differential    Last Lab Result: Hemoglobin (g/dL)       Date                     Value                 06/01/2020               7.9 (L)          ----------     Basic metabolic panel     INR     Partial thromboplastin time     Fibrinogen activity     Adult [facility] Follow-up and recommended labs and tests    6/1 Labs at the [facility]  6/2 11:10 AM appointment with Danielle LYNN)    Appointments on [facility] and/or [facility] (with [facility] or [facility] provider or service). Call [phone] if you haven't heard regarding these appointments within 7 days of discharge.     Activity    Your activity upon discharge: activity as tolerated     Follow Up and recommended labs and tests    Lab and follow-up appointment rescheduled as listed below:   6/5: Labs @ 2:00PM  6/5: In-person appt with Danielle Hall @ 2:25PM     Reason for your hospital stay    Pneumonia    Patient will be discharged on the antibiotic Levaquin 750mg daily for pneumonia. He should continue until June 7th, 2020. Please discuss with Danielle Hall at follow-up appt whether or not to continue this at a smaller prophylactic dose once finished on June 7th, 2020. We also started an anti-fungal medication in the hospital to prevent fungal infections since white blood count is low. We will send nasal spray home to use if needed for nosebleeds. We will also send a prescription of Imodium for diarrhea to take if needed.      Pacientul va fi externat pe antibioticul Levaquin 750mg zilnic pentru pneumonie. El ar trebui sa continue pâna pe 7 iunie 2020. Va rugam sa discuta?i cu Danielle Hall la apptul de urmarire, daca sa  continua?i arnie nu aceasta doza profilactica william luis manuel, odata terminata pe 7 iunie 2020. De asemenea, am început un medicament anti-fungice în spital pentru a previn infec?iile fungice, deoarece numarul sangelui alb dian scazut. Vom trimite spray nazal acasa pentru a-l utiliza, daca dian necesar pentru alezele nasului. Vom trimite, de asemenea, o re?eta de Imodium pentru diaree pentru a maya, daca dian nevoie.     Full Code     Diet    Follow this diet upon discharge: Orders Placed This Encounter      Snacks/Supplements Adult: Boost Shake; With Meals      Room Service      Regular Diet Adult       Discharge Medications:      Discharge Medications   Current Discharge Medication List      START taking these medications    Details   fluconazole (DIFLUCAN) 200 MG tablet Take 1 tablet (200 mg) by mouth daily  Qty: 30 tablet, Refills: 1    Associated Diagnoses: Pneumonia of right middle lobe due to infectious organism (H)      !! levofloxacin (LEVAQUIN) 750 MG tablet Take 1 tablet (750 mg) by mouth every 24 hours for 6 days  Qty: 6 tablet, Refills: 0    Associated Diagnoses: Pneumonia of right middle lobe due to infectious organism (H)      loperamide (IMODIUM) 2 MG capsule Take 1 capsule (2 mg) by mouth 4 times daily as needed for diarrhea  Qty: 30 capsule, Refills: 0    Associated Diagnoses: Pneumonia of right middle lobe due to infectious organism (H)      oxymetazoline (AFRIN) 0.05 % nasal spray Spray 2 sprays into both nostrils 2 times daily as needed for congestion or other (epistaxis)  Qty: 1 Bottle, Refills: 0    Comments: No need to fill -- RN to send bedside bottle.  Associated Diagnoses: Pneumonia of right middle lobe due to infectious organism (H); MDS (myelodysplastic syndrome) (H)       !! - Potential duplicate medications found. Please discuss with provider.      CONTINUE these medications which have NOT CHANGED    Details   ALBUTEROL 108 (90 BASE) MCG/ACT inhaler INHALE 2 PUFFS BY MOUTH EVERY 4 HOURS AS  NEEDED  Refills: 3      DULERA 100-5 MCG/ACT oral inhaler INHALE 2 PUFFS BY MOUTH TWICE DAILY  Refills: 3      famotidine (PEPCID) 20 MG tablet       !! levofloxacin (LEVAQUIN) 250 MG tablet Take 1 tablet (250 mg) by mouth daily  Qty: 30 tablet, Refills: 1    Associated Diagnoses: MDS (myelodysplastic syndrome) (H)      LORazepam (ATIVAN) 0.5 MG tablet Take 1 tablet (0.5 mg) by mouth every 4 hours as needed (Anxiety, Nausea/Vomiting or Sleep)  Qty: 30 tablet, Refills: 5    Associated Diagnoses: MDS (myelodysplastic syndrome) (H)      ondansetron (ZOFRAN) 8 MG tablet Take 1 tablet (8 mg) by mouth every 8 hours as needed (Nausea/Vomiting)  Qty: 10 tablet, Refills: 5    Associated Diagnoses: MDS (myelodysplastic syndrome) (H)      prochlorperazine (COMPAZINE) 10 MG tablet Take 0.5 tablets (5 mg) by mouth every 6 hours as needed (Nausea/Vomiting)  Qty: 30 tablet, Refills: 5    Associated Diagnoses: MDS (myelodysplastic syndrome) (H)       !! - Potential duplicate medications found. Please discuss with provider.        Allergies   Allergies   Allergen Reactions     Bactrim [Sulfamethoxazole W/Trimethoprim] Hives and Itching     Required stat IV benadryl     Blood Transfusion Related (Informational Only)      Developed hives and dizziness post blood transfusion on 4/8/20.     Data   Most Recent 3 CBC's:  Recent Labs   Lab Test 06/01/20  0557 05/31/20  0524 05/30/20  0547   WBC 1.3* 1.1* 1.0*   HGB 7.9* 7.7* 7.0*   MCV 91 89 89   PLT 32* 29* 29*      Most Recent 3 BMP's:  Recent Labs   Lab Test 06/01/20  0557 05/31/20  0524 05/30/20  0547    143 141   POTASSIUM 4.5 4.3 4.0   CHLORIDE 110* 113* 112*   CO2 23 22 22   BUN 15 14 10   CR 1.10 1.06 1.09   ANIONGAP 7 8 7   ALFREDO 8.4* 8.1* 8.2*   GLC 89 109* 105*     Most Recent 2 LFT's:  Recent Labs   Lab Test 05/28/20  1849 05/27/20  0747   AST 12 9   ALT 17 20   ALKPHOS 98 99   BILITOTAL 1.1 0.7     Most Recent INR's and Anticoagulation Dosing History:  Anticoagulation Dose  History     Recent Dosing and Labs Latest Ref Rng & Units 6/23/2014 6/24/2014 10/3/2014 10/4/2014 12/4/2015 3/30/2020 6/1/2020    INR 0.86 - 1.14 1.06 1.12 1.07 1.15(H) 1.23(H) 1.17(H) 1.19(H)        Most Recent 3 Troponin's:  Recent Labs   Lab Test 05/28/20  1849 05/25/14  2337   TROPI <0.015  --    TROPONIN  --  0.02     Most Recent Cholesterol Panel:No lab results found.  Most Recent 6 Bacteria Isolates From Any Culture (See EPIC Reports for Culture Details):  Recent Labs   Lab Test 05/29/20  0850 05/28/20  1849 04/24/20  1345 04/08/20  1512 01/08/16  0950 10/03/14  0751   CULT Light growth  Normal ron    Light growth  Stenotrophomonas maltophilia  *  No MRSA isolated No growth after 4 days  No growth after 4 days No growth  No growth No growth No growth after 4 weeks  No growth  Test reordered as miscellaneous test On 1.8.16 No growth     Most Recent TSH, T4 and A1c Labs:  Recent Labs   Lab Test 03/19/20  0820   TSH 3.62

## 2020-06-01 NOTE — PLAN OF CARE
Occupational Therapy Discharge Summary    Reason for therapy discharge:    Discharged to home.    Progress towards therapy goal(s). See goals on Care Plan in [facility] electronic health record for goal details.  Goals partially met.  Barriers to achieving goals:   discharge from facility.    Therapy recommendation(s):    Continue home exercise program.

## 2020-06-01 NOTE — PLAN OF CARE
Discharge  D: Pt afebrile, vital signs stable, Up ad irvin, Reports comfort. Orders for discharge and outpatient medications written.  I: Home medications and return to clinic schedule reviewed with patient. Discharge instructions and parameters for calling Health Care Provider reviewed. Patient left at 1315 accompanied by son.   A: Patient/family verbalized understanding and was ready for discharge.   P: Medications retrieved from discharge Pharmacy and reviewed with pt and son. Follow up as scheduled tomorrow 6/2/20 at [facility] for Labs and appointment with Danielle Hall NP.

## 2020-06-01 NOTE — PROVIDER NOTIFICATION
Provider notified of itchiness/rash returning. VSS on RA. Denies chest tightness/SOB/trouble breathing.     Plan: Continue to monitor and administer IV Benadryl if necessary

## 2020-06-05 NOTE — PROGRESS NOTES
Reason for Visit: f/u MDS, post-hospitalization follow-up    PROBLEM LIST:  1. 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  No FLT3 ITD, but not enough sample for D835 analysis  - History of pancytopenia, December 2015, with negative evaluation except for bone marrow biopsy possibly suspicious for MDS. Normal cytogenetics  - Initiated treatment with azacitidine 4/19/20, in attempts to decrease in his need for transfusion and to improve his quality of life.      2.  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 Received chemoradiation with cisplatin/Etoposide 4/14/14-5/19/14.  6000 cGy in 30 fractions completed 6/9/14 . Difficult course, with hospitalizations for neutropenic fevers esophagitis. No evidence of lung cancer recurrence.    Interval history: I utilized a Mauritanian  and conferenced his son in for the visit as well. Stew was hospitalized with neutropenic fevers on 5/28/20 and found to have RML and RLL pneumonia. Sputum cultures grew stenotrophomonas maltophilia. He was started on vancomycin/zosyn on admission and transitioned to bactrim upon sputus culture results. He developed hives/rash on the bactrim and was transitioned to levofloxacin 750 mg daily. He was afebrile and discharged to home on 6/1/20.    Stew is feeling well today. No fevers/chills since discharging home. Tolerating the levaquin well. Developed a rash with some antibiotics during the hospitalization, but that is improving. No cough, sob, cp, palpitation. No headaches, vision changes. Bowel/bladder function wnl. No bleeding/bruising.    Current Outpatient Medications   Medication Sig Dispense Refill     ALBUTEROL 108 (90 BASE) MCG/ACT inhaler INHALE 2 PUFFS BY MOUTH EVERY 4 HOURS AS NEEDED  3     DULERA 100-5 MCG/ACT oral inhaler INHALE 2 PUFFS BY  "MOUTH TWICE DAILY  3     famotidine (PEPCID) 20 MG tablet        fluconazole (DIFLUCAN) 200 MG tablet Take 1 tablet (200 mg) by mouth daily 30 tablet 1     levofloxacin (LEVAQUIN) 250 MG tablet Take 1 tablet (250 mg) by mouth daily 30 tablet 1     levofloxacin (LEVAQUIN) 750 MG tablet Take 1 tablet (750 mg) by mouth every 24 hours for 6 days 6 tablet 0     loperamide (IMODIUM) 2 MG capsule Take 1 capsule (2 mg) by mouth 4 times daily as needed for diarrhea 30 capsule 0     LORazepam (ATIVAN) 0.5 MG tablet Take 1 tablet (0.5 mg) by mouth every 4 hours as needed (Anxiety, Nausea/Vomiting or Sleep) 30 tablet 5     ondansetron (ZOFRAN) 8 MG tablet Take 1 tablet (8 mg) by mouth every 8 hours as needed (Nausea/Vomiting) 10 tablet 5     oxymetazoline (AFRIN) 0.05 % nasal spray Spray 2 sprays into both nostrils 2 times daily as needed for congestion or other (epistaxis) 1 Bottle 0     prochlorperazine (COMPAZINE) 10 MG tablet Take 0.5 tablets (5 mg) by mouth every 6 hours as needed (Nausea/Vomiting) 30 tablet 5          Allergies   Allergen Reactions     Bactrim [Sulfamethoxazole W/Trimethoprim] Hives and Itching     Required stat IV benadryl     Blood Transfusion Related (Informational Only)      Developed hives and dizziness post blood transfusion on 4/8/20.         Exam: alert, appears in NAD. Blood pressure 130/61, pulse 78, temperature 98.3  F (36.8  C), temperature source Oral, resp. rate 16, height 1.753 m (5' 9\"), weight 69.7 kg (153 lb 9.6 oz), SpO2 97 %.  Wt Readings from Last 4 Encounters:   06/05/20 69.7 kg (153 lb 9.6 oz)   06/01/20 68.5 kg (151 lb 1.6 oz)   05/28/20 71.2 kg (156 lb 14.4 oz)   05/27/20 70.3 kg (155 lb)     Oropharynx is moist and without lesion. Neck supple and without adenopathy. Lungs:CTA. Heart: RRR, no murmur or rub. Abdomen: soft, nontender, BS active, no masses or organomegaly.  Extremities: warm, no edema. Speech is clear. CN wnl. Gait/station wnl. Skin: no rash or bruising on the " exposed skin.       Labs:   Results for ASHELY LOPEZ (MRN [id number]) as of 6/5/2020 16:14   Ref. Range 6/5/2020 14:45   Sodium Latest Ref Range: 133 - 144 mmol/L 140   Potassium Latest Ref Range: 3.4 - 5.3 mmol/L 4.5   Chloride Latest Ref Range: 94 - 109 mmol/L 110 (H)   Carbon Dioxide Latest Ref Range: 20 - 32 mmol/L 24   Urea Nitrogen Latest Ref Range: 7 - 30 mg/dL 27   Creatinine Latest Ref Range: 0.66 - 1.25 mg/dL 1.06   GFR Estimate Latest Ref Range: >60 mL/min/1.73_m2 68   GFR Estimate If Black Latest Ref Range: >60 mL/min/1.73_m2 79   Calcium Latest Ref Range: 8.5 - 10.1 mg/dL 8.8   Anion Gap Latest Ref Range: 3 - 14 mmol/L 6   Glucose Latest Ref Range: 70 - 99 mg/dL 109 (H)   WBC Latest Ref Range: 4.0 - 11.0 10e9/L 1.2 (L)   Hemoglobin Latest Ref Range: 13.3 - 17.7 g/dL 8.4 (L)   Hematocrit Latest Ref Range: 40.0 - 53.0 % 26.4 (L)   Platelet Count Latest Ref Range: 150 - 450 10e9/L 60 (L)   RBC Count Latest Ref Range: 4.4 - 5.9 10e12/L 2.92 (L)   MCV Latest Ref Range: 78 - 100 fl 90   MCH Latest Ref Range: 26.5 - 33.0 pg 28.8   MCHC Latest Ref Range: 31.5 - 36.5 g/dL 31.8   RDW Latest Ref Range: 10.0 - 15.0 % 14.2   Diff Method Unknown Manual Differential   % Neutrophils Latest Units: % 49.0   % Lymphocytes Latest Units: % 39.0   % Monocytes Latest Units: % 2.0   % Eosinophils Latest Units: % 1.0   % Basophils Latest Units: % 1.0   % Myelocytes Latest Units: % 2.0   % Blasts Latest Units: % 6.0   Absolute Neutrophil Latest Ref Range: 1.6 - 8.3 10e9/L 0.6 (L)   Absolute Lymphocytes Latest Ref Range: 0.8 - 5.3 10e9/L 0.5 (L)   Absolute Monocytes Latest Ref Range: 0.0 - 1.3 10e9/L 0.0   Absolute Eosinophils Latest Ref Range: 0.0 - 0.7 10e9/L 0.0   Absolute Basophils Latest Ref Range: 0.0 - 0.2 10e9/L 0.0   Absolute Myelocytes Latest Ref Range: 0 10e9/L 0.0   Absolute Blasts Latest Ref Range: 0 10e9/L 0.1 (H)   Poikilocytosis Unknown Slight   Platelet Estimate Unknown Confirming automated cell count   INR  Latest Ref Range: 0.86 - 1.14  1.09   PTT Latest Ref Range: 22 - 37 sec 32   Fibrinogen Latest Ref Range: 200 - 420 mg/dL 563 (H)       Imaging: n/a    Impression/plan:   MDS with transfusion dependence  - 5.6% blasts, diagnosed 3/30/20.  IPSS-R 4.5  Intermediate- based on normal cytogenetics, Hgb <7, normal WBC and platelets. 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  No FLT3 ITD, but not enough sample for D835 analysis.  - Initiated palliative treatment with azacitidine 4/19/20  -Dr. Pearce delayed cycle 3 to allow for count recovery. Plans to dose reduce to 50% of the original dose, cycle 3 to start on 6/22. Will have a visit with an MEAGAN prior to that visit  -remains transfusion dependent. No transfusion needs today. Anemia/thrombocytopenia improving  -will check labs in 1 week, transfuse 1 U PRBC for hgb <8, platelet <10K    Bacterial pneumonia-sputum culture grew stenotrephomonas  - on levaquin 750 mg daily through 6/7/20. Clinically improving. Reviewed that he should complete the levaquin 750 mg, then decrease back to prophy dosing of 250 mg daily    ID: neutropenic, but afebrile. Clinically improving  -Resume levofloxacin 250 mg daily after completing treatment doses on 6/7/20  -fluconazole 200 mg daily    Concern for aspiration. Had SLP evaluation while hospitalized. Had flash penetration with fluids. No aspiration with any consistently. Regular diet/thin liquids were ok per SLP        Transition Care Management Services  Date of admission: 5/28/20.   Date of discharge: 6/1/20  Interactive contact date: 6/1/2020  Face-to-face visit date: 6/5/2020    Medical complexity decision making: Moderate complexity (93135)

## 2020-06-05 NOTE — NURSING NOTE
Chief Complaint   Patient presents with     Blood Draw     labs drawn via venipuncture by RN in lab     /61 (BP Location: Left arm, Patient Position: Chair, Cuff Size: Adult Regular)   Pulse 78   Temp 98.3  F (36.8  C) (Oral)   Resp 16   Wt 69.7 kg (153 lb 9.6 oz)   SpO2 97%   BMI 22.68 kg/m      Labs collected and sent from right antecubital venipuncture in lab by RN. Pt tolerated well.   Pt checked in for next appointment.    Mary Melendez RN

## 2020-06-05 NOTE — NURSING NOTE
"Oncology Rooming Note    June 5, 2020 2:57 PM   Stew Crooks is a 75 year old male who presents for:    Chief Complaint   Patient presents with     Blood Draw     labs drawn via venipuncture by RN in lab     Oncology Clinic Visit     Return: MDS (myelodysplastic syndrome)     Initial Vitals: /61 (BP Location: Left arm, Patient Position: Chair, Cuff Size: Adult Regular)   Pulse 78   Temp 98.3  F (36.8  C) (Oral)   Resp 16   Ht 1.753 m (5' 9\")   Wt 69.7 kg (153 lb 9.6 oz)   SpO2 97%   BMI 22.68 kg/m   Estimated body mass index is 22.68 kg/m  as calculated from the following:    Height as of this encounter: 1.753 m (5' 9\").    Weight as of this encounter: 69.7 kg (153 lb 9.6 oz). Body surface area is 1.84 meters squared.  No Pain (0) Comment: Data Unavailable   No LMP for male patient.  Allergies reviewed: Yes  Medications reviewed: Yes    Medications: Medication refills not needed today.  Pharmacy name entered into [employer]: [id number] [address] [address]    Clinical concerns: N/A      Gege Dougherty CMA              "

## 2020-06-05 NOTE — LETTER
6/5/2020         RE: Stew Crooks  [address] [facility] [place] [address]        Dear Colleague,    Thank you for referring your patient, Stew Crooks, to the [facility]. Please see a copy of my visit note below.    Reason for Visit: f/u MDS, post-hospitalization follow-up    PROBLEM LIST:  1. 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  No FLT3 ITD, but not enough sample for D835 analysis  - History of pancytopenia, December 2015, with negative evaluation except for bone marrow biopsy possibly suspicious for MDS. Normal cytogenetics  - Initiated treatment with azacitidine 4/19/20, in attempts to decrease in his need for transfusion and to improve his quality of life.      2.  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 Received chemoradiation with cisplatin/Etoposide 4/14/14-5/19/14.  6000 cGy in 30 fractions completed 6/9/14 . Difficult course, with hospitalizations for neutropenic fevers esophagitis. No evidence of lung cancer recurrence.    Interval history: I utilized a Slovenian  and conferenced his son in for the visit as well. Stew was hospitalized with neutropenic fevers on 5/28/20 and found to have RML and RLL pneumonia. Sputum cultures grew stenotrophomonas maltophilia. He was started on vancomycin/zosyn on admission and transitioned to bactrim upon sputus culture results. He developed hives/rash on the bactrim and was transitioned to levofloxacin 750 mg daily. He was afebrile and discharged to home on 6/1/20.    Stew is feeling well today. No fevers/chills since discharging home. Tolerating the levaquin well. Developed a rash with some antibiotics during the hospitalization, but that is improving. No cough, sob, cp, palpitation. No headaches, vision changes. Bowel/bladder function wnl. No  "bleeding/bruising.    Current Outpatient Medications   Medication Sig Dispense Refill     ALBUTEROL 108 (90 BASE) MCG/ACT inhaler INHALE 2 PUFFS BY MOUTH EVERY 4 HOURS AS NEEDED  3     DULERA 100-5 MCG/ACT oral inhaler INHALE 2 PUFFS BY MOUTH TWICE DAILY  3     famotidine (PEPCID) 20 MG tablet        fluconazole (DIFLUCAN) 200 MG tablet Take 1 tablet (200 mg) by mouth daily 30 tablet 1     levofloxacin (LEVAQUIN) 250 MG tablet Take 1 tablet (250 mg) by mouth daily 30 tablet 1     levofloxacin (LEVAQUIN) 750 MG tablet Take 1 tablet (750 mg) by mouth every 24 hours for 6 days 6 tablet 0     loperamide (IMODIUM) 2 MG capsule Take 1 capsule (2 mg) by mouth 4 times daily as needed for diarrhea 30 capsule 0     LORazepam (ATIVAN) 0.5 MG tablet Take 1 tablet (0.5 mg) by mouth every 4 hours as needed (Anxiety, Nausea/Vomiting or Sleep) 30 tablet 5     ondansetron (ZOFRAN) 8 MG tablet Take 1 tablet (8 mg) by mouth every 8 hours as needed (Nausea/Vomiting) 10 tablet 5     oxymetazoline (AFRIN) 0.05 % nasal spray Spray 2 sprays into both nostrils 2 times daily as needed for congestion or other (epistaxis) 1 Bottle 0     prochlorperazine (COMPAZINE) 10 MG tablet Take 0.5 tablets (5 mg) by mouth every 6 hours as needed (Nausea/Vomiting) 30 tablet 5          Allergies   Allergen Reactions     Bactrim [Sulfamethoxazole W/Trimethoprim] Hives and Itching     Required stat IV benadryl     Blood Transfusion Related (Informational Only)      Developed hives and dizziness post blood transfusion on 4/8/20.         Exam: alert, appears in NAD. Blood pressure 130/61, pulse 78, temperature 98.3  F (36.8  C), temperature source Oral, resp. rate 16, height 1.753 m (5' 9\"), weight 69.7 kg (153 lb 9.6 oz), SpO2 97 %.  Wt Readings from Last 4 Encounters:   06/05/20 69.7 kg (153 lb 9.6 oz)   06/01/20 68.5 kg (151 lb 1.6 oz)   05/28/20 71.2 kg (156 lb 14.4 oz)   05/27/20 70.3 kg (155 lb)     Oropharynx is moist and without lesion. Neck supple and " without adenopathy. Lungs:CTA. Heart: RRR, no murmur or rub. Abdomen: soft, nontender, BS active, no masses or organomegaly.  Extremities: warm, no edema. Speech is clear. CN wnl. Gait/station wnl. Skin: no rash or bruising on the exposed skin.       Labs:   Results for ASHELY LOPEZ (MRN [id number]) as of 6/5/2020 16:14   Ref. Range 6/5/2020 14:45   Sodium Latest Ref Range: 133 - 144 mmol/L 140   Potassium Latest Ref Range: 3.4 - 5.3 mmol/L 4.5   Chloride Latest Ref Range: 94 - 109 mmol/L 110 (H)   Carbon Dioxide Latest Ref Range: 20 - 32 mmol/L 24   Urea Nitrogen Latest Ref Range: 7 - 30 mg/dL 27   Creatinine Latest Ref Range: 0.66 - 1.25 mg/dL 1.06   GFR Estimate Latest Ref Range: >60 mL/min/1.73_m2 68   GFR Estimate If Black Latest Ref Range: >60 mL/min/1.73_m2 79   Calcium Latest Ref Range: 8.5 - 10.1 mg/dL 8.8   Anion Gap Latest Ref Range: 3 - 14 mmol/L 6   Glucose Latest Ref Range: 70 - 99 mg/dL 109 (H)   WBC Latest Ref Range: 4.0 - 11.0 10e9/L 1.2 (L)   Hemoglobin Latest Ref Range: 13.3 - 17.7 g/dL 8.4 (L)   Hematocrit Latest Ref Range: 40.0 - 53.0 % 26.4 (L)   Platelet Count Latest Ref Range: 150 - 450 10e9/L 60 (L)   RBC Count Latest Ref Range: 4.4 - 5.9 10e12/L 2.92 (L)   MCV Latest Ref Range: 78 - 100 fl 90   MCH Latest Ref Range: 26.5 - 33.0 pg 28.8   MCHC Latest Ref Range: 31.5 - 36.5 g/dL 31.8   RDW Latest Ref Range: 10.0 - 15.0 % 14.2   Diff Method Unknown Manual Differential   % Neutrophils Latest Units: % 49.0   % Lymphocytes Latest Units: % 39.0   % Monocytes Latest Units: % 2.0   % Eosinophils Latest Units: % 1.0   % Basophils Latest Units: % 1.0   % Myelocytes Latest Units: % 2.0   % Blasts Latest Units: % 6.0   Absolute Neutrophil Latest Ref Range: 1.6 - 8.3 10e9/L 0.6 (L)   Absolute Lymphocytes Latest Ref Range: 0.8 - 5.3 10e9/L 0.5 (L)   Absolute Monocytes Latest Ref Range: 0.0 - 1.3 10e9/L 0.0   Absolute Eosinophils Latest Ref Range: 0.0 - 0.7 10e9/L 0.0   Absolute Basophils Latest Ref Range:  0.0 - 0.2 10e9/L 0.0   Absolute Myelocytes Latest Ref Range: 0 10e9/L 0.0   Absolute Blasts Latest Ref Range: 0 10e9/L 0.1 (H)   Poikilocytosis Unknown Slight   Platelet Estimate Unknown Confirming automated cell count   INR Latest Ref Range: 0.86 - 1.14  1.09   PTT Latest Ref Range: 22 - 37 sec 32   Fibrinogen Latest Ref Range: 200 - 420 mg/dL 563 (H)       Imaging: n/a    Impression/plan:   MDS with transfusion dependence  - 5.6% blasts, diagnosed 3/30/20.  IPSS-R 4.5  Intermediate- based on normal cytogenetics, Hgb <7, normal WBC and platelets. 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  No FLT3 ITD, but not enough sample for D835 analysis.  - Initiated palliative treatment with azacitidine 4/19/20  -Dr. Pearce delayed cycle 3 to allow for count recovery. Plans to dose reduce to 50% of the original dose, cycle 3 to start on 6/22. Will have a visit with an MEAGAN prior to that visit  -remains transfusion dependent. No transfusion needs today. Anemia/thrombocytopenia improving  -will check labs in 1 week, transfuse 1 U PRBC for hgb <8, platelet <10K    Bacterial pneumonia-sputum culture grew stenotrephomonas  - on levaquin 750 mg daily through 6/7/20. Clinically improving. Reviewed that he should complete the levaquin 750 mg, then decrease back to prophy dosing of 250 mg daily    ID: neutropenic, but afebrile. Clinically improving  -Resume levofloxacin 250 mg daily after completing treatment doses on 6/7/20  -fluconazole 200 mg daily    Concern for aspiration. Had SLP evaluation while hospitalized. Had flash penetration with fluids. No aspiration with any consistently. Regular diet/thin liquids were ok per SLP        Transition Care Management Services  Date of admission: 5/28/20.   Date of discharge: 6/1/20  Interactive contact date: 6/1/2020  Face-to-face visit date: 6/5/2020    Medical complexity decision making: Moderate complexity (28351)    Again, thank you for allowing me to participate in the care of your patient.         Sincerely,        HAYLIE Winter CNP

## 2020-06-12 NOTE — PROGRESS NOTES
Infusion Nursing Note:  Stew Crooks presents today for 1 unit of PRBCs.    Patient seen by provider today: No   present during visit today: Yes, Language: Swazi, asked to use son as  over the telephone.     Note: Reports feeling well today. Denies any shortness of breath, dizziness, cough, chest pain, or fatigue. Had a little bit of constipation, but took Dulcolax 2 days ago and was able to have a bowel movement.  Has a bruise on his right hand from a previous IV site. No other issues or concerns today.     Patient premedicated to Tylenol and Benadryl prior to PRBCs    Intravenous Access:  Peripheral IV placed by vascular access    Treatment Conditions:  Lab Results   Component Value Date    HGB 7.6 06/11/2020     Lab Results   Component Value Date    WBC 0.9 06/11/2020      Lab Results   Component Value Date    ANEU 0.2 06/11/2020     Lab Results   Component Value Date    PLT 65 06/11/2020      Results reviewed, labs MET treatment parameters, ok to proceed with treatment.  Blood transfusion consent signed 3/30/20.      Post Infusion Assessment:  Patient tolerated infusion without incident.  Blood return noted pre and post infusion.  Site patent and intact, free from redness, edema or discomfort.  No evidence of extravasations.  Access discontinued per protocol.       Discharge Plan:   Patient declined prescription refills.  Discharge instructions reviewed with: Patient.  Patient and/or family verbalized understanding of discharge instructions and all questions answered.  Copy of AVS reviewed with patient and/or family.  Patient will return 6/22/20 for next appointment.  Patient discharged in stable condition accompanied by: self.  Departure Mode: Ambulatory.    Deann Gutierrez RN

## 2020-06-12 NOTE — PATIENT INSTRUCTIONS
Contact Numbers  [facility]: [phone]        Please call the [facility] Triage line if you experience a temperature greater than or equal to 100.5, shaking chills, have uncontrolled nausea, vomiting and/or diarrhea, dizziness, shortness of breath, chest pain, bleeding, unexplained bruising, or if you have any other new/concerning symptoms, questions or concerns.     If it is after hours, weekends, or holidays, please call the main hospital  at  [phone] and ask to speak to the Oncology doctor on call.     If you are having any concerning symptoms or wish to speak to a provider before your next infusion visit, please call your care coordinator or triage to notify them so we can adequately serve you.     If you need a refill on a narcotic prescription or other medication, please call triage before your infusion appointment.

## 2020-06-19 NOTE — LETTER
"    6/19/2020         RE: Stew Crooks  [address] [facility] [address]      Stew Crooks is a 75 year old male who is being evaluated via a billable telephone visit.      The patient has been notified of following:     \"This telephone visit will be conducted via a call between you and your physician/provider. We have found that certain health care needs can be provided without the need for a physical exam.  This service lets us provide the care you need with a short phone conversation.  If a prescription is necessary we can send it directly to your pharmacy.  If lab work is needed we can place an order for that and you can then stop by our lab to have the test done at a later time.    Telephone visits are billed at different rates depending on your insurance coverage. During this emergency period, for some insurers they may be billed the same as an in-person visit.  Please reach out to your insurance provider with any questions.    If during the course of the call the physician/provider feels a telephone visit is not appropriate, you will not be charged for this service.\"    Patient has given verbal consent for Telephone visit?  Yes    What phone number would you like to be contacted at? [phone]    How would you like to obtain your AVS? Joseph    I have reviewed and updated the patient's allergies and medication list.     Concerns: No  Refills: No    Samantha Britt LPN    Oncology/Hematology Visit Note  Jun 19, 2020    Reason for Visit: f/u MDS    PROBLEM LIST:  1. 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  No FLT3 ITD, but not enough sample for D835 analysis  - History of pancytopenia, December 2015, with negative evaluation except for bone marrow biopsy possibly suspicious for MDS. Normal cytogenetics  - Initiated treatment with azacitidine 4/19/20, in attempts " to decrease in his need for transfusion and to improve his quality of life.      2.  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 Received chemoradiation with cisplatin/Etoposide 4/14/14-5/19/14.  6000 cGy in 30 fractions completed 6/9/14 . Difficult course, with hospitalizations for neutropenic fevers esophagitis. No evidence of lung cancer recurrence.    Interval history: I utilized a Macedonian .  -Has been feeling fine.  -Feels better since pneumonia.  -Has rare nausea, managed with antiemetics prn.  -Has constipation, managed with Dulcolax.  -Naps for about 1-1.5 hours per day.  -Has some blood mixed with nasal mucus in the mornings with no other bleeding issues.     Review of Systems:  Patient denies any of the following except if noted above: fevers, chills, difficulty with energy, vision or hearing changes, chest pain, dyspnea, abdominal pain, vomiting, diarrhea, urinary concerns, headaches, numbness, tingling, issues with sleep or mood.     Current Outpatient Medications   Medication Sig Dispense Refill     ALBUTEROL 108 (90 BASE) MCG/ACT inhaler INHALE 2 PUFFS BY MOUTH EVERY 4 HOURS AS NEEDED  3     DULERA 100-5 MCG/ACT oral inhaler INHALE 2 PUFFS BY MOUTH TWICE DAILY  3     famotidine (PEPCID) 20 MG tablet        fluconazole (DIFLUCAN) 200 MG tablet Take 1 tablet (200 mg) by mouth daily 30 tablet 1     levofloxacin (LEVAQUIN) 250 MG tablet Take 1 tablet (250 mg) by mouth daily 30 tablet 1     loperamide (IMODIUM) 2 MG capsule Take 1 capsule (2 mg) by mouth 4 times daily as needed for diarrhea 30 capsule 0     LORazepam (ATIVAN) 0.5 MG tablet Take 1 tablet (0.5 mg) by mouth every 4 hours as needed (Anxiety, Nausea/Vomiting or Sleep) 30 tablet 5     ondansetron (ZOFRAN) 8 MG tablet Take 1 tablet (8 mg) by mouth every 8 hours as needed (Nausea/Vomiting) 10 tablet 5     oxymetazoline (AFRIN) 0.05 % nasal spray Spray 2 sprays into both nostrils 2 times daily  "as needed for congestion or other (epistaxis) 1 Bottle 0     prochlorperazine (COMPAZINE) 10 MG tablet Take 0.5 tablets (5 mg) by mouth every 6 hours as needed (Nausea/Vomiting) 30 tablet 5          Allergies   Allergen Reactions     Bactrim [Sulfamethoxazole W/Trimethoprim] Hives and Itching     Required stat IV benadryl     Blood Transfusion Related (Informational Only)      Developed hives and dizziness post blood transfusion on 4/8/20.       Objective:  There were no vitals taken for this visit.  General: alert and no distress  Psych: Alert and oriented times; coherent speech, normal rate and volume, able to articulate logical thoughts, able to abstract reason, no tangential thoughts, no hallucinations or delusions  Patient's affect is appropriate.   Pulm: Speaking in full sentences, unlabored, no audible wheezes or cough.  The rest of a comprehensive physical examination is deferred due to PHE (public health emergency) video restrictions\"    Labs:    6/11/2020 13:45   WBC 0.9 (LL)   Hemoglobin 7.6 (L)   Hematocrit 23.3 (L)   Platelet Count 65 (L)   RBC Count 2.64 (L)   MCV 88   MCH 28.8   MCHC 32.6   RDW 14.1   Diff Method Manual Differential   % Neutrophils 26.9   % Lymphocytes 59.6   % Monocytes 2.1   % Eosinophils 0.5   % Basophils 0.5   % Myelocytes 2.6   % Blasts 7.8   Absolute Neutrophil 0.2 (LL)   Absolute Lymphocytes 0.5 (L)   Absolute Monocytes 0.0   Absolute Eosinophils 0.0   Absolute Basophils 0.0   Absolute Myelocytes 0.0   Absolute Blasts 0.1 (H)   Poikilocytosis Slight   Platelet Estimate Confirming automated cell count   ABO O   RH(D) Pos   Antibody Screen Neg   Test Valid Only At [facility]   Specimen Expires 06/14/2020   Blood Component Type Red Blood Cells Leukocyte Reduced   Unit Number [id number]   Division Number [id number]   Status of Unit Released to care unit   Crossmatch Red Blood Cells   Unit Status ISS       Impression/plan:   MDS with " transfusion dependence  - 5.6% blasts, diagnosed 3/30/20.  IPSS-R 4.5  Intermediate- based on normal cytogenetics, Hgb <7, normal WBC and platelets. 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  No FLT3 ITD, but not enough sample for D835 analysis.  - Initiated palliative treatment with azacitidine 4/19/20  -Dr. Pearce delayed cycle 3 to allow for count recovery. Plan to dose reduce to 50% of the original dose, cycle 3 to start on 6/22. He is doing well today. Plan to proceed will depend on if he has obtained sufficient count recovery.   -Remains transfusion dependent. Transfuse 1 U PRBC for hgb <8, platelet <10K  -I will check with Dr. Pearce regarding the timing of a bone marrow biopsy for him.    ID: neutropenic at last check, but afebrile.   -Continue levofloxacin 250 mg daily and fluconazole 200 mg daily    Nausea.   -Rare and managed with prn antiemetics.     Roma Wilde PA-C  [facility]  [address] [place] [address]  [phone]    Phone call duration: 15 minutes    Addendum: Discussed with Dr. Pearce and will plan to repeat a bone marrow biopsy after cycle 4.             Roma Wilde PA-C

## 2020-06-19 NOTE — PROGRESS NOTES
"Stew Crooks is a 75 year old male who is being evaluated via a billable telephone visit.      The patient has been notified of following:     \"This telephone visit will be conducted via a call between you and your physician/provider. We have found that certain health care needs can be provided without the need for a physical exam.  This service lets us provide the care you need with a short phone conversation.  If a prescription is necessary we can send it directly to your pharmacy.  If lab work is needed we can place an order for that and you can then stop by our lab to have the test done at a later time.    Telephone visits are billed at different rates depending on your insurance coverage. During this emergency period, for some insurers they may be billed the same as an in-person visit.  Please reach out to your insurance provider with any questions.    If during the course of the call the physician/provider feels a telephone visit is not appropriate, you will not be charged for this service.\"    Patient has given verbal consent for Telephone visit?  Yes    What phone number would you like to be contacted at? [phone]    How would you like to obtain your AVS? MyChart    I have reviewed and updated the patient's allergies and medication list.     Concerns: No  Refills: No    Samantha Britt LPN    Oncology/Hematology Visit Note  Jun 19, 2020    Reason for Visit: f/u MDS    PROBLEM LIST:  1. 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  No FLT3 ITD, but not enough sample for D835 analysis  - History of pancytopenia, December 2015, with negative evaluation except for bone marrow biopsy possibly suspicious for MDS. Normal cytogenetics  - Initiated treatment with azacitidine 4/19/20, in attempts to decrease in his need for transfusion and to improve his quality of life.      2. 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 Received chemoradiation with cisplatin/Etoposide 4/14/14-5/19/14.  6000 cGy in 30 fractions completed 6/9/14 . Difficult course, with hospitalizations for neutropenic fevers esophagitis. No evidence of lung cancer recurrence.    Interval history: I utilized a Barb[place] .  -Has been feeling fine.  -Feels better since pneumonia.  -Has rare nausea, managed with antiemetics prn.  -Has constipation, managed with Dulcolax.  -Naps for about 1-1.5 hours per day.  -Has some blood mixed with nasal mucus in the mornings with no other bleeding issues.     Review of Systems:  Patient denies any of the following except if noted above: fevers, chills, difficulty with energy, vision or hearing changes, chest pain, dyspnea, abdominal pain, vomiting, diarrhea, urinary concerns, headaches, numbness, tingling, issues with sleep or mood.     Current Outpatient Medications   Medication Sig Dispense Refill     ALBUTEROL 108 (90 BASE) MCG/ACT inhaler INHALE 2 PUFFS BY MOUTH EVERY 4 HOURS AS NEEDED  3     DULERA 100-5 MCG/ACT oral inhaler INHALE 2 PUFFS BY MOUTH TWICE DAILY  3     famotidine (PEPCID) 20 MG tablet        fluconazole (DIFLUCAN) 200 MG tablet Take 1 tablet (200 mg) by mouth daily 30 tablet 1     levofloxacin (LEVAQUIN) 250 MG tablet Take 1 tablet (250 mg) by mouth daily 30 tablet 1     loperamide (IMODIUM) 2 MG capsule Take 1 capsule (2 mg) by mouth 4 times daily as needed for diarrhea 30 capsule 0     LORazepam (ATIVAN) 0.5 MG tablet Take 1 tablet (0.5 mg) by mouth every 4 hours as needed (Anxiety, Nausea/Vomiting or Sleep) 30 tablet 5     ondansetron (ZOFRAN) 8 MG tablet Take 1 tablet (8 mg) by mouth every 8 hours as needed (Nausea/Vomiting) 10 tablet 5     oxymetazoline (AFRIN) 0.05 % nasal spray Spray 2 sprays into both nostrils 2 times daily as needed for congestion or other (epistaxis) 1 Bottle 0     prochlorperazine  "(COMPAZINE) 10 MG tablet Take 0.5 tablets (5 mg) by mouth every 6 hours as needed (Nausea/Vomiting) 30 tablet 5          Allergies   Allergen Reactions     Bactrim [Sulfamethoxazole W/Trimethoprim] Hives and Itching     Required stat IV benadryl     Blood Transfusion Related (Informational Only)      Developed hives and dizziness post blood transfusion on 4/8/20.       Objective:  There were no vitals taken for this visit.  General: alert and no distress  Psych: Alert and oriented times; coherent speech, normal rate and volume, able to articulate logical thoughts, able to abstract reason, no tangential thoughts, no hallucinations or delusions  Patient's affect is appropriate.   Pulm: Speaking in full sentences, unlabored, no audible wheezes or cough.  The rest of a comprehensive physical examination is deferred due to PHE (public health emergency) video restrictions\"    Labs:    6/11/2020 13:45   WBC 0.9 (LL)   Hemoglobin 7.6 (L)   Hematocrit 23.3 (L)   Platelet Count 65 (L)   RBC Count 2.64 (L)   MCV 88   MCH 28.8   MCHC 32.6   RDW 14.1   Diff Method Manual Differential   % Neutrophils 26.9   % Lymphocytes 59.6   % Monocytes 2.1   % Eosinophils 0.5   % Basophils 0.5   % Myelocytes 2.6   % Blasts 7.8   Absolute Neutrophil 0.2 (LL)   Absolute Lymphocytes 0.5 (L)   Absolute Monocytes 0.0   Absolute Eosinophils 0.0   Absolute Basophils 0.0   Absolute Myelocytes 0.0   Absolute Blasts 0.1 (H)   Poikilocytosis Slight   Platelet Estimate Confirming automated cell count   ABO O   RH(D) Pos   Antibody Screen Neg   Test Valid Only At [facility]   Specimen Expires 06/14/2020   Blood Component Type Red Blood Cells Leukocyte Reduced   Unit Number [id number]   Division Number 00   Status of Unit Released to care unit   Crossmatch Red Blood Cells   Unit Status ISS       Impression/plan:   MDS with transfusion dependence  - 5.6% blasts, diagnosed 3/30/20.  IPSS-R 4.5  Intermediate- " based on normal cytogenetics, Hgb <7, normal WBC and platelets. 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  No FLT3 ITD, but not enough sample for D835 analysis.  - Initiated palliative treatment with azacitidine 4/19/20  -Dr. Pearce delayed cycle 3 to allow for count recovery. Plan to dose reduce to 50% of the original dose, cycle 3 to start on 6/22. He is doing well today. Plan to proceed will depend on if he has obtained sufficient count recovery.   -Remains transfusion dependent. Transfuse 1 U PRBC for hgb <8, platelet <10K  -I will check with Dr. Pearce regarding the timing of a bone marrow biopsy for him.    ID: neutropenic at last check, but afebrile.   -Continue levofloxacin 250 mg daily and fluconazole 200 mg daily    Nausea.   -Rare and managed with prn antiemetics.     Roma Wilde PA-C  [facility]  [address] [place] [address]  [phone]    Phone call duration: 15 minutes    Addendum: Discussed with Dr. Pearce and will plan to repeat a bone marrow biopsy after cycle 4.

## 2020-06-22 NOTE — PROGRESS NOTES
Infusion Nursing Note:  Stew Crooks presents today for C3D1 Vidaza (held).    Patient seen by provider today: No   present during visit today: Yes, Language: Japanese (pt requested to use his daughter in law).     Note: Patient reported to clinic today with no new complaints or concerns. Patient did not meet parameters for treatment today and his HGB was 7.6.    TORB; 1530 6/22/2020 Roma Wilde PA-C/Abebe Sen RN  -hold chemo today/this week  -reschedule for next week  -Ok to transfuse Wednesday due to pt having no s/s of low hgb  -review neutropenic precautions  -review low hgb s/s    Reviewed neutropenic precautions with patient through daughter in law over the phone, and reviewed low hgb s/s as well. Patient verbally stated understanding through daughter in law.    Intravenous Access:  Labs drawn without difficulty by lab.    Treatment Conditions:  Lab Results   Component Value Date    HGB 7.6 06/22/2020     Lab Results   Component Value Date    WBC 1.1 06/22/2020      Lab Results   Component Value Date    ANEU 0.3 06/22/2020     Lab Results   Component Value Date    PLT 61 06/22/2020      Lab Results   Component Value Date     06/22/2020                   Lab Results   Component Value Date    POTASSIUM 4.5 06/22/2020           Lab Results   Component Value Date    MAG 1.9 06/01/2020            Lab Results   Component Value Date    CR 0.98 06/22/2020                   Lab Results   Component Value Date    ALFREDO 8.5 06/22/2020                Lab Results   Component Value Date    BILITOTAL 0.5 06/22/2020           Lab Results   Component Value Date    ALBUMIN 3.2 06/22/2020                    Lab Results   Component Value Date    ALT 18 06/22/2020           Lab Results   Component Value Date    AST 6 06/22/2020       Results reviewed, labs did NOT meet treatment parameters: See TORB above.      Post Infusion Assessment:  NA.       Discharge Plan:   Patient declined prescription  refills.  Discharge instructions reviewed with: Patient.  Patient and/or family verbalized understanding of discharge instructions and all questions answered.  Copy of AVS reviewed with patient and/or family.  Patient will return 6/24/2020 for next appointment.  Patient discharged in stable condition accompanied by: self.  Departure Mode: Ambulatory.  Face to Face time: 20 minutes.    Abebe Sen RN

## 2020-06-22 NOTE — PROGRESS NOTES
Chief Complaint   Patient presents with     Blood Draw     Blood drawn from  in right hand. Vitals taken. Patient checked into next appointment.       Blood drawn from  in right hand.  Vitals taken. Patient checked into next appointment.    Cris Sawyer MA

## 2020-06-22 NOTE — PATIENT INSTRUCTIONS
[facility] Main Line: [phone]    Call triage nurse with chills and/or temperature greater than or equal to 100.4, uncontrolled nausea/vomiting, diarrhea, constipation, dizziness, shortness of breath, chest pain, bleeding, unexplained bruising, or any new/concerning symptoms, questions/concerns.   If you are having any concerning symptoms or wish to speak to a provider before your next infusion visit, please call your care coordinator or triage to notify them so we can adequately serve you.   Nurse Triage line:  [phone]    If after hours, weekends, or holidays, call main hospital  and ask for Oncology doctor on call @ [phone]      June 2020 Sunday Monday Tuesday Wednesday Thursday Friday Saturday        1     2     3     4     5    [facility]   2:00 PM   (15 min.)   [facility]   [facility] Draw    [facility] RETURN   2:25 PM   (50 min.)   Danielle Hall, HAYLIE CNP   [facility] 6       7     8     9     10     11    [facility]   1:30 PM   (15 min.)   [facility]   [facility] Draw 12    [facility] ONC INFUSION 180   9:00 AM   (180 min.)    ONCOLOGY INFUSION   [facility] 13       14     15     16     17     18     19    TELEPHONE VISIT RETURN   2:10 PM   (60 min.)   Roma Wilde PA-C   [facility] 20       21     22    [facility]   2:00 PM   (15 min.)   [facility]   [facility] Draw    [facility] ONC INFUSION 60   2:30 PM   (60 min.)    ONCOLOGY INFUSION   [facility] 23     24    [facility]RAW  12:00 PM   (15 min.)   [facility]   [facility] Draw    [facility] ON[facility] INFUSION 180  12:30 PM   (180 min.)    ONCOLOGY INFUSION   [facility] 25     26     27       28     29    [facility]  12:30 PM   (15 min.)   [facility]   [facility] Lab Draw    [facility] ONC INFUSION 60    1:00 PM   (60 min.)   [facility] ONCOLOGY INFUSION   [facility] 30    [facility] ONC INFUSION 60   2:00 PM   (60 min.)   [facility] ONCOL[facility] INFUSION   [facility]                                 July 2020 Sunday Monday Tuesday Wednesday Thursday Friday Saturday                  1    [facility] ONC INFUSION 60   2:30 PM   (60 min.)   [facility] ONCOL[facility] INFUSION   [facility] 2    [facility] ONC INFUSION 60   3:00 PM   (60 min.)   [facility] ONCOL[facility]Y INFUSION   [facility] 3    [facility] ONC INFUSION 60   2:30 PM   (60 min.)   [facility] ONCOL[facility]Y INFUSION   [facility] 4       5     6     7     8     9     10     11       12     13     14     15     16     17     18       19     20     21     22     23     24     25       26     27     28     29     30     31                          Lab Results:  Recent Results (from the past 12 hour(s))   CBC with platelets differential    Collection Time: 06/22/20  2:32 PM   Result Value Ref Range    WBC 1.1 (L) 4.0 - 11.0 10e9/L    RBC Count 2.64 (L) 4.4 - 5.9 10e12/L    Hemoglobin 7.6 (L) 13.3 - 17.7 g/dL    Hematocrit 23.1 (L) 40.0 - 53.0 %    MCV 88 78 - 100 fl    MCH 28.8 26.5 - 33.0 pg    MCHC 32.9 31.5 - 36.5 g/dL    RDW 13.9 10.0 - 15.0 %    Platelet Count 61 (L) 150 - 450 10e9/L    Diff Method Manual Differential     % Neutrophils 27.6 %    % Lymphocytes 49.6 %    % Monocytes 3.4 %    % Eosinophils 0.0 %    % Basophils 0.0 %    % Metamyelocytes 1.1 %    % Myelocytes 3.4 %    % Blasts 14.9 %    Nucleated RBCs 1 (H) 0 /100    Absolute Neutrophil 0.3 (LL) 1.6 - 8.3 10e9/L    Absolute Lymphocytes 0.5 (L) 0.8 - 5.3 10e9/L    Absolute Monocytes 0.0 0.0 - 1.3 10e9/L    Absolute Eosinophils 0.0 0.0 - 0.7 10e9/L    Absolute Basophils 0.0 0.0 - 0.2 10e9/L    Absolute Metamyelocytes 0.0 0 10e9/L    Absolute Myelocytes 0.0 0 10e9/L    Absolute Blasts 0.2 (H) 0 10e9/L    Absolute Nucleated RBC 0.0     Poikilocytosis Slight     Ovalocytes Slight      Platelet Estimate Confirming automated cell count    Comprehensive metabolic panel    Collection Time: 06/22/20  2:32 PM   Result Value Ref Range    Sodium 139 133 - 144 mmol/L    Potassium 4.5 3.4 - 5.3 mmol/L    Chloride 108 94 - 109 mmol/L    Carbon Dioxide 26 20 - 32 mmol/L    Anion Gap 5 3 - 14 mmol/L    Glucose 114 (H) 70 - 99 mg/dL    Urea Nitrogen 22 7 - 30 mg/dL    Creatinine 0.98 0.66 - 1.25 mg/dL    GFR Estimate 75 >60 mL/min/[1.73_m2]    GFR Estimate If Black 87 >60 mL/min/[1.73_m2]    Calcium 8.5 8.5 - 10.1 mg/dL    Bilirubin Total 0.5 0.2 - 1.3 mg/dL    Albumin 3.2 (L) 3.4 - 5.0 g/dL    Protein Total 7.2 6.8 - 8.8 g/dL    Alkaline Phosphatase 76 40 - 150 U/L    ALT 18 0 - 70 U/L    AST 6 0 - 45 U/L   Ferritin    Collection Time: 06/22/20  2:33 PM   Result Value Ref Range    Ferritin 1,254 (H) 26 - 388 ng/mL

## 2020-06-24 NOTE — PATIENT INSTRUCTIONS
[facility] Triage and after hours / weekends / holidays:  [phone]    Please call the triage or after hours line if you experience a temperature greater than or equal to 100.5, shaking chills, have uncontrolled nausea, vomiting and/or diarrhea, dizziness, shortness of breath, chest pain, bleeding, unexplained bruising, or if you have any other new/concerning symptoms, questions or concerns.      If you are having any concerning symptoms or wish to speak to a provider before your next infusion visit, please call your care coordinator or triage to notify them so we can adequately serve you.     If you need a refill on a narcotic prescription or other medication, please call before your infusion appointment.       Post Blood Products Discharge Instructions    You have just received a blood product.  During the next 48 hours, please be aware of the following symptoms of a blood product reaction.  If you should experience any of these symptoms call your physician.    -Temperature 100.0 or greater  -Shaking or chills  -Shortness of breath or wheezing  -Headache  -Persistent non-productive cough  -Hives  -Itching  -Extreme weakness  -Facial swelling or flushing  -Red urine    [facility] Triage and after hours / weekends / holidays:  [phone]       June 2020 Sunday Monday Tuesday Wednesday Thursday Friday Saturday        1     2     3     4     5    [facility] DRAW   2:00 PM   (15 min.)    [facility] LAB DRAW   [facility] Lab Draw    [facility] RETURN   2:25 PM   (50 min.)   Danielle Hall, HAYLIE CNP   [facility] 6       7     8     9     10     11    [facility] DRAW   1:30 PM   (15 min.)    [facility] LAB DRAW   [facility] Lab Draw 12    [facility] ONC INFUSION 180   9:00 AM   (180 min.)    ONCOLOGY INFUSION   [facility] 13       14     15     16     17     18     19    TELEPHONE VISIT RETURN   2:10 PM   (60 min.)   Roma Wilde PA-C   [facility]  [facility] 20       21     22    [facility]   2:00 PM   (15 min.)    [facility] DRAW   [facility] Lab Draw    [facility] ONC INFUSION 60   2:30 PM   (60 min.)   [facility] ONCOL[facility] INFUSION   [facility] 23     24    [facility] DRAW  12:00 PM   (15 min.)    [facility] DRAW   [facility] Lab Draw    [facility] ONC INFUSION 180  12:30 PM   (180 min.)   [facility] ONCOLOGY INFUSION   [facility] 25     26     27       28     29    [facility] DRAW  12:30 PM   (15 min.)    [facility] DRAW   [facility] Lab Draw    [facility] ONC INFUSION 60   1:00 PM   (60 min.)    ONCOLOGY INFUSION   [facility] 30    [facility] ONC INFUSION 60   2:00 PM   (60 min.)   [facility] ONCOL[facility]Y INFUSION   [facility]                                 July 2020 Sunday Monday Tuesday Wednesday Thursday Friday Saturday                  1    [facility] ONC INFUSION 60   2:30 PM   (60 min.)   [facility] ONCOLOGY INFUSION   [facility] 2    [facility] ONC INFUSION 60   3:00 PM   (60 min.)    ONCOLOGY INFUSION   [facility] 3    [facility] ONC INFUSION 60   2:30 PM   (60 min.)   [facility] ONCOLOGY INFUSION   [facility] 4       5     6     7     8     9     10     11       12     13     14     15     16     17     18       19     20     21     22     23     24     25       26     27     28     29     30     31                          Lab Results:  Recent Results (from the past 12 hour(s))   Comprehensive metabolic panel    Collection Time: 06/24/20 12:20 PM   Result Value Ref Range    Sodium 139 133 - 144 mmol/L    Potassium 4.3 3.4 - 5.3 mmol/L    Chloride 109 94 - 109 mmol/L    Carbon Dioxide 24 20 - 32 mmol/L    Anion Gap 6 3 - 14 mmol/L    Glucose 110 (H) 70 - 99 mg/dL    Urea Nitrogen 24 7 - 30 mg/dL    Creatinine 1.00 0.66 - 1.25 mg/dL    GFR Estimate 74 >60 mL/min/[1.73_m2]    GFR Estimate If Black 85 >60 mL/min/[1.73_m2]    Calcium 8.5  8.5 - 10.1 mg/dL    Bilirubin Total 0.6 0.2 - 1.3 mg/dL    Albumin 3.1 (L) 3.4 - 5.0 g/dL    Protein Total 7.0 6.8 - 8.8 g/dL    Alkaline Phosphatase 78 40 - 150 U/L    ALT 19 0 - 70 U/L    AST 9 0 - 45 U/L   CBC with platelets differential    Collection Time: 06/24/20 12:20 PM   Result Value Ref Range    WBC 1.2 (L) 4.0 - 11.0 10e9/L    RBC Count 2.56 (L) 4.4 - 5.9 10e12/L    Hemoglobin 7.4 (L) 13.3 - 17.7 g/dL    Hematocrit 22.2 (L) 40.0 - 53.0 %    MCV 87 78 - 100 fl    MCH 28.9 26.5 - 33.0 pg    MCHC 33.3 31.5 - 36.5 g/dL    RDW 13.8 10.0 - 15.0 %    Platelet Count 64 (L) 150 - 450 10e9/L    Diff Method Manual Differential     % Neutrophils 33.0 %    % Lymphocytes 47.0 %    % Monocytes 14.0 %    % Eosinophils 0.0 %    % Basophils 0.0 %    % Blasts 6.0 %    Absolute Neutrophil 0.4 (LL) 1.6 - 8.3 10e9/L    Absolute Lymphocytes 0.6 (L) 0.8 - 5.3 10e9/L    Absolute Monocytes 0.2 0.0 - 1.3 10e9/L    Absolute Eosinophils 0.0 0.0 - 0.7 10e9/L    Absolute Basophils 0.0 0.0 - 0.2 10e9/L    Absolute Blasts 0.1 (H) 0 10e9/L    Poikilocytosis Slight     Ovalocytes Slight     Platelet Estimate Confirming automated cell count    ABO/Rh type and screen    Collection Time: 06/24/20 12:20 PM   Result Value Ref Range    ABO O     RH(D) Pos     Antibody Screen Neg     Test Valid Only At          [facility]    Specimen Expires 06/27/2020    Transfusion Rxn Blood Bank Notification    Collection Time: 06/24/20  2:54 PM   Result Value Ref Range    Transfusion Rxn Blood Bank Notification Order received            OK to use hydrocortisone (over the counter) for rash on hands. Please call if rash worsens or spreads.

## 2020-06-24 NOTE — PROGRESS NOTES
Infusion Nursing Note:  Stew rCooks presents today for 1 unit PRBC.    Patient seen by provider today: No   present during visit today: Faroese : Pt preferred to use his daughter in law Karen via phone.    Note: Patient denies new complaints today except a rash to his hands bilaterally. Per Karen, the rash is new or worsened since this last Monday. It is red, dime sized areas, not raised or hive like. Photos sent to Roma Wilde.     TORB per Roma Wilde/Michelle Sweeney RN 06/24/20 1300:  -Okay for patient to use Hydrocortisone cream to hands bilaterally for rash.     Intravenous Access:  Peripheral IV placed.    Treatment Conditions:  Lab Results   Component Value Date    HGB 7.4 06/24/2020     Lab Results   Component Value Date    WBC 1.2 06/24/2020      Lab Results   Component Value Date    ANEU 0.4 06/24/2020     Lab Results   Component Value Date    PLT 64 06/24/2020      Results reviewed, labs MET treatment parameters, ok to proceed with treatment.  Blood transfusion consent signed 4/2/20.      Post Infusion Assessment:  Patient completed 1 unit PRBC, and an end of infusion writer noted hives to patient's chin. The patient denied itching, but chin was very red with raised areas.  Blood return noted pre and post infusion.  Blood and tubing sent to blood bank with purple top tube. Blood transfusion order placed. 0.9NS started, Benadryl 50mg IVP and Hydrocortisone 100mg IVP given. Roma Wilde notified. Karen-patient's daughter in law and hospital tele- used to assess patient. The patient denied SOB, itching of any kind. BP elevated up to 170s/70s.     Hives subsided to nearly normal about 30 minutes after receiving medication.     TORB per Roma Wilde/Michelle Sweeney RN 06/24/20 @ 1515:  -Okay to discharge patient home when hives subside, as long as BP is not continuing to trend up.  Access discontinued per protocol.        Discharge Plan:   Patient declined prescription  refills.  Discharge instructions reviewed with: Patient.  Patient and/or family verbalized understanding of discharge instructions and all questions answered.  Copy of AVS reviewed with patient and/or family.  Patient will return 6/29/20 for next appointment.  Patient discharged in stable condition accompanied by: [facility] services to door. Daughter in law Karen to .  Departure Mode: Wheelchair.  Face to Face time: 20 min.    Michelle Sweeney RN

## 2020-06-25 NOTE — CONSULTS
Laboratory Medicine and Pathology  Transfusion Medicine- Transfusion Reaction    Stew Crooks MRN# [id number]   YOB: 1945 Age: 75 year old   Date of Reaction: 20       Transfusion Reaction Evaluation   Impression  Mild allergic reaction    Recommendation    Transfuse as needed.       ----------------------------------    History  Stew Crooksis a 75 year old male with MDS. After receiving a unit of RBCs he developed hives on his chin. No itching or respiratory symptoms. Vital signs were stable. He was treated with benadryl an hydrocortisone.     Reported Symptoms  Urticaria    Blood Bank Investigation  Product Type: RBC  Unit Number: [id number]  Amount Remainin mL  Post-Transfusion Clerical Check: Correct  ABO/Rh: The unit type was O pos and the patient was O pos with negative antibody screen  LUZ MARINA: N/A  Post-Transfusion Plasma: N/A      [facility]ance  Case Definition: Probable minor allergic reaction  Severity: Non-severe  Imputability: Definite      Daxa Nix MD  Transfusion Medicine Fellow  [phone]     Attestation: I have reviewed the pertinent lab and clinical data, I have discussed the patient's work-up with the fellow (Dr. Daxa Nix), and I agree with the impression and recommendation as outlined in her note.  This was a non-severe, probable minor allergic reaction of definite imputability.    Dagoberto Arevalo M.D.  Professor, Transfusion Medicine  Laboratory Medicine & Pathology  Pager: [phone]

## 2020-06-29 NOTE — PATIENT INSTRUCTIONS
--You will receive 1 unit of RBC's tomorrow with your chemo appointment    Contact Numbers  [facility]: [phone]    After Hours:  [phone]  Triage: [phone]    Please call the [facility] Triage line if you experience a temperature greater than or equal to 100.5, shaking chills, have uncontrolled nausea, vomiting and/or diarrhea, dizziness, shortness of breath, chest pain, bleeding, unexplained bruising, or if you have any other new/concerning symptoms, questions or concerns.     If it is after hours, weekends, or holidays, please call the main hospital  at  [phone] and ask to speak to the Oncology doctor on call.     If you are having any concerning symptoms or wish to speak to a provider before your next infusion visit, please call your care coordinator or triage to notify them so we can adequately serve you.     If you need a refill on a narcotic prescription or other medication, please call triage before your infusion appointment.

## 2020-06-29 NOTE — PROGRESS NOTES
"Infusion Nursing Note:  Stew Crooks presents today for C3D1 Vidaza.    Patient seen by provider today: No   present during visit today: Yes, Language: Belarusian.     Note: Pt reporting he \"feels fine\".  No s/s of infection and denies any bleeding issues.  Pt will receive 1 unit of RBCs tomorrow with D2 Vidaza.    Intravenous Access:  Peripheral IV placed in lab.  PIV left intact for D2.    Treatment Conditions:  Lab Results   Component Value Date    HGB 7.3 06/29/2020     Lab Results   Component Value Date    WBC 1.3 06/29/2020      Lab Results   Component Value Date    ANEU 0.5 06/29/2020     Lab Results   Component Value Date    PLT 55 06/29/2020      Lab Results   Component Value Date     06/29/2020                   Lab Results   Component Value Date    POTASSIUM 4.3 06/29/2020           Lab Results   Component Value Date    MAG 1.9 06/01/2020            Lab Results   Component Value Date    CR 0.96 06/29/2020                   Lab Results   Component Value Date    ALFREDO 8.5 06/29/2020                Lab Results   Component Value Date    BILITOTAL 0.7 06/29/2020           Lab Results   Component Value Date    ALBUMIN 3.1 06/29/2020                    Lab Results   Component Value Date    ALT 19 06/29/2020           Lab Results   Component Value Date    AST 9 06/29/2020       Results reviewed, labs MET treatment parameters, ok to proceed with treatment.      Post Infusion Assessment:  Patient tolerated infusion without incident.  Blood return noted pre and post infusion.  Site patent and intact, free from redness, edema or discomfort.  No evidence of extravasations.       Discharge Plan:   Declined Rx refills.  Discharge instructions reviewed with: Patient.  Patient and/or family verbalized understanding of discharge instructions and all questions answered.  Copy of AVS reviewed with patient and/or family.  Patient will return 6/30 for D2 Vidaza-1 unit RBC.  Patient discharged in stable condition " accompanied by: self.  Departure Mode: Ambulatory.    Raven Anguiano RN

## 2020-06-29 NOTE — NURSING NOTE
Chief Complaint   Patient presents with     Blood Draw     labs drawn with piv start by rn.  vs taken     (Greenlandic  present over speaker phone during appt).  Labs drawn with PIV start by rn.  Pt tolerated well.  VS taken and pt checked in for next appt.  Calli Mary RN

## 2020-06-30 NOTE — PROGRESS NOTES
Infusion Nursing Note:  Stew Crooks presents today for Cycle 3 Day 2 Vidaza, ! Unit PRBC.    Patient seen by provider today: No   present during visit today: Yes, Language: Prydeinig (pt preferred to utilize daughter in law to help interpret via his cell phone).     Note: Pt denies any concerns overnight. Denies fevers/chills, SOB/cough or pain. Has a history of reacting to blood transfusion. Premedicated with Tylenol and Benadryl prior to transfusion.    Intravenous Access:  Peripheral IV intact from yesterday.    Treatment Conditions:  Labs from yesterday reviewed. Hgb 7.3 today; getting transfused with 1 unit PRBC today. Blood consent signed: 3/30/20.    Post Infusion Assessment:  Patient tolerated infusion without incident.  Blood return noted pre and post infusion.  Site patent and intact, free from redness, edema or discomfort.  No evidence of extravasations. Access kept intact per pt preference for day 3 tomorrow.    Discharge Plan:   Patient declined prescription refills.  AVS to patient via [employer].  Patient will return tomorrow for next appointment.   Patient discharged in stable condition accompanied by: self.  Departure Mode: Ambulatory.    Jillian Beach RN

## 2020-07-01 NOTE — PROGRESS NOTES
Infusion Nursing Note:  Stew Crooks presents today for Cycle 3 Day 3 Vidaza.    Patient seen by provider today: No  Daughter-in-law used as interpretor via pt's cell phone.    Note: Pt states he felt tired after his blood transfusion, but otherwise does not have any concerns overnight.    Intravenous Access:  Peripheral IV intact from yesterday.    Treatment Conditions:  Labs from 6/29 reviewed. Will check labs again tomorrow.    Post Infusion Assessment:  Patient tolerated infusion without incident.  Blood return noted pre and post infusion.  Site patent and intact, free from redness, edema or discomfort.  No evidence of extravasations. Access discontinued per protocol.     Discharge Plan:   AVS to patient via MYCHART.  Patient will return tomorrow for next appointment.   Patient discharged in stable condition accompanied by: self.  Departure Mode: Ambulatory.    Jillian Beach RN

## 2020-07-02 NOTE — NURSING NOTE
Chief Complaint   Patient presents with     Labs Only     PIV placed by RN, VS taken     PIV placed by RN. VS taken. Labs drawn. Pt checked into next appt and discharged to [facility].         Kacie Niño RN

## 2020-07-03 NOTE — PATIENT INSTRUCTIONS
Contact Numbers  [facility]: [phone]    After Hours:  [phone]  Triage: [phone]    Please call the [facility] Triage line if you experience a temperature greater than or equal to 100.5, shaking chills, have uncontrolled nausea, vomiting and/or diarrhea, dizziness, shortness of breath, chest pain, bleeding, unexplained bruising, or if you have any other new/concerning symptoms, questions or concerns.     If it is after hours, weekends, or holidays, please call the main hospital  at  [phone] and ask to speak to the Oncology doctor on call.     If you are having any concerning symptoms or wish to speak to a provider before your next infusion visit, please call your care coordinator or triage to notify them so we can adequately serve you.     If you need a refill on a narcotic prescription or other medication, please call triage before your infusion appointment.         July 2020 Sunday Monday Tuesday Wednesday Thursday Friday Saturday                  1    P ONC INFUSION 60   2:30 PM   (60 min.)    ONCOLOGY INFUSION   [facility] 2    [facility]   2:30 PM   (15 min.)   [facility]   [facility] Lab Draw    P ONC INFUSION 60   3:00 PM   (60 min.)    ONCOLOGY INFUSION   [facility] 3    P ONC INFUSION 60   2:30 PM   (60 min.)    ONCOLOGY INFUSION   [facility] 4       5     6     7     8     9     10     11       12     13     14     15     16     17     18       19     20    TELEPHONE VISIT RETURN  10:20 AM   (50 min.)   Samantha Garcia PA-C   [facility] D[facility]   1:30 PM   (15 min.)    [facility] D[facility]   [facility] Lab Draw    P ONC INFUSION 60   2:00 PM   (60 min.)    ONCOLOGY INFUSION   [facility] 21    [facility] ONC INFUSION 60   3:30 PM   (60 min.)    ONCOLOGY INFUSION   [facility] 22    P ONC  INFUSION 60  10:00 AM   (60 min.)    ONCOLOGY INFUSION   [facility] 23    [facility] ONC INFUSION 60   2:00 PM   (60 min.)    ONCOLOGY INFUSION   [facility] 24    [facility] ONC INFUSION 60   1:30 PM   (60 min.)    ONCOLOGY INFUSION   [facility] 25       26     27     28     29     30     31                      August 2020 Sunday Monday Tuesday Wednesday Thursday Friday Saturday                                 1       2     3     4     5     6     7     8       9     10     11     12     13     14     15       16     17     18     19     20     21     22       23     24     25     26     27     28     29       30     31                                              Lab Results:  No results found for this or any previous visit (from the past 12 hour(s)).

## 2020-07-03 NOTE — PROGRESS NOTES
Infusion Nursing Note:  Stew Crooks presents today for C3D5 Vidaza.    Patient seen by provider today: No   present during visit today: Language: Dominican. Patient insisted to use his son to interpret today, despite of offering an official .     Note: Patient feels well. No changes overnight. Instruction given to patient and son as per provider. No new complaints made. Appointment is not made by the time patient left the facility. Instructed patient and son if unable to see next few day to call . Verbalized understanding.       Intravenous Access:  Peripheral IV placed.    Treatment Conditions:  Lab Results   Component Value Date    HGB 9.8 07/02/2020     Lab Results   Component Value Date    WBC 1.6 07/02/2020      Lab Results   Component Value Date    ANEU 0.9 07/02/2020     Lab Results   Component Value Date    PLT 75 07/02/2020      Lab Results   Component Value Date     07/02/2020                   Lab Results   Component Value Date    POTASSIUM 4.2 07/02/2020           Lab Results   Component Value Date    MAG 1.9 06/01/2020            Lab Results   Component Value Date    CR 1.07 07/02/2020                   Lab Results   Component Value Date    ALFREDO 8.4 07/02/2020                Lab Results   Component Value Date    BILITOTAL 0.5 07/02/2020           Lab Results   Component Value Date    ALBUMIN 3.1 07/02/2020                    Lab Results   Component Value Date    ALT 31 07/02/2020           Lab Results   Component Value Date    AST 15 07/02/2020       Results reviewed, labs MET treatment parameters, ok to proceed with treatment.    TORB: 7/3/20/Roma Wilde Md/Mckenna Moreno RN/ Please schedule patient weekly for 3 weeks and to move 7/20 to 7/27. No change on November schedule.  Patient can come any day he is available.     Post Infusion Assessment:  Patient tolerated infusion without incident.  Blood return noted pre and post infusion.  Site patent and intact, free  from redness, edema or discomfort.  No evidence of extravasations.  Access discontinued per protocol.       Discharge Plan:   Patient declined prescription refills.  Discharge instructions reviewed with: Patient.  Patient and/or family verbalized understanding of discharge instructions and all questions answered.  Copy of AVS reviewed with patient and/or family.  Patient will return next week for next appointment.  Patient discharged in stable condition accompanied by: self.  Departure Mode: Ambulatory.  Face to Face time: 12.    MEENU HANNON RN

## 2020-07-10 PROBLEM — D69.6 THROMBOCYTOPENIA (H): Status: ACTIVE | Noted: 2020-01-01

## 2020-07-10 NOTE — NURSING NOTE
Chief Complaint   Patient presents with     Lab Only     venipuncture, vitals checked     Arlene Beauchamp RN on 7/10/2020 at 6:58 AM

## 2020-07-10 NOTE — PROGRESS NOTES
Infusion Nursing Note:  Stew Crooks presents today for 1 unit platelets and 2 unit RBCs.    Patient seen by provider today: No   present during visit today: Yes, Language: Macedonian. Pt preferred his Son to interpret, declined ipad       Note: Pt assessed upon arrival to infusion suite. Denies fever, chills or cough. Pt denies any bleeding signs/symptoms and states he does not feel any more fatigued or SOB than usual. Pt observed to be SOB after walking down the alejandra to his infusion chair. Hgb 8.0, platelets 13 today. No orders/parameters for platelet transfusion ordered. Dr. Pearce notified.     TORB: 7/10/20 @ 0745 Dr. Pearce/Shirley Rutherford, RN - Please transfuse 2 units RBCs and 1 unit platelets. Premedicate prior to transfusions. Okay for patient to return Wednesday 7/15/20    About 8 minutes after first unit of RBCs started, patient stated his left forearm above his IV and his back were itching. Blood stopped, NS started, Benadryl and Solumedrol given. Small hives noticed on upper forearm. Itching and hives improved shortly after. VSS. Dr. Pearce notified.     TORB: 7/10/20 @ 1138 Dr. Pearce/Shirley Rutherford, RN - Okay to restart unit of blood once symptoms are cleared. Okay to give a 2nd dose of tylenol prior to 2nd unit of RBCs    Pt completed both units without issue    Intravenous Access:  Peripheral IV placed.    Treatment Conditions:  Lab Results   Component Value Date    HGB 8.0 07/10/2020     Lab Results   Component Value Date    WBC 0.9 07/10/2020      Lab Results   Component Value Date    ANEU 0.3 07/10/2020     Lab Results   Component Value Date    PLT 13 07/10/2020      Results reviewed, labs MET treatment parameters, ok to proceed with treatment.  Blood transfusion consent signed 3/30/20.    Post Infusion Assessment:  Patient tolerated infusion poorly due to : Hypersensitivity: Did patient have a hypersensitivity reaction? : Yes  Drug or Product name: RBCs  Were pre-meds administered?:  Yes  If Yes, what pre-meds were administered?: Acetaminophen (Tylenol);Diphenhydramine (Benadryl)  First or Subsequent treatment: Subsequent infusion  Rate of infusion when patient had hypersensitivity reaction: 120ml/hr  Time the hypersensitivity reaction was first recognized: 1120  Symptoms observed or reported (select all that apply): Itching;Hives  Interventions/treatment following reaction: Infusion stopped;Hypersensitivity medications administered  What hypersensitivity medications were administered?: DiphendydrAMINE (benadryl);Methylprednisolone  Name of provider notified: Dr. Pearce  Time provider notified: 1131  Type of notification (select all that apply): Paged/Phone  Was the patient re-challenged today?: Yes - tolerated well  Blood return noted pre and post infusion.  Site patent and intact, free from redness, edema or discomfort.  No evidence of extravasations.  Access discontinued per protocol.     Discharge Plan:   Patient declined prescription refills.  Copy of AVS reviewed with patient and/or family.  Patient will return 7/15/20 for next appointment.  Patient discharged in stable condition accompanied by: self.  Departure Mode: Ambulatory.    Sarah Rutherford RN

## 2020-07-10 NOTE — PATIENT INSTRUCTIONS
[facility] Triage and after hours / weekends / holidays:  [phone]    Please call the triage or after hours line if you experience a temperature greater than or equal to 100.5, shaking chills, have uncontrolled nausea, vomiting and/or diarrhea, dizziness, shortness of breath, chest pain, bleeding, unexplained bruising, or if you have any other new/concerning symptoms, questions or concerns.      If you are having any concerning symptoms or wish to speak to a provider before your next infusion visit, please call your care coordinator or triage to notify them so we can adequately serve you.     If you need a refill on a narcotic prescription or other medication, please call before your infusion appointment.             July 2020 Sunday Monday Tuesday Wednesday Thursday Friday Saturday                  1    [facility] ONC INFUSION 60   2:30 PM   (60 min.)    ONCOLOGY INFUSION   [facility] 2    [facility]   2:30 PM   (15 min.)   [facility]   [facility] Lab Draw    [facility] ONC INFUSION 60   3:00 PM   (60 min.)    ONCOLOGY INFUSION   [facility] 3    [facility] ONC INFUSION 60   2:30 PM   (60 min.)    ONCOLOGY INFUSION   [facility] 4       5     6     7     8     9     10    [facility]   6:45 AM   (60 min.)   [facility]    [facility] ONC INFUSION 360   7:30 AM   (360 min.)    ONCOLOGY INFUSION   [facility] 11       12     13     14     15    [facility]   6:45 AM   (15 min.)    [facility]    [facility] ONC INFUSION 360   7:00 AM   (360 min.)    ONCOLOGY INFUSION   [facility] 16     17     18       19     20    [facility]   1:30 PM   (15 min.)    [facility] D[facility]   [facility] Lab Draw    [facility] ONC INFUSION 60   2:00 PM   (60 min.)    ONCOLOGY INFUSION   [facility] 21    [facility] ONC INFUSION  60   3:30 PM   (60 min.)   [facility] ONCOLOGY INFUSION   [facility] 22    [facility]   9:15 AM   (15 min.)    [facility] D[facility]   [facility] Lab Draw    [facility] ONC INFUSION 360  10:00 AM   (360 min.)   [facility] ONCOLOGY INFUSION   [facility] 23    [facility] ONC INFUSION 60   2:00 PM   (60 min.)    ONCOLOGY INFUSION   [facility] 24    [facility] ONC INFUSION 60   1:30 PM   (60 min.)    ONCOLOGY INFUSION   [facility] 25       26     27    TELEPHONE VISIT RETURN   2:50 PM   (50 min.)   Samantha Garcia PA-C   [facility] 28     29    [facility] D[facility]   7:00 AM   (15 min.)    [facility] DRAW   [facility] Lab Draw    P ONC INFUSION 360   7:30 AM   (360 min.)    ONCOLOGY INFUSION   [facility] 30 31 August 2020 Sunday Monday Tuesday Wednesday Thursday Friday Saturday                                 1       2     3     4     5     6     7     8       9     10     11     12     13     14     15       16     17     18     19     20     21     22       23     24     25     26     27     28     29       30     31                                              Lab Results:  Recent Results (from the past 12 hour(s))   *CBC with platelets differential    Collection Time: 07/10/20  6:56 AM   Result Value Ref Range    WBC 0.9 (LL) 4.0 - 11.0 10e9/L    RBC Count 2.70 (L) 4.4 - 5.9 10e12/L    Hemoglobin 8.0 (L) 13.3 - 17.7 g/dL    Hematocrit 23.6 (L) 40.0 - 53.0 %    MCV 87 78 - 100 fl    MCH 29.6 26.5 - 33.0 pg    MCHC 33.9 31.5 - 36.5 g/dL    RDW 13.5 10.0 - 15.0 %    Platelet Count 13 (LL) 150 - 450 10e9/L    Diff Method Manual Differential     % Neutrophils 28.2 %    % Lymphocytes 55.7 %    % Monocytes 3.4 %    % Eosinophils 0.0 %    % Basophils 0.6 %    % Metamyelocytes 2.9 %    % Myelocytes 4.6 %    % Blasts 4.6 %    Nucleated RBCs 1 (H) 0 /100    Absolute Neutrophil  0.3 (LL) 1.6 - 8.3 10e9/L    Absolute Lymphocytes 0.5 (L) 0.8 - 5.3 10e9/L    Absolute Monocytes 0.0 0.0 - 1.3 10e9/L    Absolute Eosinophils 0.0 0.0 - 0.7 10e9/L    Absolute Basophils 0.0 0.0 - 0.2 10e9/L    Absolute Metamyelocytes 0.0 0 10e9/L    Absolute Myelocytes 0.0 0 10e9/L    Absolute Blasts 0.0 0 10e9/L    Absolute Nucleated RBC 0.0     Poikilocytosis Slight    ABO/Rh type and screen    Collection Time: 07/10/20  6:57 AM   Result Value Ref Range    Units Ordered 2     ABO O     RH(D) Pos     Antibody Screen Neg     Test Valid Only At          [facility]    Specimen Expires 07/13/2020     Crossmatch Red Blood Cells    Blood component    Collection Time: 07/10/20  6:57 AM   Result Value Ref Range    Unit Number [id number]     Blood Component Type Red Blood Cells Leukocyte Reduced     Division Number 00     Status of Unit Released to care unit 07/10/2020 1510     Blood Product Code [id number]     Unit Status ISS    Blood component    Collection Time: 07/10/20  6:57 AM   Result Value Ref Range    Unit Number [id number]     Blood Component Type Red Blood Cells Leukocyte Reduced     Division Number 00     Status of Unit Released to care unit 07/10/2020 1407     Blood Product Code [id number]     Unit Status ISS    Platelets prepare order unit    Collection Time: 07/10/20  8:23 AM   Result Value Ref Range    Blood Component Type PLT Pheresis     Units Ordered 1    Blood component    Collection Time: 07/10/20  8:23 AM   Result Value Ref Range    Unit Number [id number]     Blood Component Type PlateletPheresis,LeukoRed Irrad (Part 2)     Division Number 00     Status of Unit Released to care unit 07/10/2020 1509     Blood Product Code I6829S88     Unit Status ISS    Transfusion reaction hives    Collection Time: 07/10/20 11:28 AM   Result Value Ref Range    TRX Interpretation PENDING    Transfusion Rxn Blood Bank Notification    Collection Time: 07/10/20 11:38 AM    Result Value Ref Range    Transfusion Rxn Blood Bank Notification Order received

## 2020-07-13 NOTE — PROGRESS NOTES
Laboratory Medicine and Pathology  Transfusion Medicine- Transfusion Reaction    Stew Crooks MRN# [id number]   YOB: 1945 Age: 75 year old   Date of Reaction: 7/10/2020       Transfusion Reaction Evaluation   Impression  Minor allergic transfusion reaction    Recommendation    1. Transfuse as needed.    2. No evidence of immune-mediated hemolysis     ----------------------------------    History  Stew Benitez a 75 year old male with a past medical history of MDS who arrived in the infusion center for transfusion.  The patient was premedicated with diphenhydramine and acetaminophen prior to the transfusions.  He was transfused one platelet unit (unit# [id number]) followed by one RBC unit (unit # [id number]). While he was being transfused the RBC unit  a few urticarial lesions were noted. The unit was paused and he was treated with additional benadryl and solumedrol, after which the transfusion was resumed and a second RBC unit was started and completed.      Reported Symptoms  Hives    Vitals    His vitals remained stable throughout    Blood Bank Investigation  Product Type: PRBCs  Unit Number: [id number] and [id number]  Amount Remainin mL and 0 mL  Post-Transfusion Clerical Check: Correct  ABO/Rh: The unit types were both O+ and the patient was O+. The units were compatible.  Post transfusion serum color: straw    [facility] Hemovigilance  Case Definition: Definitive  Severity: Non-severe  Imputability: Definite       Basilio Rossi MD  2020 12:35 PM  Transfusion Medicine Fellow  Pager: [phone]    ATTENDING ATTESTATION:  I have personally reviewed the clinical and laboratory features of the reported transfusion reaction. I agree with the note above by the Transfusion Medicine Fellow, Dr. Basilio Rossi to which I have made edits.    The patient experienced hives after a platelet and part of a RBC transfusion. Both units are implicated as a potential cause for the symptoms. The  symptoms responded to steroid and antihistamine treatment. The reaction meets the definition of a non-severe allergic reaction of definite imputatbility.  Recommend transfuse as needed.  The patient has had previous mild allergic reactions (4/8/2020 and 6/24/2020). Consider premedication with steroids and/or antihistamine.    Michelle Dean M.D., Ph.D.  Attending Physician  Division of Transfusion Medicine  Department of Laboratory [place] [address]  Pager [phone]

## 2020-07-14 NOTE — PROGRESS NOTES
Writer placed call to Sunny, who has been interpreting for his father Stew. Per policy, if Stew is going to refuse  services, as has been reported by infusion, he needs to have a new waiver signed for refusal of  services. Writer discussed with Sunny who is not comfortable being his only  and wants to discuss utilizing services we offer with his father. Writer acknowledged that it is our preference but if Stew decides that he will not use them, an alternative plan will need to be made within the family and the waiver would need to be signed with use of  services to review the form with Stew. Sunny voiced understanding of plan and will update writer if any changes are to occur, infusion Charge RN informed, and direct line for writer provided to Sunny.

## 2020-07-15 NOTE — NURSING NOTE
Chief Complaint   Patient presents with     Blood Draw     labs drawn via PIV placed by RN in lab     /57 (BP Location: Left arm, Patient Position: Chair, Cuff Size: Adult Regular)   Pulse 86   Temp 98  F (36.7  C) (Oral)   Resp 20   Wt 68.4 kg (150 lb 12.8 oz)   SpO2 97%   BMI 22.27 kg/m      PIV placed right lower forearm by RN in lab for infusion and labs. Labs drawn and sent. Pt tolerated well.   Pt checked in for next appointment.    Mary Melendez, RN

## 2020-07-15 NOTE — PATIENT INSTRUCTIONS
[facility] Triage and after hours / weekends / holidays:  [phone]    Please call the triage or after hours line if you experience a temperature greater than or equal to 100.5, shaking chills, have uncontrolled nausea, vomiting and/or diarrhea, dizziness, shortness of breath, chest pain, bleeding, unexplained bruising, or if you have any other new/concerning symptoms, questions or concerns.      If you are having any concerning symptoms or wish to speak to a provider before your next infusion visit, please call your care coordinator or triage to notify them so we can adequately serve you.     If you need a refill on a narcotic prescription or other medication, please call before your infusion appointment.                 July 2020 Sunday Monday Tuesday Wednesday Thursday Friday Saturday                  1    [facility] 60   2:30 PM   (60 min.)    ONCOLOGY INFUSION   [facility] 2    [facility]   2:30 PM   (15 min.)   [facility]   [facility] Lab Draw    [facility] INF[facility]ION 60   3:00 PM   (60 min.)    ONCOLOGY INFUSION   [facility] 3    [facility] 60   2:30 PM   (60 min.)    ONCOLOGY INFUSION   [facility] 4       5     6     7     8     9     10    [facility]   6:45 AM   (60 min[facility]    [facility] INF[facility] 360   7:30 AM   (360 min.)    ONCOLOGY INFUSION   [facility] 11       12     13     14     15    [facility]   6:45 AM   (15 min.)    [facility]    [facility] 360   7:00 AM   (360 min.)    ONCOLOGY INFUSION   [facility] 16     17     18       19     20     21     22    [facility]   9:15 AM   (15 min.)    [facility]   [facility] Lab Draw    [facility] 360  10:00 AM   (360 min.)    ONCOLOGY INFUSION   [facility]  23     24     25       26     27    [facility]   9:15 AM   (15 min.)    [facility]   [facility] Lab Draw    [facility] INF[facility] 60  10:00 AM   (60 min.)    ONCOLOGY INFUSION   [facility]    TELEPHONE VISIT RETURN   2:50 PM   (50 min.)   Samantha Garcia PA-C   [facility] 28    [facility] INFUSION 60   9:30 AM   (60 min.)   [facility]   [facility] 29    [facility]   7:00 AM   (15 min.)    [facility] DRAW   [facility] Lab Draw    [facility] INF[facility]ION 360   7:30 AM   (360 min.)    ONCOLOGY INFUSION   [facility] 30    [facility] 60   1:30 PM   (60 min.)    ONCOLOGY INFUSION   [facility] 31    [facility] 60   1:00 PM   (60 min.)    ONCOLOGY INFUSION   [facility]                  August 2020 Sunday Monday Tuesday Wednesday Thursday Friday Saturday                                 1       2     3     4     5     6     7     8       9     10     11     12     13     14     15       16     17     18     19     20     21     22       23     24     25     26     27     28     29       30     31                                             Recent Results (from the past 24 hour(s))   Platelets prepare order unit    Collection Time: 07/15/20  7:00 AM   Result Value Ref Range    Blood Component Type PLT Pheresis     Units Ordered 1    Blood component    Collection Time: 07/15/20  7:00 AM   Result Value Ref Range    Unit Number [id number]     Blood Component Type PlateletPheresis LeukoReduced Irradiated     Division Number 00     Status of Unit Ready for patient 07/15/2020 0712     Blood Product Code D2957A37     Unit Status JAMSHID    *CBC with platelets differential    Collection Time: 07/15/20  7:31 AM   Result Value Ref Range    WBC 1.6 (L) 4.0 - 11.0 10e9/L    RBC Count 3.40 (L) 4.4 - 5.9 10e12/L    Hemoglobin 10.0 (L) 13.3 - 17.7 g/dL     Hematocrit 30.3 (L) 40.0 - 53.0 %    MCV 89 78 - 100 fl    MCH 29.4 26.5 - 33.0 pg    MCHC 33.0 31.5 - 36.5 g/dL    RDW 14.1 10.0 - 15.0 %    Platelet Count 30 (LL) 150 - 450 10e9/L    Diff Method PENDING

## 2020-07-15 NOTE — PROGRESS NOTES
Infusion Nursing Note:  Stew Crooks presents today for possible blood products.    Patient seen by provider today: No   present during visit today: Yes, Language: Mohawk Ipad.     Note: Patient reports feeling well today, no shortness of breath, dizziness, no noted change in fatigue. No bleeding, no fevers. Notes a pink dry patch of skin on his forehead, which is not itching or bothersome.    Intravenous Access:  Peripheral IV placed.    Treatment Conditions:  Lab Results   Component Value Date    HGB 10.0 07/15/2020     Lab Results   Component Value Date    WBC 1.6 07/15/2020      Lab Results   Component Value Date    ANEU 0.8 07/15/2020     Lab Results   Component Value Date    PLT 30 07/15/2020      Results reviewed, labs did NOT meet treatment parameters: orders to transfuse for Hgb less than 8 and platelets less than 20.  Asymptomatic, no blood products needed today.       Post Infusion Assessment:  Access discontinued per protocol.       Discharge Plan:   Patient declined prescription refills.  Copy of AVS reviewed with patient and/or family.  Patient will return 7/22 for next appointment.  Patient discharged in stable condition accompanied by: self.  Departure Mode: Ambulatory.  Face to Face time: 0.    Oneila Bernabe RN

## 2020-07-22 NOTE — PATIENT INSTRUCTIONS
[facility] Triage and after hours / weekends / holidays:  [phone]    Please call the triage or after hours line if you experience a temperature greater than or equal to 100.5, shaking chills, have uncontrolled nausea, vomiting and/or diarrhea, dizziness, shortness of breath, chest pain, bleeding, unexplained bruising, or if you have any other new/concerning symptoms, questions or concerns.      If you are having any concerning symptoms or wish to speak to a provider before your next infusion visit, please call your care coordinator or triage to notify them so we can adequately serve you.     If you need a refill on a narcotic prescription or other medication, please call before your infusion appointment.           July 2020 Sunday Monday Tuesday Wednesday Thursday Friday Saturday                  1    [facility] 60   2:30 PM   (60 min.)    ONCOLOGY INFUSION   [facility] 2    [facility]   2:30 PM   (15 min.)   [facility]   [facility] Lab Draw    [facility] INFUSION 60   3:00 PM   (60 min.)    ONCOLOGY INFUSION   [facility] 3    [facility] INF[facility]ION 60   2:30 PM   (60 min.)    ONCOLOGY INFUSION   [facility] 4       5     6     7     8     9     10    [facility]   6:45 AM   (60 min.)   [facility]    [facility] 360   7:30 AM   (360 min.)    ONCOLOGY INFUSION   [facility] 11       12     13     14     15    [facility]   6:45 AM   (15 min.)    [facility]    [facility] 360   7:00 AM   (360 min.)    ONCOLOGY INFUSION   [facility] 16     17     18       19     20     21     22    [facility]   9:15 AM   (15 min.)    [facility] DRAW   [facility] Lab Draw    [facility] INF[facility]ION 360  10:00 AM   (360 min.)    ONCOLOGY INFUSION   [facility] 23      24     25       26     27    [facility]   9:15 AM   (15 min.)    [facility] DRAW   [facility] Lab Draw    [facility] 60  10:00 AM   (60 min.)    ONCOLOGY INFUSION   [facility]    TELEPHONE VISIT RETURN   2:50 PM   (50 min.)   Samantha Garcia PA-C   [facility] 28    [facility] INFUSION 60   9:30 AM   (60 min.)   [facility]   [facility] 29    [facility]   7:00 AM   (15 min.)    [facility] DRAW   [facility] Lab Draw    [facility] INF[facility] 360   7:30 AM   (360 min.)    ONCOLOGY INFUSION   [facility] 30    [facility] 60   1:30 PM   (60 min.)    ONCOLOGY INFUSION   [facility] 31    [facility] 60   1:00 PM   (60 min.)    ONCOLOGY INFUSION   [facility]                  August 2020 Sunday Monday Tuesday Wednesday Thursday Friday Saturday                                 1       2     3     4     5     6     7     8       9     10     11     12     13     14     15       16     17     18     19     20     21     22       23     24     25     26     27     28     29       30     31                                             Recent Results (from the past 24 hour(s))   Platelets prepare order unit    Collection Time: 07/22/20  7:00 AM   Result Value Ref Range    Blood Component Type PLT Pheresis     Units Ordered 1    Blood component    Collection Time: 07/22/20  7:00 AM   Result Value Ref Range    Unit Number [id number]     Blood Component Type PlateletPheresis,LeukoRed Irrad (Part 2)     Division Number 00     Status of Unit Ready for patient 07/22/2020 0719     Blood Product Code [id number]     Unit Status JAMSHID    CBC with platelets differential    Collection Time: 07/22/20  9:48 AM   Result Value Ref Range    WBC 1.7 (L) 4.0 - 11.0 10e9/L    RBC Count 3.09 (L) 4.4 - 5.9 10e12/L    Hemoglobin 9.1 (L) 13.3 - 17.7 g/dL     Hematocrit 27.1 (L) 40.0 - 53.0 %    MCV 88 78 - 100 fl    MCH 29.4 26.5 - 33.0 pg    MCHC 33.6 31.5 - 36.5 g/dL    RDW 13.8 10.0 - 15.0 %    Platelet Count 38 (LL) 150 - 450 10e9/L    Diff Method Manual Differential     % Neutrophils 55.2 %    % Lymphocytes 31.6 %    % Monocytes 4.4 %    % Eosinophils 0.9 %    % Basophils 0.0 %    % Metamyelocytes 2.6 %    % Myelocytes 0.9 %    % Blasts 4.4 %    Nucleated RBCs 1 (H) 0 /100    Absolute Neutrophil 0.9 (L) 1.6 - 8.3 10e9/L    Absolute Lymphocytes 0.5 (L) 0.8 - 5.3 10e9/L    Absolute Monocytes 0.1 0.0 - 1.3 10e9/L    Absolute Eosinophils 0.0 0.0 - 0.7 10e9/L    Absolute Basophils 0.0 0.0 - 0.2 10e9/L    Absolute Metamyelocytes 0.0 0 10e9/L    Absolute Myelocytes 0.0 0 10e9/L    Absolute Blasts 0.1 (H) 0 10e9/L    Absolute Nucleated RBC 0.0     Poikilocytosis Slight     Ovalocytes Slight     Platelet Estimate Confirming automated cell count

## 2020-07-22 NOTE — PROGRESS NOTES
Infusion Nursing Note:  Stew Crooks presents today for possible blood transfusion, none needed.    Patient seen by provider today: No   present during visit today: Yes, Language: Turkish.     Note: Patient denies any fevers or chills.  He denies any bleeding, dizziness or sob.  He has a chronic cough which is at baseline.  He has some red spots on hands which are not new, using eucerin cream.  He does report some intermittent cramping in his legs at night.  He did research online and thinks that he has a magnesium deficiency, although he has never had his level checked.  Dr Pearce messaged at patient request to see if a magnesium level could be added on 7/27 when patient returns for his next appointment.      Intravenous Access:  Peripheral IV placed in lab    Treatment Conditions:  Lab Results   Component Value Date    HGB 9.1 07/22/2020     Lab Results   Component Value Date    WBC 1.7 07/22/2020      Lab Results   Component Value Date    ANEU 0.9 07/22/2020     Lab Results   Component Value Date    PLT 38 07/22/2020      Results reviewed, labs did NOT meet treatment parameters: Hgb <8.      Post Infusion Assessment:  Access discontinued per protocol.       Discharge Plan:   Patient declined prescription refills.  Discharge instructions reviewed with: Patient.  Patient and/or family verbalized understanding of discharge instructions and all questions answered.  AVS to patient via [facility]_.  Patient will return 7/27/2020 for next appointment.   Patient discharged in stable condition accompanied by: self.  Departure Mode: Ambulatory.  Face to Face time: 0.    Jillian Jones RN

## 2020-07-22 NOTE — NURSING NOTE
Chief Complaint   Patient presents with     Blood Draw     labs drawn with piv start by rn.  vs taken     (Albanian  present throughout appt).  Labs drawn with PIV start by rn.  Pt tolerated well.  VS taken and pt checked in for next appt.  Calli Mary RN

## 2020-07-27 NOTE — PATIENT INSTRUCTIONS
Contact Numbers  [facility]: [phone] (for symptom and scheduling needs)    Please call the [facility] Triage line if you experience a temperature greater than or equal to 100.4, shaking chills, have uncontrolled nausea, vomiting and/or diarrhea, dizziness, shortness of breath, chest pain, bleeding, unexplained bruising, or if you have any other new/concerning symptoms, questions or concerns.     If you are having any concerning symptoms or wish to speak to a provider before your next infusion visit, please call your care coordinator or triage to notify them so we can adequately serve you.     If you need a refill on a narcotic prescription or other medication, please call triage before your infusion appointment.

## 2020-07-27 NOTE — PROGRESS NOTES
Infusion Nursing Note:  Stew Crooks presents today for Cycle 4 Day 1 Vidaza.    Patient seen by provider today: Yes: JEY Elizalde to assess patient this afternoon   present during visit today: Yes: Ermelinda    Note: Patient feels well today.  Denies fevers or shortness of breath.  Patient has a cough, which is unchanged.  Patient has red spots on his hands, which are stable.    TORB JEY Elizalde/Cris Vasquez RN at 1045 on 7/27/2020  OK to proceed with Vidaza with ANC 0.7  OK to give PRBC for hgb 7.9 tomorrow    Intravenous Access:  Peripheral IV placed in lab.    Treatment Conditions:  Lab Results   Component Value Date    HGB 7.9 07/27/2020     Lab Results   Component Value Date    WBC 1.7 07/27/2020      Lab Results   Component Value Date    ANEU 0.7 07/27/2020     Lab Results   Component Value Date    PLT 49 07/27/2020      Lab Results   Component Value Date     07/27/2020                   Lab Results   Component Value Date    POTASSIUM 4.4 07/27/2020            Lab Results   Component Value Date    CR 1.00 07/27/2020                   Lab Results   Component Value Date    ALFREDO 8.6 07/27/2020                Lab Results   Component Value Date    BILITOTAL 0.9 07/27/2020           Lab Results   Component Value Date    ALBUMIN 3.1 07/27/2020                    Lab Results   Component Value Date    ALT 18 07/27/2020           Lab Results   Component Value Date    AST 9 07/27/2020       Results reviewed, labs MET treatment parameters, ok to proceed with treatment.      Post Infusion Assessment:  Patient tolerated infusion without incident.  Blood return noted pre and post infusion.  Site patent and intact, free from redness, edema or discomfort.  No evidence of extravasations.  Access discontinued per protocol.       Discharge Plan:   Patient declined prescription refills.  Discharge instructions reviewed with: Patient.  Patient and/or family verbalized understanding of discharge  instructions and all questions answered.  Copy of AVS reviewed with patient and/or family.  Patient will return 7/28/2020 for next appointment.  Patient discharged in stable condition accompanied by: self.  Departure Mode: Ambulatory.    Cris Vasquez RN

## 2020-07-27 NOTE — PROGRESS NOTES
"Stew Crooks is a 75 year old male who is being evaluated via a billable telephone visit.      The patient has been notified of following:     \"This telephone visit will be conducted via a call between you and your physician/provider. We have found that certain health care needs can be provided without the need for a physical exam.  This service lets us provide the care you need with a short phone conversation.  If a prescription is necessary we can send it directly to your pharmacy.  If lab work is needed we can place an order for that and you can then stop by our lab to have the test done at a later time.    Telephone visits are billed at different rates depending on your insurance coverage. During this emergency period, for some insurers they may be billed the same as an in-person visit.  Please reach out to your insurance provider with any questions.    If during the course of the call the physician/provider feels a telephone visit is not appropriate, you will not be charged for this service.\"    Patient has given verbal consent for Telephone visit?  Yes    What phone number would you like to be contacted at? [phone] please call  service at [phone].     How would you like to obtain your AVS? MyChart    Patient reported vitals added.   0/10 pain scale.     Refill(s) on medication for cough, patient unsure if he needs to continue.      ID # [id number]  Sammie Rios CMA    Phone call duration: 0 minutes    Maria A Higgins PA-C    Called Stew Crooks for telephone visit. He is worried that our visit would not be covered by insurance so he wanted to cancel the appointment until he can check with his insurance company regarding coverage. Will have care management follow up tomorrow morning to see if he was able to get clarity as he started his Vidaza cycle today 7/27/2020.     Maria A Higgins PA-C  [facility]  [address], [place] [id number] [phone]    The patient " was seen in conjunction with Maria A Higgins PA-C  who served as a scribe for today's visit. I have reviewed and edited the above note, and agree with the above findings and plan.    RADHA ElizaldeC

## 2020-07-28 NOTE — PROGRESS NOTES
Writer placed call to Sunny to discuss concerns about insurance coverage for telephone visit. Sunny believes there is no issue with insurance coverage for telephone visits and states that the issue is with how the visit is presented to Stew at the start of the encounter. Sunny has c/o  services not always being thorough in their translation and that the patient isn't understanding the plan of care or what is being said to him. In the past, Sunny has needed to correct the translation and feels that the interpreters they get, sometimes not all the time, are not good at medical interpretation. Writer offered to call weekly when Chio has appointments and Sunny, with the help of Stew, will provide feedback on days that did not go well so feedback could be passed on to  services, Sunny declined wilmar's offer of discussing with Patient Relations. Sunny was amenable to having telephone visit set up for this week with Samantha and will help facilitate any concerns.

## 2020-07-28 NOTE — PATIENT INSTRUCTIONS
[facility] [place] Main Line: [phone]    Call triage nurse with chills and/or temperature greater than or equal to 100.4, uncontrolled nausea/vomiting, diarrhea, constipation, dizziness, shortness of breath, chest pain, bleeding, unexplained bruising, or any new/concerning symptoms, questions/concerns.   If you are having any concerning symptoms or wish to speak to a provider before your next infusion visit, please call your care coordinator or triage to notify them so we can adequately serve you.   Nurse Triage line:  [phone]    If after hours, weekends, or holidays, call main hospital  and ask for Oncology doctor on call @ [phone]      July 2020 Sunday Monday Tuesday Wednesday Thursday Friday Saturday                  1    [facility] INFUSION 60   2:30 PM   (60 min.)    ONCOLOGY INFUSION   [facility] 2    [facility] DRAW   2:30 PM   (15 min.)    [facility] DRAW   [facility] Draw    [facility] INFUSION 60   3:00 PM   (60 min.)    ONCOLOGY INFUSION   [facility] 3    [facility] INFUSION 60   2:30 PM   (60 min.)    ONCOLOGY INFUSION   [facility] 4       5     6     7     8     9     10    [facility] DRAW   6:45 AM   (60 min.)    [facility] DRAW   [facility] Draw    [facility] INFUSION 360   7:30 AM   (360 min.)    ONCOLOGY INFUSION   [facility] 11       12     13     14     15    [facility] DRAW   6:45 AM   (15 min.)    [facility] DRAW   [facility] Draw    [facility] ON[facility] INFUSION 360   7:00 AM   (360 min.)    ONCOLOGY INFUSION   [facility] 16     17     18       19     20     21     22    [facility] DRAW   9:15 AM   (15 min.)    [facility] DRAW   [facility] Draw    [facility] INFUSION 360  10:00 AM   (360 min.)    ONCOLOGY INFUSION   [facility] 23     24     25       26     27    [facility] DRAW    9:15 AM   (15 min.)   [facility]   [facility] Lab Draw    [facility] ONC INFUSION 60  10:00 AM   (60 min.)    ONCOLOGY INFUSION   [facility]    TELEPHONE VISIT RETURN   2:50 PM   (50 min.)   Samantha Garcia PA-C   [facility] 28    [facility] ONC INFUSION 60   9:30 AM   (60 min.)    ONCOLOGY INFUSION   [facility] 29    [facility] DRAW   7:00 AM   (15 min.)   [facility] LAB DRAW   [facility] Lab Draw    [facility] ON[facility] INFUSION 360   7:30 AM   (360 min.)    ONCOLOGY INFUSION   [facility] 30    [facility] ON[facility] INFUSION 60   1:30 PM   (60 min.)    ONCOLOGY INFUSION   [facility] 31    [facility] INFUSION 60   1:00 PM   (60 min.)    ONCOLOGY INFUSION   [facility]                  August 2020 Sunday Monday Tuesday Wednesday Thursday Friday Saturday                                 1       2     3     4     5     6     7     8       9     10     11     12     13     14     15       16     17     18     19     20     21     22       23     24     25     26     27     28     29       30     31                                              Lab Results:  No results found for this or any previous visit (from the past 12 hour(s)).

## 2020-07-28 NOTE — PROGRESS NOTES
Infusion Nursing Note:  Stew Crooks presents today for C4D2 Vidaza/2 units PRBC's.    Patient seen by provider today: No   present during visit today: Not Applicable.    Note: Patient reported to clinic today stating he felt well.    TORB: 0955 7/28/2020 Samantha Garcia PA-C/Abebe Sen RN  -ok to proceed with treatment today.    Patient had small coughing episode at end of infusions today. GARY called to help translate. GARY said he was just on abx for cough and was wondering if he needs more. Samantha Garcia PA-C notified. LSCTA.    Intravenous Access:  Peripheral IV placed 7/27/2020.    Treatment Conditions:  Lab Results   Component Value Date    HGB 7.9 07/27/2020     Lab Results   Component Value Date    WBC 1.7 07/27/2020      Lab Results   Component Value Date    ANEU 0.7 07/27/2020     Lab Results   Component Value Date    PLT 49 07/27/2020      Lab Results   Component Value Date     07/27/2020                   Lab Results   Component Value Date    POTASSIUM 4.4 07/27/2020           Lab Results   Component Value Date    MAG 1.9 06/01/2020            Lab Results   Component Value Date    CR 1.00 07/27/2020                   Lab Results   Component Value Date    ALFREDO 8.6 07/27/2020                Lab Results   Component Value Date    BILITOTAL 0.9 07/27/2020           Lab Results   Component Value Date    ALBUMIN 3.1 07/27/2020                    Lab Results   Component Value Date    ALT 18 07/27/2020           Lab Results   Component Value Date    AST 9 07/27/2020       Results reviewed, labs MET treatment parameters, ok to proceed with treatment. Hgb 7.9, transfuse if less than 8.1  Results reviewed, labs did NOT meet treatment parameters: Plt 49, transfuse if less than 20.  Consent signed 3/30/2020    Post Infusion Assessment:  Patient tolerated infusion without incident.  Blood return noted pre and post infusion.  Site patent and intact, free from redness, edema or discomfort.  No  evidence of extravasations.  Access left in for tomorrow's infusion per pt request. Brisk blood return noted and flushed with ease.     Discharge Plan:   Patient declined prescription refills.  Discharge instructions reviewed with: Patient.  Patient and/or family verbalized understanding of discharge instructions and all questions answered.  Copy of AVS reviewed with patient and/or family.  Patient will return 7/29/2020 for next appointment.  Patient discharged in stable condition accompanied by: self.  Departure Mode: Ambulatory.  Face to Face time: 20+ minutes.    Abebe Sen RN

## 2020-07-29 NOTE — PROGRESS NOTES
Writer received call from Sunny, son of Stew, to discuss today's visit. Again, Stew has reported to Sunny that he was told that the treatment is not working and that we are stopping treatment. Writer advised that the information in his clinic visit state otherwise and that we will be looking to get a new BMBx week of 8/24 to evaluate level of efficacy of the azacitidine treatment. Writer read assessment/plan as noted by Samantha Garcia PA-C and discussed plan of care going forward. Patient will have in-person provider visits from now on and Sunny is able to join those visit in-person or by phone call so that open communication is kept as Stew reports alternative information based on interpretation misunderstanding or otherwise. Sunny voiced understanding of plan and is in agreement with in-person visits as telephone visits have not proven successful for him.

## 2020-07-29 NOTE — NURSING NOTE
Chief Complaint   Patient presents with     Blood Draw     Labs drawn via PIV (previously placed) by RN in lab. Saline locked. VS taken.     Zuly Sheppard RN

## 2020-07-29 NOTE — PROGRESS NOTES
Oncology/Hematology Visit Note  Jul 29, 2020    Reason for Visit: f/u MDS    PROBLEM LIST:  1. 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  No FLT3 ITD, but not enough sample for D835 analysis  - History of pancytopenia, December 2015, with negative evaluation except for bone marrow biopsy possibly suspicious for MDS. Normal cytogenetics  - Initiated treatment with azacitidine 4/19/20, in attempts to decrease in his need for transfusion and to improve his quality of life. Baseline counts on 4/20: Hgb 7.2, platelets 134, ANC 1.3       2.  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 Received chemoradiation with cisplatin/Etoposide 4/14/14-5/19/14.  6000 cGy in 30 fractions completed 6/9/14 . Difficult course, with hospitalizations for neutropenic fevers esophagitis. No evidence of lung cancer recurrence.    Interval history: Mr. Crooks was seen in infusion with assistance of a [place]  on iPad. He is feeling well. He continues to tolerate treatment fairly well. He has nausea and constipation from treatment and has noticed violaceous lesions on the backs on his hands. These do not itch. Nausea is throughout the whole month after treatment and is controlled well with antiemetics. He does not have to use these daily. Uses dulcolax about once per week for constipation. No diarrhea or bladder concerns. Energy level is stable. He takes 1 nap per day. Breathing is okay. Chronic cough is unchanged. No fevers/chills. No bleeding or bruising, no mucositis, abdominal pain, or headaches. His questions today are is he responding to treatment and does he need to be on levaquin.     Current Outpatient Medications   Medication Sig Dispense Refill     ALBUTEROL 108 (90 BASE) MCG/ACT inhaler INHALE 2 PUFFS BY MOUTH EVERY 4 HOURS AS NEEDED  3      DULERA 100-5 MCG/ACT oral inhaler INHALE 2 PUFFS BY MOUTH TWICE DAILY  3     famotidine (PEPCID) 20 MG tablet        fluconazole (DIFLUCAN) 200 MG tablet Take 1 tablet (200 mg) by mouth daily 30 tablet 1     levofloxacin (LEVAQUIN) 250 MG tablet Take 1 tablet (250 mg) by mouth daily 30 tablet 1     loperamide (IMODIUM) 2 MG capsule Take 1 capsule (2 mg) by mouth 4 times daily as needed for diarrhea 30 capsule 0     LORazepam (ATIVAN) 0.5 MG tablet Take 1 tablet (0.5 mg) by mouth every 4 hours as needed (Anxiety, Nausea/Vomiting or Sleep) 30 tablet 5     ondansetron (ZOFRAN) 8 MG tablet Take 1 tablet (8 mg) by mouth every 8 hours as needed (Nausea/Vomiting) 10 tablet 5     oxymetazoline (AFRIN) 0.05 % nasal spray Spray 2 sprays into both nostrils 2 times daily as needed for congestion or other (epistaxis) 1 Bottle 0     prochlorperazine (COMPAZINE) 10 MG tablet Take 0.5 tablets (5 mg) by mouth every 6 hours as needed (Nausea/Vomiting) 30 tablet 5          Allergies   Allergen Reactions     Bactrim [Sulfamethoxazole W/Trimethoprim] Hives and Itching     Required stat IV benadryl     Blood Transfusion Related (Informational Only)      Developed hives and dizziness post blood transfusion on 4/8/20.       PHYSICAL EXAM:  /62 (BP Location: Left arm, Patient Position: Sitting, Cuff Size: Adult Regular)   Pulse 63   Temp 98.1  F (36.7  C) (Oral)   Resp 16   Wt 69.1 kg (152 lb 6.4 oz)   SpO2 97%   BMI 22.51 kg/m    General: Alert, oriented, pleasant, NAD  HEENT: Normocephalic, atraumatic, PERRL. Moist mucus membranes, no lesions, or thrush  Lungs: CTA bilaterally, normal work of breathing  Cardiac: RRR, S1, S2, no murmurs  Abdomen: Soft, nontender, nondistended. Normoactive bowel sounds.   Neuro: CNII-XII grossly intact  Extremities: No pedal edema  Skin: Violaceous plaques on extensor fingers and dorsum of hands and wrists.       Labs:    7/27/2020 09:53   Sodium 142   Potassium 4.4   Chloride 111 (H)    Carbon Dioxide 24   Urea Nitrogen 22   Creatinine 1.00   GFR Estimate 73   GFR Estimate If Black 85   Calcium 8.6   Anion Gap 6   Albumin 3.1 (L)   Protein Total 7.1   Bilirubin Total 0.9   Alkaline Phosphatase 71   ALT 18   AST 9   Glucose 150 (H)   WBC 1.7 (L)   Hemoglobin 7.9 (L)   Hematocrit 24.0 (L)   Platelet Count 49 (LL)   RBC Count 2.67 (L)   MCV 90   MCH 29.6   MCHC 32.9   RDW 13.6   Diff Method Manual Differential   % Neutrophils 40.3   % Lymphocytes 46.5   % Monocytes 2.6   % Eosinophils 0.0   % Basophils 1.8   % Metamyelocytes 3.5   % Blasts 5.3   Nucleated RBCs 1 (H)   Absolute Neutrophil 0.7 (L)   Absolute Lymphocytes 0.8   Absolute Monocytes 0.0   Absolute Eosinophils 0.0   Absolute Basophils 0.0   Absolute Metamyelocytes 0.1 (H)   Absolute Blasts 0.1 (H)   Absolute Nucleated RBC 0.0   Poikilocytosis Slight   Platelet Estimate Confirming automated cell count   ABO O   RH(D) Pos   Antibody Screen Neg   Test Valid Only At [facility]   Specimen Expires 07/30/2020   Blood Component Type Red Blood Cells Leukocyte Reduced   Unit Number [id number]   Division Number 00   Status of Unit Released to care unit   Crossmatch Red Blood Cells   Unit Status ISS         Impression/plan:   MDS with transfusion dependence  - 5.6% blasts, diagnosed 3/30/20.  IPSS-R 4.5  Intermediate- based on normal cytogenetics, Hgb <7, normal WBC and platelets. 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  No FLT3 ITD, but not enough sample for D835 analysis.  - Initiated palliative treatment with azacitidine 4/19/20  -Dr. Pearce delayed cycle 3 to allow for count recovery. Azacitidine was dose reduced 50% following this. Cycle 3 started 6/29/20. Plan for repeat bone marrow biopsy after cycle 4.      -Reviewed it is hard to tell if there has been any response from treatment based on his counts alone. The marrow will be the definite check to see if MDS is responding or not.   -Remains transfusion  dependent. Transfuse 1 U PRBC for hgb <8, platelet <10K. Received pRBCs yesterday. Continue to check counts week with PRN transfusions.     ID: Neutropenic at last check at ANC of 700, but afebrile.   -Continue levofloxacin 250 mg daily and fluconazole 200 mg daily. Enforced he needs to take both of these daily and he will start.     COPD: No change in chronic cough or SHORE. Monitor. Hx of neutropenic fx with PNA end of May.     Nausea.   -Mild and managed with prn antiemetics.     Constipation.  -Managed with dulcolax PRN.     DERM: Unclear etiology for plaques. Does not seem to be bruising alone but is not a clear drug rash either. Not bothersome so okay to monitor.     Samantha Garcia PA-C

## 2020-07-29 NOTE — LETTER
7/29/2020         RE: Stew Crooks  [address] [facility] [place] [address]      Oncology/Hematology Visit Note  Jul 29, 2020    Reason for Visit: f/u MDS    PROBLEM LIST:  1. 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  No FLT3 ITD, but not enough sample for D835 analysis  - History of pancytopenia, December 2015, with negative evaluation except for bone marrow biopsy possibly suspicious for MDS. Normal cytogenetics  - Initiated treatment with azacitidine 4/19/20, in attempts to decrease in his need for transfusion and to improve his quality of life. Baseline counts on 4/20: Hgb 7.2, platelets 134, ANC 1.3       2.  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 Received chemoradiation with cisplatin/Etoposide 4/14/14-5/19/14.  6000 cGy in 30 fractions completed 6/9/14 . Difficult course, with hospitalizations for neutropenic fevers esophagitis. No evidence of lung cancer recurrence.    Interval history: Mr. Crooks was seen in infusion with assistance of a [place]  on iPad. He is feeling well. He continues to tolerate treatment fairly well. He has nausea and constipation from treatment and has noticed violaceous lesions on the backs on his hands. These do not itch. Nausea is throughout the whole month after treatment and is controlled well with antiemetics. He does not have to use these daily. Uses dulcolax about once per week for constipation. No diarrhea or bladder concerns. Energy level is stable. He takes 1 nap per day. Breathing is okay. Chronic cough is unchanged. No fevers/chills. No bleeding or bruising, no mucositis, abdominal pain, or headaches. His questions today are is he responding to treatment and does he need to be on levaquin.     Current Outpatient Medications   Medication Sig Dispense Refill     ALBUTEROL 108  (90 BASE) MCG/ACT inhaler INHALE 2 PUFFS BY MOUTH EVERY 4 HOURS AS NEEDED  3     DULERA 100-5 MCG/ACT oral inhaler INHALE 2 PUFFS BY MOUTH TWICE DAILY  3     famotidine (PEPCID) 20 MG tablet        fluconazole (DIFLUCAN) 200 MG tablet Take 1 tablet (200 mg) by mouth daily 30 tablet 1     levofloxacin (LEVAQUIN) 250 MG tablet Take 1 tablet (250 mg) by mouth daily 30 tablet 1     loperamide (IMODIUM) 2 MG capsule Take 1 capsule (2 mg) by mouth 4 times daily as needed for diarrhea 30 capsule 0     LORazepam (ATIVAN) 0.5 MG tablet Take 1 tablet (0.5 mg) by mouth every 4 hours as needed (Anxiety, Nausea/Vomiting or Sleep) 30 tablet 5     ondansetron (ZOFRAN) 8 MG tablet Take 1 tablet (8 mg) by mouth every 8 hours as needed (Nausea/Vomiting) 10 tablet 5     oxymetazoline (AFRIN) 0.05 % nasal spray Spray 2 sprays into both nostrils 2 times daily as needed for congestion or other (epistaxis) 1 Bottle 0     prochlorperazine (COMPAZINE) 10 MG tablet Take 0.5 tablets (5 mg) by mouth every 6 hours as needed (Nausea/Vomiting) 30 tablet 5          Allergies   Allergen Reactions     Bactrim [Sulfamethoxazole W/Trimethoprim] Hives and Itching     Required stat IV benadryl     Blood Transfusion Related (Informational Only)      Developed hives and dizziness post blood transfusion on 4/8/20.       PHYSICAL EXAM:  /62 (BP Location: Left arm, Patient Position: Sitting, Cuff Size: Adult Regular)   Pulse 63   Temp 98.1  F (36.7  C) (Oral)   Resp 16   Wt 69.1 kg (152 lb 6.4 oz)   SpO2 97%   BMI 22.51 kg/m    General: Alert, oriented, pleasant, NAD  HEENT: Normocephalic, atraumatic, PERRL. Moist mucus membranes, no lesions, or thrush  Lungs: CTA bilaterally, normal work of breathing  Cardiac: RRR, S1, S2, no murmurs  Abdomen: Soft, nontender, nondistended. Normoactive bowel sounds.   Neuro: CNII-XII grossly intact  Extremities: No pedal edema  Skin: Violaceous plaques on extensor fingers and dorsum of hands and wrists.        Labs:    7/27/2020 09:53   Sodium 142   Potassium 4.4   Chloride 111 (H)   Carbon Dioxide 24   Urea Nitrogen 22   Creatinine 1.00   GFR Estimate 73   GFR Estimate If Black 85   Calcium 8.6   Anion Gap 6   Albumin 3.1 (L)   Protein Total 7.1   Bilirubin Total 0.9   Alkaline Phosphatase 71   ALT 18   AST 9   Glucose 150 (H)   WBC 1.7 (L)   Hemoglobin 7.9 (L)   Hematocrit 24.0 (L)   Platelet Count 49 (LL)   RBC Count 2.67 (L)   MCV 90   MCH 29.6   MCHC 32.9   RDW 13.6   Diff Method Manual Differential   % Neutrophils 40.3   % Lymphocytes 46.5   % Monocytes 2.6   % Eosinophils 0.0   % Basophils 1.8   % Metamyelocytes 3.5   % Blasts 5.3   Nucleated RBCs 1 (H)   Absolute Neutrophil 0.7 (L)   Absolute Lymphocytes 0.8   Absolute Monocytes 0.0   Absolute Eosinophils 0.0   Absolute Basophils 0.0   Absolute Metamyelocytes 0.1 (H)   Absolute Blasts 0.1 (H)   Absolute Nucleated RBC 0.0   Poikilocytosis Slight   Platelet Estimate Confirming automated cell count   ABO O   RH(D) Pos   Antibody Screen Neg   Test Valid Only At [facility]   Specimen Expires 07/30/2020   Blood Component Type Red Blood Cells Leukocyte Reduced   Unit Number [id number]   Division Number 00   Status of Unit Released to care unit   Crossmatch Red Blood Cells   Unit Status ISS         Impression/plan:   MDS with transfusion dependence  - 5.6% blasts, diagnosed 3/30/20.  IPSS-R 4.5  Intermediate- based on normal cytogenetics, Hgb <7, normal WBC and platelets. 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  No FLT3 ITD, but not enough sample for D835 analysis.  - Initiated palliative treatment with azacitidine 4/19/20  -Dr. Pearce delayed cycle 3 to allow for count recovery. Azacitidine was dose reduced 50% following this. Cycle 3 started 6/29/20. Plan for repeat bone marrow biopsy after cycle 4.      -Reviewed it is hard to tell if there has been any response from treatment based on his counts alone. The marrow will be the  definite check to see if MDS is responding or not.   -Remains transfusion dependent. Transfuse 1 U PRBC for hgb <8, platelet <10K. Received pRBCs yesterday. Continue to check counts week with PRN transfusions.     ID: Neutropenic at last check at ANC of 700, but afebrile.   -Continue levofloxacin 250 mg daily and fluconazole 200 mg daily. Enforced he needs to take both of these daily and he will start.     COPD: No change in chronic cough or SHORE. Monitor. Hx of neutropenic fx with PNA end of May.     Nausea.   -Mild and managed with prn antiemetics.     Constipation.  -Managed with dulcolax PRN.     DERM: Unclear etiology for plaques. Does not seem to be bruising alone but is not a clear drug rash either. Not bothersome so okay to monitor.     ANH Elizalde PA-C

## 2020-07-30 NOTE — PROGRESS NOTES
Infusion Nursing Note:  Stew Crooks presents today for cycle 4 day 4 Vidaza.    Patient seen by provider today: No   present during visit today: Yes: Ermelinda phone      Note: Pt feeling well today, no new concerns.    Intravenous Access:  Peripheral IV placed by vascular access after 2 unsuccessful attempts    Treatment Conditions:  Lab Results   Component Value Date    HGB 10.4 07/29/2020     Lab Results   Component Value Date    WBC 1.8 07/29/2020      Lab Results   Component Value Date    ANEU 1.0 07/29/2020     Lab Results   Component Value Date    PLT 59 07/29/2020      Lab Results   Component Value Date     07/27/2020                   Lab Results   Component Value Date    POTASSIUM 4.4 07/27/2020           Lab Results   Component Value Date    MAG 1.9 06/01/2020            Lab Results   Component Value Date    CR 1.00 07/27/2020                   Lab Results   Component Value Date    ALFREDO 8.6 07/27/2020                Lab Results   Component Value Date    BILITOTAL 0.9 07/27/2020           Lab Results   Component Value Date    ALBUMIN 3.1 07/27/2020                    Lab Results   Component Value Date    ALT 18 07/27/2020           Lab Results   Component Value Date    AST 9 07/27/2020           Post Infusion Assessment:  Patient tolerated infusion without incident.  Blood return noted pre and post infusion.  Site patent and intact, free from redness, edema or discomfort.  No evidence of extravasations.  PIV left in place for tomorrow's infusion.       Discharge Plan:   Patient declined prescription refills.  Discharge instructions reviewed with: Patient.  Patient and/or family verbalized understanding of discharge instructions and all questions answered.  AVS to patient via [employer].  Patient will return 7/31/20 for next appointment.   Patient discharged in stable condition accompanied by: self.  Departure Mode: Ambulatory.    Kena Rainey RN

## 2020-07-31 NOTE — PROGRESS NOTES
Infusion Nursing Note:  Stew Crooks presents today for C4D5 Vidaza.    Patient seen by provider today: No   present during visit today: Yes: Ermelinda phone .     Note: Patient feels well. Denies fever/chills. Denies nausea/vomiting nor chest and abdominal discomfort. No new concern made. Otherwise well.     Intravenous Access:  Peripheral IV placed kept from 7/30.    Treatment Conditions:  Not Applicable.      Post Infusion Assessment:  Patient tolerated infusion without incident.  Blood return noted pre and post infusion.  Site patent and intact, free from redness, edema or discomfort.  No evidence of extravasations.  Access discontinued per protocol.       Discharge Plan:   Patient declined prescription refills.  Discharge instructions reviewed with: Patient.  Patient and/or family verbalized understanding of discharge instructions and all questions answered.  Copy of AVS reviewed with patient and/or family.  Patient will return 8/7/20 for next appointment.  Patient discharged in stable condition accompanied by: self.  Departure Mode: Ambulatory.    MEENU HANNON RN

## 2020-07-31 NOTE — PATIENT INSTRUCTIONS
Contact Numbers  [facility]: [phone]    After Hours:  [phone]  Triage: [phone]    Please call the [facility] Triage line if you experience a temperature greater than or equal to 100.5, shaking chills, have uncontrolled nausea, vomiting and/or diarrhea, dizziness, shortness of breath, chest pain, bleeding, unexplained bruising, or if you have any other new/concerning symptoms, questions or concerns.     If it is after hours, weekends, or holidays, please call the main hospital  at  [phone] and ask to speak to the Oncology doctor on call.     If you are having any concerning symptoms or wish to speak to a provider before your next infusion visit, please call your care coordinator or triage to notify them so we can adequately serve you.     If you need a refill on a narcotic prescription or other medication, please call triage before your infusion appointment.         July 2020 Sunday Monday Tuesday Wednesday Thursday Friday Saturday                  1    [facility] ONC INFUSION 60   2:30 PM   (60 min.)   [facility] ONCOLOGY INFUSION   [facility] 2    [facility] DRAW   2:30 PM   (15 min.)    [facility]    [facility] ONC INFUSION 60   3:00 PM   (60 min.)    ONCOLOGY INFUSION   [facility] 3    [facility] ONC INFUSION 60   2:30 PM   (60 min.)    ONCOLOGY INFUSION   [facility] 4       5     6     7     8     9     10    [facility] DRAW   6:45 AM   (60 min.)    [facility]    [facility] ON[facility] INFUSION 360   7:30 AM   (360 min.)    ONCOLOGY INFUSION   [facility] 11       12     13     14     15    [facility] DRAW   6:45 AM   (15 min.)    [facility]   [facility]    [facility] INFUSION 360   7:00 AM   (360 min.)    ONCOLOGY INFUSION   [facility] 16     17     18       19     20     21     22    [facility]  DRAW   9:15 AM   (15 min.)    [facility] LAB DRAW   [facility] Draw    [facility] ONC INFUSION 360  10:00 AM   (360 min.)    ONCOLOGY INFUSION   [facility] 23     24     25       26     27    [facility] DRAW   9:15 AM   (15 min.)    [facility] LAB DRAW   [facility] Draw    [facility] ONC INFUSION 60  10:00 AM   (60 min.)    ONCOLOGY INFUSION   [facility]    TELEPHONE VISIT RETURN   2:50 PM   (50 min.)   Samantha Garcia PA-C   [facility] 28    [facility] ONC INFUSION 60   9:30 AM   (60 min.)    ONCOLOGY INFUSION   [facility] 29    [facility] DRAW   7:00 AM   (15 min.)    [facility]    [facility] INFUSION 360   7:30 AM   (360 min.)    ONCOLOGY INFUSION   [facility]    [facility] RETURN   7:35 AM   (50 min.)   Samantha Garcia PA-C   [facility] 30    [facility] ONC INFUSION 60   1:30 PM   (60 min.)    ONCOLOGY INFUSION   [facility] 31    [facility] ONC INFUSION 60   1:00 PM   (60 min.)    ONCOLOGY INFUSION   [facility]                  August 2020 Sunday Monday Tuesday Wednesday Thursday Friday Saturday                                 1       2     3     4     5     6     7    [facility] DRAW   7:00 AM   (15 min.)    [facility] LAB DRAW   [facility] Draw    [facility] ONC INFUSION 240   7:30 AM   (240 min.)    ONCOLOGY INFUSION   [facility] 8       9     10     11     12     13     14    [facility] DRAW   7:00 AM   (15 min.)    [facility] DRAW   [facility] Draw    [facility] ONC INFUSION 240   7:30 AM   (240 min.)    ONCOLOGY INFUSION   [facility] 15       16     17     18     19     20     21    [facility] DRAW   7:30 AM   (15 min.)    [facility] LAB DRAW   [facility] Draw    [facility] ONC INFUSION 240   8:00 AM   (240 min.)    ONCOLOGY  INFUSION   [facility] 22       23     24     25    [facility] DRAW  12:00 PM   (15 min.)   [facility] DRAW   [facility] Lab Draw    [facility] BONE MARROW BIOPSY  12:15 PM   (90 min.)   Roma Wilde PA-C   [facility] 26     27    TELEPHONE VISIT RETURN   8:00 AM   (30 min.)   Zoya Pearce MD   [facility] LAB DRAW   9:15 AM   (15 min.)   [facility] DRAW   [facility] Lab Draw    [facility] ONC INFUSION 240   9:30 AM   (240 min.)    ONCOLOGY INFUSION   [facility] 28    [facility] ONC INFUSION 60  10:00 AM   (60 min.)    ONCOLOGY INFUSION   [facility] 29       30     31    [facility] ONC INFUSION 60   9:30 AM   (60 min.)    ONCOLOGY INFUSION   [facility]                                          Lab Results:  No results found for this or any previous visit (from the past 12 hour(s)).

## 2020-08-05 NOTE — TELEPHONE ENCOUNTER
Called son Sunny since both pt and spouse were unable to be reached earlier with an . Reached , lm for son to call back, asked if he could reach either parent to conference call back to triage or ask parents to call back with an  before 5 pm.

## 2020-08-05 NOTE — TELEPHONE ENCOUNTER
Writer received call from Sunny, son of Stew, who stated that he received a call from his mom with concerns about Stew. Per Sunny, he is sleeping a lot, slept all day yesterday and is sleeping today as well, no energy. Writer routing encounter to triage RN's for assessment and coordination with providers of next steps.

## 2020-08-05 NOTE — TELEPHONE ENCOUNTER
Called Pt's SO to assess. No answer, no VM. Called Pt's cell, no answer, Left message on machine to call back.

## 2020-08-07 NOTE — PATIENT INSTRUCTIONS
Contact numbers:    Triage/[phone]    Call with chills and/or temperature greater than or equal to 100.5 and questions or concerns.    If after hours, weekends, or holidays, call main hospital  at  [phone] and ask for Oncology doctor on call.                                          1       2     3     4     5     6     7    [facility] DRAW   7:00 AM   (15 min.)    [facility] LAB DRAW   [facility] Draw    [facility] ONC INFUSION 240   7:30 AM   (240 min.)    ONCOLOGY INFUSION   [facility] 8       9     10     11     12     13     14    [facility] DRAW   7:00 AM   (15 min.)    [facility] LAB DRAW   [facility] Draw    [facility] ONC INFUSION 240   7:30 AM   (240 min.)    ONCOLOGY INFUSION   [facility] 15       16     17     18     19     20     21    [facility] DRAW   7:30 AM   (15 min.)    [facility]    [facility] ONC INFUSION 240   8:00 AM   (240 min.)    ONCOLOGY INFUSION   [facility] 22       23     24     25    [facility] DRAW  12:00 PM   (15 min.)    [facility] LAB DRAW   [facility] Lab Draw    [facility] BONE MARROW BIOPSY  12:15 PM   (90 min.)   Roma Wilde PA-C   [facility] 26     27    TELEPHONE VISIT RETURN   8:00 AM   (30 min.)   Zoya Pearce MD   [facility]    [facility] DRAW   9:15 AM   (15 min.)    [facility] LAB DRAW   [facility] Lab Draw    [facility] ONC INFUSION 240   9:30 AM   (240 min.)   [facility] ONCOLOGY INFUSION   [facility] 28    [facility] ONC INFUSION 60  10:00 AM   (60 min.)    ONCOLOGY INFUSION   [facility] 29       30     31    [facility] ONC INFUSION 60   9:30 AM   (60 min.)    ONCOLOGY INFUSION   [facility]                                      2020       Sunday Monday Tuesday Wednesday Thursday Friday Saturday             1    [facility] ONC INFUSION 60  10:00 AM   (60 min.)   [facility] ONCOLOGY INFUSION   [facility] 2    [facility] ONC INFUSION 60  10:00 AM   (60 min.)   [facility] ONCOLOGY INFUSION   [facility] 3     4     5       6     7     8     9     10     11     12       13     14     15     16     17     18     19       20     21     22     23     24     25     26       27     28     29     30                                    Lab Results:  Recent Results (from the past 12 hour(s))   Platelets prepare order unit    Collection Time: 08/07/20  7:00 AM   Result Value Ref Range    Blood Component Type PLT Pheresis     Units Ordered 1    Blood component    Collection Time: 08/07/20  7:00 AM   Result Value Ref Range    Unit Number [id number]     Blood Component Type PlateletPheresis,LeukoRed Irrad (Part 2)     Division Number 00     Status of Unit Ready for patient 08/07/2020 0734     Blood Product Code [id number]     Unit Status JAMSHID    CBC with platelets differential    Collection Time: 08/07/20  7:30 AM   Result Value Ref Range    WBC 1.7 (L) 4.0 - 11.0 10e9/L    RBC Count 3.12 (L) 4.4 - 5.9 10e12/L    Hemoglobin 9.3 (L) 13.3 - 17.7 g/dL    Hematocrit 27.5 (L) 40.0 - 53.0 %    MCV 88 78 - 100 fl    MCH 29.8 26.5 - 33.0 pg    MCHC 33.8 31.5 - 36.5 g/dL    RDW 14.0 10.0 - 15.0 %    Platelet Count 26 (LL) 150 - 450 10e9/L    Diff Method Manual Differential     % Neutrophils 47.4 %    % Lymphocytes 43.0 %    % Monocytes 2.6 %    % Eosinophils 0.0 %    % Basophils 1.7 %    % Metamyelocytes 0.9 %    % Myelocytes 3.5 %    % Blasts 0.9 %    Absolute Neutrophil 0.8 (L) 1.6 - 8.3 10e9/L    Absolute Lymphocytes 0.7 (L) 0.8 - 5.3 10e9/L    Absolute Monocytes 0.0 0.0 - 1.3 10e9/L    Absolute Eosinophils 0.0 0.0 - 0.7 10e9/L    Absolute Basophils 0.0 0.0 - 0.2 10e9/L    Absolute Metamyelocytes 0.0 0 10e9/L    Absolute Myelocytes 0.1 (H) 0 10e9/L    Absolute  Blasts 0.0 0 10e9/L    Poikilocytosis Slight     Ovalocytes Slight     Platelet Estimate Confirming automated cell count

## 2020-08-07 NOTE — PROGRESS NOTES
Infusion Nursing Note:  Stew Crooks presents for possible transfusions, not needed    Assessment done with [place]  via ipad.     Note: pt feeling well today, however he does endorse ongoing fatigue and weakness. Pt states it's the same as it has been, no worse than usual. Pts son called a couple days ago stating he was sleeping all day, pt states he does take naps, but he's not sleeping all day. Platelets 26 and hgb 9.3, orders to transfuse if platelets less than 20 and hgb less than 8. Pt denies any bleeding, next infusion appointment not until next Friday. Reviewed labs with pt and Dr. Pearce.    TORB- Dr. Pearce/Masha Hoyt RN  --okay to leave pts next appointment Friday, 8/14, he does not need to come in earlier with platelets of 26 today    Pain: denies    Treatment Conditions:  Lab Results   Component Value Date    HGB 9.3 08/07/2020     Lab Results   Component Value Date    WBC 1.7 08/07/2020      Lab Results   Component Value Date    ANEU 0.8 08/07/2020     Lab Results   Component Value Date    PLT 26 08/07/2020      Lab Results   Component Value Date     07/27/2020                   Lab Results   Component Value Date    POTASSIUM 4.4 07/27/2020           Lab Results   Component Value Date    MAG 1.9 06/01/2020            Lab Results   Component Value Date    CR 1.00 07/27/2020                   Lab Results   Component Value Date    ALFREDO 8.6 07/27/2020                Lab Results   Component Value Date    BILITOTAL 0.9 07/27/2020           Lab Results   Component Value Date    ALBUMIN 3.1 07/27/2020                    Lab Results   Component Value Date    ALT 18 07/27/2020           Lab Results   Component Value Date    AST 9 07/27/2020         Intravenous Access:  No Intravenous access at this visit.    Discharge Plan:   Patient declined prescription refills.  Discharge instructions reviewed with: Patient.  Patient and/or family verbalized understanding of discharge instructions and all  questions answered.  Copy of AVS reviewed with patient and/or family.  Patient will return 8/14 for next appointment.  Patient discharged in stable condition accompanied by: self.    Masha Hoyt, RN, RN

## 2020-08-14 NOTE — PROGRESS NOTES
Infusion Nursing Note:  Stew Crooks presents today for possible RBC and/or platelets.    Patient seen by provider today: No   present during visit today: Yes, Language: Arabic.     Note: Stew arrives to infusion today doing well. He states his fatigue is the same as last week. He has poor appetite and some intermittent nausea, but is eating and drinking adequately. He denies any fevers, chills, SOB, dizziness/lightheadedness, bleeding or any concerns today. ANC is 0.4. Patient verifies he is taking fluconazole and levaquin.     Hgb 7.6, will receive 2 units RBCs per treatment orders.      Intravenous Access:  Peripheral IV placed.    Treatment Conditions:  Lab Results   Component Value Date    HGB 7.6 08/14/2020     Lab Results   Component Value Date    WBC 1.0 08/14/2020      Lab Results   Component Value Date    ANEU 0.4 08/14/2020     Lab Results   Component Value Date    PLT 51 08/14/2020      Lab Results   Component Value Date     08/14/2020                   Lab Results   Component Value Date    POTASSIUM 4.1 08/14/2020           Lab Results   Component Value Date    MAG 1.9 06/01/2020            Lab Results   Component Value Date    CR 0.96 08/14/2020                   Lab Results   Component Value Date    ALFREDO 8.9 08/14/2020                Lab Results   Component Value Date    BILITOTAL 0.7 08/14/2020           Lab Results   Component Value Date    ALBUMIN 2.8 08/14/2020                    Lab Results   Component Value Date    ALT 17 08/14/2020           Lab Results   Component Value Date    AST 6 08/14/2020       Results reviewed, labs MET treatment parameters, ok to proceed with treatment.  Blood transfusion consent signed 3/30/20.    Post Infusion Assessment:  Patient tolerated infusion without incident.  Blood return noted pre and post infusion.  Site patent and intact, free from redness, edema or discomfort.  No evidence of extravasations.  Access discontinued per protocol.      Discharge Plan:   Patient declined prescription refills.  Copy of AVS reviewed with patient.  Patient will return 8/21 for next appointment.  Patient discharged in stable condition accompanied by: self.  Departure Mode: Ambulatory.    Chetna Reese RN

## 2020-08-14 NOTE — PATIENT INSTRUCTIONS
Contact Numbers  [facility]: [phone] (for symptom and scheduling needs)    Please call the [facility] Triage line if you experience a temperature greater than or equal to 100.4, shaking chills, have uncontrolled nausea, vomiting and/or diarrhea, dizziness, shortness of breath, chest pain, bleeding, unexplained bruising, or if you have any other new/concerning symptoms, questions or concerns.     If you are having any concerning symptoms or wish to speak to a provider before your next infusion visit, please call your care coordinator or triage to notify them so we can adequately serve you.     If you need a refill on a narcotic prescription or other medication, please call triage before your infusion appointment.        Lab Results:  Recent Results (from the past 12 hour(s))   Blood component    Collection Time: 08/14/20  7:00 AM   Result Value Ref Range    Unit Number [id number]     Blood Component Type PlateletPheresis LeukoReduced Irradiated     Division Number 00     Status of Unit No longer available 08/14/2020 1319     Blood Product Code [id number]     Unit Status RET    ABO/Rh type and screen    Collection Time: 08/14/20  7:25 AM   Result Value Ref Range    Units Ordered 2     ABO O     RH(D) Pos     Antibody Screen Neg     Test Valid Only At          [facility]    Specimen Expires 08/17/2020     Crossmatch Red Blood Cells    Blood component    Collection Time: 08/14/20  7:25 AM   Result Value Ref Range    Unit Number [id number]     Blood Component Type Red Blood Cells LeukoReduced (Part 2)     Division Number 00     Status of Unit No longer available 08/14/2020 1008     Blood Product Code [id number]     Unit Status RET    Blood component    Collection Time: 08/14/20  7:25 AM   Result Value Ref Range    Unit Number [id number]     Blood Component Type Red Blood Cells Leukocyte Reduced     Division Number 00     Status of Unit No longer available  08/14/2020 1009     Blood Product Code [id number]     Unit Status RET    Blood component    Collection Time: 08/14/20  7:25 AM   Result Value Ref Range    Unit Number [id number]     Blood Component Type Red Blood Cells Leukocyte Reduced     Division Number 00     Status of Unit Released to care unit 08/14/2020 1156     Blood Product Code [id number]     Unit Status ISS    Blood component    Collection Time: 08/14/20  7:25 AM   Result Value Ref Range    Unit Number [id number]     Blood Component Type Red Blood Cells Leukocyte Reduced     Division Number 00     Status of Unit Released to care unit 08/14/2020 1026     Blood Product Code I9015T91     Unit Status ISS    Uric acid    Collection Time: 08/14/20  7:26 AM   Result Value Ref Range    Uric Acid 5.0 3.5 - 7.2 mg/dL   Iron and iron binding capacity    Collection Time: 08/14/20  7:26 AM   Result Value Ref Range    Iron 103 35 - 180 ug/dL    Iron Binding Cap 182 (L) 240 - 430 ug/dL    Iron Saturation Index 57 (H) 15 - 46 %   Ferritin    Collection Time: 08/14/20  7:26 AM   Result Value Ref Range    Ferritin 1,912 (H) 26 - 388 ng/mL   Lactate Dehydrogenase    Collection Time: 08/14/20  7:26 AM   Result Value Ref Range    Lactate Dehydrogenase 180 85 - 227 U/L   CRP inflammation    Collection Time: 08/14/20  7:26 AM   Result Value Ref Range    CRP Inflammation 97.8 (H) 0.0 - 8.0 mg/L   Reticulocyte count    Collection Time: 08/14/20  7:26 AM   Result Value Ref Range    % Retic 0.3 (L) 0.5 - 2.0 %    Absolute Retic 8.7 (L) 25 - 95 10e9/L   Comprehensive metabolic panel    Collection Time: 08/14/20  7:26 AM   Result Value Ref Range    Sodium 136 133 - 144 mmol/L    Potassium 4.1 3.4 - 5.3 mmol/L    Chloride 106 94 - 109 mmol/L    Carbon Dioxide 23 20 - 32 mmol/L    Anion Gap 6 3 - 14 mmol/L    Glucose 158 (H) 70 - 99 mg/dL    Urea Nitrogen 22 7 - 30 mg/dL    Creatinine 0.96 0.66 - 1.25 mg/dL    GFR Estimate 77 >60 mL/min/[1.73_m2]    GFR Estimate If Black 89 >60  mL/min/[1.73_m2]    Calcium 8.9 8.5 - 10.1 mg/dL    Bilirubin Total 0.7 0.2 - 1.3 mg/dL    Albumin 2.8 (L) 3.4 - 5.0 g/dL    Protein Total 6.9 6.8 - 8.8 g/dL    Alkaline Phosphatase 49 40 - 150 U/L    ALT 17 0 - 70 U/L    AST 6 0 - 45 U/L   CBC with platelets differential    Collection Time: 08/14/20  7:26 AM   Result Value Ref Range    WBC 1.0 (L) 4.0 - 11.0 10e9/L    RBC Count 2.55 (L) 4.4 - 5.9 10e12/L    Hemoglobin 7.6 (L) 13.3 - 17.7 g/dL    Hematocrit 22.6 (L) 40.0 - 53.0 %    MCV 89 78 - 100 fl    MCH 29.8 26.5 - 33.0 pg    MCHC 33.6 31.5 - 36.5 g/dL    RDW 13.7 10.0 - 15.0 %    Platelet Count 51 (L) 150 - 450 10e9/L    Diff Method Manual Differential     % Neutrophils 41.4 %    % Lymphocytes 52.6 %    % Monocytes 0.0 %    % Eosinophils 0.0 %    % Basophils 0.0 %    % Metamyelocytes 1.7 %    % Myelocytes 0.9 %    % Blasts 3.4 %    Absolute Neutrophil 0.4 (LL) 1.6 - 8.3 10e9/L    Absolute Lymphocytes 0.5 (L) 0.8 - 5.3 10e9/L    Absolute Monocytes 0.0 0.0 - 1.3 10e9/L    Absolute Eosinophils 0.0 0.0 - 0.7 10e9/L    Absolute Basophils 0.0 0.0 - 0.2 10e9/L    Absolute Metamyelocytes 0.0 0 10e9/L    Absolute Myelocytes 0.0 0 10e9/L    Absolute Blasts 0.0 0 10e9/L    Poikilocytosis Slight     Teardrop Cells Slight     Ovalocytes Slight     Platelet Estimate Confirming automated cell count

## 2020-08-14 NOTE — NURSING NOTE
Chief Complaint   Patient presents with     Blood Draw     Labs drawn via PIV placed by RN in lab. VS taken.      Brandee Barajas RN

## 2020-08-20 NOTE — TELEPHONE ENCOUNTER
Social Work Note: Telephone Call  Oncology Clinic    Data/Intervention:  Patient Name:  Stew Crooks  /Age:  1945 (75 year old)    Call From:  HECTOR contacted pt's son, Sunny  Reason for Call:  Transportation questions    Assessment:  SW spoke with son Sunny, over the phone. Sunny generally provides rides for appointments, is unable for  appt. SW suggested he check with [employer] transportation to see if pt has ride benefits and provided phone number and pt's member ID number to set up rides.  Patient is outside of range for [employer].  Explained family may need to pay for cab ride or other private option if no insurance benefit.    Plan:  HECTOR gave contact information for any other questions. SW available to assist with any other needs.     Soo Yeon Han, MSW, LICSW  Pager: [phone]  Phone: [phone]

## 2020-08-21 NOTE — PROGRESS NOTES
Infusion Nursing Note:  Stew Crooks presents today for possible transfusion.    Patient seen by provider today: No  : Yes, Ermelinda    Intravenous Access:  Peripheral IV placed in lab.    Treatment Conditions:  Lab Results   Component Value Date    HGB 9.4 08/21/2020     Lab Results   Component Value Date    WBC 1.5 08/21/2020      Lab Results   Component Value Date    ANEU 0.7 08/21/2020     Lab Results   Component Value Date    PLT 82 08/21/2020      Lab Results   Component Value Date     08/14/2020                   Lab Results   Component Value Date    POTASSIUM 4.1 08/14/2020           Lab Results   Component Value Date    MAG 1.9 06/01/2020            Lab Results   Component Value Date    CR 0.96 08/14/2020                   Lab Results   Component Value Date    ALFREDO 8.9 08/14/2020                Lab Results   Component Value Date    BILITOTAL 0.7 08/14/2020           Lab Results   Component Value Date    ALBUMIN 2.8 08/14/2020                    Lab Results   Component Value Date    ALT 17 08/14/2020           Lab Results   Component Value Date    AST 6 08/14/2020       Results reviewed, labs did NOT meet treatment parameters: Hgb> 8.1, Platelet >20K.    Note: Patient presents to infusion feeling well. He reports his dry cough is better than 2 ago when he spoke with triage RN. Reports continued intermittent nausea and constipation. Denied fever, chills, SOB, dizziness/lightheadedness, new bleeding or bruising. Patient states he is taking all prescribed medications. Requesting cough syrup. Message sent to JEY Elizalde. Rx sent to patient's home pharmacy.    Post Infusion Assessment:  Access discontinued per protocol.    Discharge Plan:   Prescription refills given for Guaifenesin.  Discharge instructions reviewed with: Patient and .  Patient and/or family verbalized understanding of discharge instructions and all questions answered.  Copy of AVS reviewed with patient and/or  family.  Patient will return 8/25/20 for next appointment.  Patient discharged in stable condition accompanied by: self.  Departure Mode: Ambulatory.    Lela Lino RN

## 2020-08-21 NOTE — NURSING NOTE
Chief Complaint   Patient presents with     Blood Draw     Labs drawn via /PIV placed by RN in lab. VS taken.     Zuly Sheppard RN    
normal...

## 2020-08-21 NOTE — PATIENT INSTRUCTIONS
Contact Numbers  [facility] Nurse Triage: [phone]    Please call the [facility] Triage line if you experience a temperature greater than or equal to 100.5, shaking chills, have uncontrolled nausea, vomiting and/or diarrhea, dizziness, shortness of breath, chest pain, bleeding, unexplained bruising, or if you have any other new/concerning symptoms, questions or concerns.     If you are having any concerning symptoms or wish to speak to a provider before your next infusion visit, please call your care coordinator or triage to notify them so we can adequately serve you.     If you need a refill on a prescription or other medication, please call triage before your infusion appointment.       August 2020 Sunday Monday Tuesday Wednesday Thursday Friday Saturday                                 1       2     3     4     5     6     7    [facility]   7:00 AM   (15 min.)    [facility]   [facility] Lab Draw    P ONC INFUSION 240   7:30 AM   (240 min.)    ONCOLOGY INFUSION   [facility] 8       9     10     11     12     13     14    [facility]   7:00 AM   (15 min.)    [facility] DRAW   [facility] Lab Draw    [facility] ONC INFUSION 240   7:30 AM   (240 min.)    ONCOLOGY INFUSION   [facility] 15       16     17     18     19     20     21    [facility] D[facility]   7:30 AM   (15 min.)    [facility] DRAW   [facility] Lab Draw    [facility] ONC INFUSION 240   8:00 AM   (240 min.)    ONCOLOGY INFUSION   [facility] 22       23     24     25    [facility] DRAW  12:00 PM   (15 min.)    [facility] DRAW   [facility] Lab Draw    P BONE MARROW BIOPSY  12:15 PM   (90 min.)   Roma Wilde PA-C   [facility] 26     27    TELEPHONE VISIT RETURN   8:00 AM   (30 min.)   Zoya Pearce MD   [facility]    [facility] DRAW   9:15 AM   (15 min.)     [facility] LAB DRAW   [facility] Lab Draw    [facility] ONC INFUSION 240   9:30 AM   (240 min.)    ONCOLOGY INFUSION   [facility] 28    [facility] ONC INFUSION 60  10:00 AM   (60 min.)    ONCOLOGY INFUSION   [facility] 29       30     31    [facility] INFUSION 60   9:30 AM   (60 min.)   [facility]FUSION   [facility]                                      September 2020 Sunday Monday Tuesday Wednesday Thursday Friday Saturday             1    [facility] ONC INFUSION 60  10:00 AM   (60 min.)   [facility] ONCOL[facility] INFUSION   [facility] 2    [facility] ON[facility] INFUSION 60  10:00 AM   (60 min.)   [facility] ONCOLOGY INFUSION   [facility] 3     4     5       6     7     8     9     10     11     12       13     14     15     16     17     18     19       20     21     22     23     24     25     26       27     28     29     30                                    Lab Results:  Recent Results (from the past 12 hour(s))   CBC with platelets differential    Collection Time: 08/21/20  7:48 AM   Result Value Ref Range    WBC 1.5 (L) 4.0 - 11.0 10e9/L    RBC Count 3.10 (L) 4.4 - 5.9 10e12/L    Hemoglobin 9.4 (L) 13.3 - 17.7 g/dL    Hematocrit 28.5 (L) 40.0 - 53.0 %    MCV 92 78 - 100 fl    MCH 30.3 26.5 - 33.0 pg    MCHC 33.0 31.5 - 36.5 g/dL    RDW 13.2 10.0 - 15.0 %    Platelet Count 82 (L) 150 - 450 10e9/L    Diff Method PENDING    Blood component    Collection Time: 08/21/20 12:00 PM   Result Value Ref Range    Unit Number [id number]     Blood Component Type PlateletPheresis,LeukoRed Irrad (Part 2)     Division Number 00     Status of Unit Ready for patient 08/21/2020 0749     Blood Product Code [id number]     Unit Status JAMSHID

## 2020-08-24 ENCOUNTER — PATIENT OUTREACH (OUTPATIENT)
Dept: ONCOLOGY | Facility: CLINIC | Age: 75
End: 2020-08-24

## 2020-08-24 NOTE — PROGRESS NOTES
Writer placed call to Sunny, son, in response to [employer] message sent regarding the passing of his father, Stew. No answer received, writer left VM offering condolences and to help with anything if needed.

## 2020-09-02 ENCOUNTER — PATIENT OUTREACH (OUTPATIENT)
Dept: ONCOLOGY | Facility: CLINIC | Age: 75
End: 2020-09-02

## 2020-09-02 NOTE — PROGRESS NOTES
NOTIFICATION OF A  PATIENT  EMAIL THIS COMPLETED INFORMATION TO THE APPROPRIATE ENTITY:    [facility]: [email or web address]   [facility]:  [email or web address]   [email or web address]: [email or web address]   [facility]:  [email or web address]     PATIENT INFORMATION   Last name: Valeriy First name: Stew   Medical Record Number: [id number] County of Death: [place]   YOB: 1945 Date of Death: 2020   PARENT (FOR PATIENTS UNDER 18) OR LEGAL GUARDIAN (IF APPLICABLE):   Relationship to  patient:   Last name: First name:   Date of Birth: Telephone Number:   Address:     City: State: Zip Code:   SURVIVING SPOUSE INFORMATION (IF THERE IS A SURVIVING SPOUSE)   Last name:  LYNETTE NOVAK First name:   Date of Birth: Telephone number:   Address:     City: State: Zip Code:   PERSON THAT INFORMED US OF PATIENT'S DEATH   Last name:[facility] verified [place] [employer]    First name:   Relationship to  patient: Telephone number:

## 2020-09-04 ENCOUNTER — TELEPHONE (OUTPATIENT)
Dept: ONCOLOGY | Facility: CLINIC | Age: 75
End: 2020-09-04

## 2020-09-04 ENCOUNTER — DOCUMENTATION ONLY (OUTPATIENT)
Dept: ONCOLOGY | Facility: CLINIC | Age: 75
End: 2020-09-04

## 2020-09-04 LAB — TRANSF REACT PLASRBC-IMP: NORMAL

## 2020-09-04 NOTE — PROGRESS NOTES
Message received from RN[facility]:    Hello,     [facility] Denisse at [phone].     Thanks,   Argy     Letter printed and faxed. Confirmation verified via rightfax.      Sammie Sherman, CMA

## 2020-09-04 NOTE — TELEPHONE ENCOUNTER
Letter of non-contagious disease need to be emailed to [email or web address]  Fax [phone]. Requesting as asap stated they have called for 2 days now.

## 2025-02-28 NOTE — PROGRESS NOTES
Patient discharged by Hematology / Oncology    Per protocol, hem/oncology clinic to call patient with discharge follow up.  This chart will now be closed. Sarah Eng CMA Post Discharge Team      Kernig's sign - negative  Brudjoelnski's sign - negative